# Patient Record
Sex: MALE | Race: WHITE | NOT HISPANIC OR LATINO | Employment: FULL TIME | URBAN - METROPOLITAN AREA
[De-identification: names, ages, dates, MRNs, and addresses within clinical notes are randomized per-mention and may not be internally consistent; named-entity substitution may affect disease eponyms.]

---

## 2017-01-11 ENCOUNTER — OFFICE VISIT (OUTPATIENT)
Dept: INTERNAL MEDICINE | Facility: CLINIC | Age: 22
End: 2017-01-11
Payer: COMMERCIAL

## 2017-01-11 ENCOUNTER — IMMUNIZATION (OUTPATIENT)
Dept: INTERNAL MEDICINE | Facility: CLINIC | Age: 22
End: 2017-01-11
Payer: COMMERCIAL

## 2017-01-11 ENCOUNTER — LAB VISIT (OUTPATIENT)
Dept: LAB | Facility: HOSPITAL | Age: 22
End: 2017-01-11
Payer: COMMERCIAL

## 2017-01-11 VITALS
HEART RATE: 102 BPM | DIASTOLIC BLOOD PRESSURE: 60 MMHG | WEIGHT: 276.69 LBS | HEIGHT: 73 IN | BODY MASS INDEX: 36.67 KG/M2 | SYSTOLIC BLOOD PRESSURE: 142 MMHG

## 2017-01-11 DIAGNOSIS — R63.1 POLYDIPSIA: ICD-10-CM

## 2017-01-11 DIAGNOSIS — Z00.00 HEALTHCARE MAINTENANCE: ICD-10-CM

## 2017-01-11 DIAGNOSIS — R03.0 ELEVATED BLOOD PRESSURE READING IN OFFICE WITHOUT DIAGNOSIS OF HYPERTENSION: ICD-10-CM

## 2017-01-11 DIAGNOSIS — I10 ESSENTIAL HYPERTENSION: ICD-10-CM

## 2017-01-11 DIAGNOSIS — E66.9 OBESITY, CLASS II, BMI 35-39.9: ICD-10-CM

## 2017-01-11 DIAGNOSIS — E11.9 DIABETES MELLITUS, NEW ONSET: Primary | ICD-10-CM

## 2017-01-11 LAB
ALBUMIN SERPL BCP-MCNC: 3.9 G/DL
ALP SERPL-CCNC: 82 U/L
ALT SERPL W/O P-5'-P-CCNC: 96 U/L
ANION GAP SERPL CALC-SCNC: 14 MMOL/L
AST SERPL-CCNC: 57 U/L
BILIRUB SERPL-MCNC: 0.5 MG/DL
BUN SERPL-MCNC: 10 MG/DL
CALCIUM SERPL-MCNC: 9.7 MG/DL
CHLORIDE SERPL-SCNC: 103 MMOL/L
CHOLEST/HDLC SERPL: 8.6 {RATIO}
CO2 SERPL-SCNC: 20 MMOL/L
CREAT SERPL-MCNC: 0.9 MG/DL
EST. GFR  (AFRICAN AMERICAN): >60 ML/MIN/1.73 M^2
EST. GFR  (NON AFRICAN AMERICAN): >60 ML/MIN/1.73 M^2
GLUCOSE SERPL-MCNC: 367 MG/DL
HDL/CHOLESTEROL RATIO: 11.7 %
HDLC SERPL-MCNC: 240 MG/DL
HDLC SERPL-MCNC: 28 MG/DL
LDLC SERPL CALC-MCNC: ABNORMAL MG/DL
NONHDLC SERPL-MCNC: 212 MG/DL
POTASSIUM SERPL-SCNC: 4 MMOL/L
PROT SERPL-MCNC: 7.3 G/DL
SODIUM SERPL-SCNC: 137 MMOL/L
TRIGL SERPL-MCNC: 419 MG/DL
TSH SERPL DL<=0.005 MIU/L-ACNC: 2.24 UIU/ML

## 2017-01-11 PROCEDURE — 82088 ASSAY OF ALDOSTERONE: CPT

## 2017-01-11 PROCEDURE — 36415 COLL VENOUS BLD VENIPUNCTURE: CPT

## 2017-01-11 PROCEDURE — 3046F HEMOGLOBIN A1C LEVEL >9.0%: CPT | Mod: S$GLB,,, | Performed by: STUDENT IN AN ORGANIZED HEALTH CARE EDUCATION/TRAINING PROGRAM

## 2017-01-11 PROCEDURE — 2022F DILAT RTA XM EVC RTNOPTHY: CPT | Mod: S$GLB,,, | Performed by: STUDENT IN AN ORGANIZED HEALTH CARE EDUCATION/TRAINING PROGRAM

## 2017-01-11 PROCEDURE — 90471 IMMUNIZATION ADMIN: CPT | Mod: S$GLB,,, | Performed by: INTERNAL MEDICINE

## 2017-01-11 PROCEDURE — 3060F POS MICROALBUMINURIA REV: CPT | Mod: S$GLB,,, | Performed by: STUDENT IN AN ORGANIZED HEALTH CARE EDUCATION/TRAINING PROGRAM

## 2017-01-11 PROCEDURE — 83036 HEMOGLOBIN GLYCOSYLATED A1C: CPT

## 2017-01-11 PROCEDURE — 84443 ASSAY THYROID STIM HORMONE: CPT

## 2017-01-11 PROCEDURE — 99999 PR PBB SHADOW E&M-EST. PATIENT-LVL III: CPT | Mod: PBBFAC,,, | Performed by: STUDENT IN AN ORGANIZED HEALTH CARE EDUCATION/TRAINING PROGRAM

## 2017-01-11 PROCEDURE — 1159F MED LIST DOCD IN RCRD: CPT | Mod: S$GLB,,, | Performed by: STUDENT IN AN ORGANIZED HEALTH CARE EDUCATION/TRAINING PROGRAM

## 2017-01-11 PROCEDURE — 80061 LIPID PANEL: CPT

## 2017-01-11 PROCEDURE — 80053 COMPREHEN METABOLIC PANEL: CPT

## 2017-01-11 PROCEDURE — 3078F DIAST BP <80 MM HG: CPT | Mod: S$GLB,,, | Performed by: STUDENT IN AN ORGANIZED HEALTH CARE EDUCATION/TRAINING PROGRAM

## 2017-01-11 PROCEDURE — 99204 OFFICE O/P NEW MOD 45 MIN: CPT | Mod: S$GLB,,, | Performed by: STUDENT IN AN ORGANIZED HEALTH CARE EDUCATION/TRAINING PROGRAM

## 2017-01-11 PROCEDURE — 90688 IIV4 VACCINE SPLT 0.5 ML IM: CPT | Mod: S$GLB,,, | Performed by: INTERNAL MEDICINE

## 2017-01-11 PROCEDURE — 3074F SYST BP LT 130 MM HG: CPT | Mod: S$GLB,,, | Performed by: STUDENT IN AN ORGANIZED HEALTH CARE EDUCATION/TRAINING PROGRAM

## 2017-01-11 RX ORDER — CLOSTRIDIUM TETANI TOXOID ANTIGEN (FORMALDEHYDE INACTIVATED), CORYNEBACTERIUM DIPHTHERIAE TOXOID ANTIGEN (FORMALDEHYDE INACTIVATED), BORDETELLA PERTUSSIS TOXOID ANTIGEN (GLUTARALDEHYDE INACTIVATED), BORDETELLA PERTUSSIS FILAMENTOUS HEMAGGLUTININ ANTIGEN (FORMALDEHYDE INACTIVATED), BORDETELLA PERTUSSIS PERTACTIN ANTIGEN, AND BORDETELLA PERTUSSIS FIMBRIAE 2/3 ANTIGEN 5; 2; 2.5; 5; 3; 5 [LF]/.5ML; [LF]/.5ML; UG/.5ML; UG/.5ML; UG/.5ML; UG/.5ML
INJECTION, SUSPENSION INTRAMUSCULAR
Refills: 0 | COMMUNITY
Start: 2016-11-19 | End: 2017-09-01

## 2017-01-11 NOTE — PROGRESS NOTES
Subjective:       Patient ID: Roberto Carlos Souza is a 21 y.o. male.    Chief Complaint: Annual Exam    HPI Comments: Roberto Carlos Souza is a 21 year old male with a medical history significant for generalized anxiety disorder, who presents to the clinic this afternoon to establish care and to manage hypertension that has been found at his psychiatrist's office. According to chart review, patient's last BP measured there on 10/11/2016 was 172/106. Patient otherwise denies symptoms; he states that he does not have headaches, vision changes, chest pain, or shortness of breath. He does endorse being very thirsty the last 2-3 weeks and nocturia (voiding at least twice per night last 2 weeks). 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 He has been told he snores loudly in his sleep but he denies waking up short of breath at night or headaches in the mornings or through the night.     Review of Systems   Constitutional: Positive for fatigue. Negative for activity change, chills and diaphoresis.   HENT: Negative for congestion and sore throat.    Eyes: Negative for pain and visual disturbance.   Respiratory: Negative for chest tightness, shortness of breath and wheezing.    Cardiovascular: Negative for chest pain, palpitations and leg swelling.   Gastrointestinal: Negative for abdominal distention, constipation, diarrhea, nausea and vomiting.   Endocrine: Positive for polydipsia and polyuria. Negative for cold intolerance and heat intolerance.   Genitourinary: Positive for frequency. Negative for dysuria, hematuria and urgency.   Musculoskeletal: Positive for back pain. Negative for arthralgias.   Neurological: Negative for dizziness, light-headedness and numbness.   Psychiatric/Behavioral: Positive for sleep disturbance. Negative for confusion and suicidal ideas. The patient is not nervous/anxious.        Objective:       Visit Vitals    BP (!) 142/60 (BP Location:  "Left arm, Patient Position: Sitting, BP Method: Manual)    Pulse 102    Ht 6' 1" (1.854 m)    Wt 125.5 kg (276 lb 10.8 oz)    BMI 36.5 kg/m2     Physical Exam   Constitutional: He is oriented to person, place, and time. He appears well-developed and well-nourished. No distress.   HENT:   Head: Normocephalic and atraumatic.   Mouth/Throat: Oropharynx is clear and moist. No oropharyngeal exudate.   Eyes: EOM are normal. Pupils are equal, round, and reactive to light.   Neck: Normal range of motion. Neck supple. No JVD present.   Cardiovascular: Normal rate, regular rhythm, normal heart sounds and intact distal pulses.    No murmur heard.  Pulmonary/Chest: Effort normal and breath sounds normal. He has no wheezes.   Abdominal: Soft. Bowel sounds are normal. He exhibits no distension. There is no tenderness.   Lymphadenopathy:     He has no cervical adenopathy.   Neurological: He is alert and oriented to person, place, and time.   Skin: Skin is warm and dry. No rash noted. He is not diaphoretic. No erythema.       Assessment:       1. Healthcare maintenance    2. Polydipsia    3. Essential hypertension        Plan:       1. Hypertension  - Last check at psychiatrist's office was 172/106. First check with electric cuff here was 164/98.  - However, with manual cuff with appropriate size (patient with large arms), RA was 138/68 and LA was 142/60 - still elevated but much improved.  - Likely 2/2 to incorrect cuff size and use of machine rather than manual readings.  - Ordered TSH and aldosterone to investigated secondary causes; if unresolved will consider sleep study for GIO investigation (suggestive habitus, with BMI of 36.5) and PATRICK US if other results inconclusive.     2. Polydipsia  - Significant family history of both parents having type II DM; recent symptoms of polydipsia and nocturia.  - With daytime fatigue which could be work-related, 2/2 to undiagnosed GIO, or undiagnosed type II DM.  - HgA1c ordered " today.    3. Health maintenance  - LIPID PANEL and Comprehensive metabolic panel ordered today.  - Received Tdap on 11/29 and received influenza vaccination this afternoon.  - Not interested in STI testing.    Disposition: F/U 1 year; will call back patient with results of labs.    Pavel Bush MD  PGY-1 Internal Medicine  [phone]

## 2017-01-11 NOTE — MR AVS SNAPSHOT
[facility]  [address]  [place] [address]  Phone: [phone]  Fax: [phone]                  Roberto Carlos Souza   2017 2:15 PM   Office Visit    Description:  Male : 1995   Provider:  Pavel Bush MD   Department:  [facility]           Reason for Visit     Annual Exam           Diagnoses this Visit        Comments    Healthcare maintenance    -  Primary     Polydipsia         Essential hypertension                To Do List           Goals (5 Years of Data)     None      [facility]     [facility] Care [facility] -  Assistance  Registered nurses in the [facility] Call Center provide clinical advisement, health education, appointment booking, and other advisory services.  Call for this free service at [phone].             Medications           Message regarding Medications     Verify the changes and/or additions to your medication regime listed below are the same as discussed with your clinician today.  If any of these changes or additions are incorrect, please notify your healthcare provider.             Verify that the below list of medications is an accurate representation of the medications you are currently taking.  If none reported, the list may be blank. If incorrect, please contact your healthcare provider. Carry this list with you in case of emergency.           Current Medications     ADACEL,TDAP ADOLESN/ADULT,,PF, 2 Lf-(2.5-5-3-5 mcg)-5Lf/0.5 mL Syrg TO BE ADMINISTERED BY PHARMACIST FOR IMMUNIZATION    buPROPion (WELLBUTRIN XL) 150 MG TB24 tablet Take 1 tablet (150 mg total) by mouth once daily.    FEXOFENADINE HCL (ALLEGRA ORAL) Take by mouth.    fluoxetine (PROZAC) 10 MG capsule Take 1 capsule (10 mg total) by mouth once daily.           Clinical Reference Information           Vital Signs - Last Recorded  Most recent update: 2017  3:25 PM by Pavel Bush MD    BP Pulse Ht Wt BMI    (!) 142/60 (BP  "Location: Left arm, Patient Position: Sitting, BP Method: Manual) 102 6' 1" (1.854 m) 125.5 kg (276 lb 10.8 oz) 36.5 kg/m2      Blood Pressure          Most Recent Value    BP  (!)  142/60      Allergies as of 1/11/2017     No Known Allergies      Immunizations Administered on Date of Encounter - 1/11/2017     None      Orders Placed During Today's Visit     Future Labs/Procedures Expected by Expires    Aldosterone  1/11/2017 3/12/2018    Comprehensive metabolic panel  1/11/2017 3/12/2018    HEMOGLOBIN A1C  1/11/2017 3/12/2018    LIPID PANEL  1/11/2017 3/12/2018    TSH  1/11/2017 3/12/2018      Smoking Cessation     If you would like to quit smoking:   You may be eligible for free services if you are a [place] resident and started smoking cigarettes before September 1, 1988.  Call the Smoking Cessation [employer] (SCT) toll free at (560) 850-5407 or (485) 773-9926.   Call 9-800-QUIT-NOW if you do not meet the above criteria.            "

## 2017-01-12 LAB
ESTIMATED AVG GLUCOSE: 237 MG/DL
HBA1C MFR BLD HPLC: 9.9 %

## 2017-01-13 DIAGNOSIS — Z00.00 HEALTHCARE MAINTENANCE: Primary | ICD-10-CM

## 2017-01-13 DIAGNOSIS — E11.9 TYPE 2 DIABETES MELLITUS WITHOUT COMPLICATION, WITHOUT LONG-TERM CURRENT USE OF INSULIN: Primary | ICD-10-CM

## 2017-01-13 RX ORDER — METFORMIN HYDROCHLORIDE 500 MG/1
TABLET ORAL
Qty: 180 TABLET | Refills: 3 | Status: SHIPPED | OUTPATIENT
Start: 2017-01-13 | End: 2017-05-23

## 2017-01-13 NOTE — TELEPHONE ENCOUNTER
I was able to reach Mr. Souza on the phone to discuss the findings of the labs, particularly a HgA1c of 9.9. As he is a very young man, I have deferred starting insulin at this moment (had discussion with staff physician Dr. Chavez). I had a long discussion with him about diet modification and starting metformin, prior to using insulin. I have sent metformin to his pharmacy with instruction to take 500 mg PO BID then increase to 1000 mg PO BID after one week. We discussed the adverse effects, largely GI upset and diarrhea, that could happen with the medication. Otherwise, I have also reordered another lipid panel, fasting, as the first was done without fasting.

## 2017-01-13 NOTE — PROGRESS NOTES
Preceptor note    Patient's history and physical discussed, please refer to resident physician's note for specific details. Pt seen and examined with resident physician. Medical record reviewed.  I agree with resident's assessment and plan.    In addition, labs confirm new onset diabetes with A1C 9.9 and hypertriglyceridemia. Given his age and BMI 36 need to consider whether this is type 1 vs type 2. Patient needs short-term follow up to discuss management. In there interim, start metformin 500 mg daily for 7-10 days. If no GI side effect, increase to 500 mg BID. Goal is to titrate up to 1000 mg BID if tolerated. At follow up visit if metformin tolerated, discuss options for second medication. Refer to diabetes education. Patient advised to check home BP to assist with assessing whether he has HTN. He may benefit from low dose ACE inhibitor.

## 2017-01-16 LAB — ALDOST SERPL-MCNC: 13.5 NG/DL

## 2017-01-17 ENCOUNTER — TELEPHONE (OUTPATIENT)
Dept: DIABETES | Facility: CLINIC | Age: 22
End: 2017-01-17

## 2017-02-17 ENCOUNTER — CLINICAL SUPPORT (OUTPATIENT)
Dept: DIABETES | Facility: CLINIC | Age: 22
End: 2017-02-17
Payer: COMMERCIAL

## 2017-02-17 DIAGNOSIS — E11.9 TYPE 2 DIABETES MELLITUS WITHOUT COMPLICATION, WITHOUT LONG-TERM CURRENT USE OF INSULIN: ICD-10-CM

## 2017-02-17 DIAGNOSIS — E11.9 DIABETES MELLITUS: Primary | ICD-10-CM

## 2017-02-17 PROCEDURE — G0108 DIAB MANAGE TRN  PER INDIV: HCPCS | Mod: S$GLB,,, | Performed by: INTERNAL MEDICINE

## 2017-02-17 PROCEDURE — 99999 PR PBB SHADOW E&M-EST. PATIENT-LVL I: CPT | Mod: PBBFAC,,,

## 2017-02-17 NOTE — PROGRESS NOTES
"   02/17/17 0000   Diabetes Education Visit   Diabetes Education Record Assessment/Progress Initial   Diabetes Type   Diabetes Type  Type II   Diabetes History   Diabetes Diagnosis 0-1 year   Nutrition   Meal Planning skipping meals;water;diet drinks;eats out often  (Skips bfast; does not usually snack)   Monitoring    Monitoring (Needs meter; using his parents' meter)   Blood Glucose Logs No   Exercise    Exercise Type (Walking the dog)   Current Diabetes Treatment    Current Treatment Oral Medication  (Metformin 1000mg BID)   Social History   Preferred Learning Method Face to Face;Demonstration;Hands On;Reading Materials   Primary Support Self   Smoking Status Ex Smoker   Alcohol Use Never  ("Not any more")   Barriers to Change   Barriers to Change None   Learning Challenges  None   Readiness to Learn    Readiness to Learn  Acceptance   Cultural Influences   Cultural Influences No   Diabetes Education Assessment/Progress   Acute Complications (preventing, detecting, and treating acute complications) 5;DC;IS;W   Chronic Complications (preventing, detecting, and treating chronic complications) 5;DC;IS;W   Diabetes Disease Process (diabetes disease process and treatment options) 5;DC;IS;W   Nutrition (Incorporating nutritional management into one's lifestyle) 5;DC;IS;W   Physical Activity (incorporating physical activity into one's lifestyle) 5;DC;IS;W   Medications (states correct name, dose, onset, peak, duration, side effects & timing of meds) 5;DC;IS;W   Monitoring (monitoring blood glucose/other parameters & using results) 5;DC;IS;W   Goal Setting and Problem Solving (verbalizes behavior change strategies & sets realistic goals) 5;DC;IS   Behavior Change (developing personal strategies to health & behavior change) 5;DC;IS   Psychosocial Issues (developing personal srategies to address psychosocial concerns) 5;DC;IS   Goals   Monitoring Set  (Check BG BID)   Start Date 02/17/17   Diabetes Care Plan/Intervention "   Education Plan/Intervention In F/U DSMT   Diabetes Meal Plan   Carbohydrate Per Meal 45-60g   Carbohydrate Per Snack  15-20g   Education Units of Time    Time Spent 45 min

## 2017-03-22 RX ORDER — BUPROPION HYDROCHLORIDE 150 MG/1
150 TABLET ORAL DAILY
Qty: 30 TABLET | Refills: 1 | Status: SHIPPED | OUTPATIENT
Start: 2017-03-22 | End: 2017-04-25 | Stop reason: SDUPTHER

## 2017-03-22 RX ORDER — FLUOXETINE 10 MG/1
10 CAPSULE ORAL DAILY
Qty: 30 CAPSULE | Refills: 1 | Status: SHIPPED | OUTPATIENT
Start: 2017-03-22 | End: 2017-04-25 | Stop reason: SDUPTHER

## 2017-04-25 ENCOUNTER — OFFICE VISIT (OUTPATIENT)
Dept: PSYCHIATRY | Facility: CLINIC | Age: 22
End: 2017-04-25
Payer: COMMERCIAL

## 2017-04-25 VITALS
DIASTOLIC BLOOD PRESSURE: 94 MMHG | HEIGHT: 73 IN | WEIGHT: 276 LBS | BODY MASS INDEX: 36.58 KG/M2 | HEART RATE: 63 BPM | SYSTOLIC BLOOD PRESSURE: 148 MMHG

## 2017-04-25 DIAGNOSIS — F41.1 GAD (GENERALIZED ANXIETY DISORDER): ICD-10-CM

## 2017-04-25 DIAGNOSIS — F33.42 MDD (MAJOR DEPRESSIVE DISORDER), RECURRENT, IN FULL REMISSION: Primary | ICD-10-CM

## 2017-04-25 PROCEDURE — 3077F SYST BP >= 140 MM HG: CPT | Mod: S$GLB,,, | Performed by: PSYCHIATRY & NEUROLOGY

## 2017-04-25 PROCEDURE — 3080F DIAST BP >= 90 MM HG: CPT | Mod: S$GLB,,, | Performed by: PSYCHIATRY & NEUROLOGY

## 2017-04-25 PROCEDURE — 1160F RVW MEDS BY RX/DR IN RCRD: CPT | Mod: S$GLB,,, | Performed by: PSYCHIATRY & NEUROLOGY

## 2017-04-25 PROCEDURE — 99213 OFFICE O/P EST LOW 20 MIN: CPT | Mod: S$GLB,,, | Performed by: PSYCHIATRY & NEUROLOGY

## 2017-04-25 PROCEDURE — 99999 PR PBB SHADOW E&M-EST. PATIENT-LVL III: CPT | Mod: PBBFAC,,, | Performed by: PSYCHIATRY & NEUROLOGY

## 2017-04-25 RX ORDER — BUPROPION HYDROCHLORIDE 150 MG/1
150 TABLET ORAL DAILY
Qty: 30 TABLET | Refills: 3 | Status: SHIPPED | OUTPATIENT
Start: 2017-04-25 | End: 2017-09-13 | Stop reason: SDUPTHER

## 2017-04-25 RX ORDER — FLUOXETINE 10 MG/1
10 CAPSULE ORAL DAILY
Qty: 30 CAPSULE | Refills: 3 | Status: SHIPPED | OUTPATIENT
Start: 2017-04-25 | End: 2017-09-13 | Stop reason: SDUPTHER

## 2017-04-25 NOTE — PROGRESS NOTES
04/25/2017  9:00 AM  Roberto Carlos Souza  [id number]          Psychiatry MD Clinic Note    SUBJECTIVE  Roberto Carlos Souza is a 21 y.o. old male who presents to clinic today for follow up of depression/anxiety.    Patient was calm/cooperative/pleasant throughout the interview. He continues to deny any current or recent signs/symptoms of depression, anxiety, panic, benny, or psychosis. He continues to deny any SI/HI/AH/VH/delusions. He continues to endorse that his mood and anxiety are well controlled on his current regimen of Wellbutrin  mg PO daily and Prozac 10 mg PO daily (patient is aware that the Wellbutrin XL can increase Prozac levels through CYP interactions). At our last visit, I placed an internal medicine referral 2/2 HTN. The patient has since established care with an [facility] PCP. He was recently diagnosed with DM and HLD, and they are continuing to work him up for HTN. He is now on a regimen of Metformin 1000 mg PO BID. He endorses having a f/u appointment within the next month in which they will reassess his HA1C as well as look at anti-HTN and anti-HLD pharmacotherapy. He denies any acute medical complaints or medication side effects. He continues to endorse eating and sleeping without difficulty. He endorses beginning to follow a diabetic diet. He endorses trying to increase his level of exercise. After again reviewing the risks/benefits of his current psychiatric medication regimen, the patient endorsed a desire to continue Wellbutrin  mg PO daily and Prozac 10 mg PO daily.    Psych ROS:  Issues/problems with:   Sleep no  Appetite changes no  Low energy no  Poor concentration no  Suicidal ideation no  Depressed mood no  Anhedonia no  Anxiety no    Risk Parameters:   Patient reports no suicidal ideation   Patient reports no homicidal ideation   Patient reports no self-injurious behavior   Patient reports no violent behavior     Psychotropic Medications:   Fluoxetine 10 mg PO daily  Wellbutrin   "mg PO daily    Patient reports that he is tolerating medications as prescribed without any adverse side effects.     Other Home Medications:  Current Outpatient Prescriptions on File Prior to Visit   Medication Sig Dispense Refill    FEXOFENADINE HCL (ALLEGRA ORAL) Take by mouth.                     No current facility-administered medications on file prior to visit.        Medical ROS:  General ROS: negative  ENT ROS: negative  Cardiovascular ROS: no chest pain or dyspnea on exertion  Respiratory ROS: no cough, shortness of breath, or wheezing  Gastrointestinal ROS: no abdominal pain, change in bowel habits, or black or bloody stools  Neurological ROS: no TIA or stroke symptoms      OBJECTIVE    Vitals:    04/25/17 0848   BP: (!) 148/94   Pulse: 63     BP Readings from Last 3 Encounters:   04/25/17 (!) 148/94   01/11/17 (!) 142/60   10/11/16 (!) 172/106         Wt Readings from Last 3 Encounters:   04/25/17 125.2 kg (276 lb)   01/11/17 125.5 kg (276 lb 10.8 oz)   10/11/16 127.5 kg (281 lb)       MENTAL STATUS EXAM  Appearance: stated age, casual dress, good hygiene   Behavior: calm, cooperative, pleasant   Speech: normal rate, rhythm, volume  Thought process: linear, logical  Thought content: no SI/HI, no AH/VH  Mood: "pretty good"  Affect: full, reactive, and mood-congruent   Cognition: intact  Insight: intact  Judgment: intact      ASSESSMENT AND PLAN  Roberto Carlos Souza is a 21 y.o. old male with:    Impression    ICD-10-CM ICD-9-CM   1. MDD (major depressive disorder), recurrent, in full remission F33.42 296.36   2. MARCOS (generalized anxiety disorder) F41.1 300.02     Plan  - Continue Prozac 10 mg PO daily for mood/anxiety  - Continue Wellbutrin  mg PO daily for mood  - Counseled on improving diet and exercise  - Instructed patient to continue to follow up with PCP regarding HTN, HLD, DM, etc.  - Take all medications as prescribed.  Abstain from recreational drugs, alcohol, and tobacco.  - Present to ED for " suicidality, homicidality, psychosis, or medical emergency.  - Discussed resident transition with the patient    Discussed diagnosis, risks and benefits of proposed treatment above vs alternative treatments vs no treatment, and potential side effects of these treatments. The patient expresses understanding of the above and displays the capacity to agree with this treatment given said understanding. Patient also agrees that, currently, the benefits outweigh the risks and would like to pursue treatment at this time.     Return to clinic in 3 months.    Aiden Santana MD  [facility] HO[facility]  04/25/2017

## 2017-05-16 ENCOUNTER — LAB VISIT (OUTPATIENT)
Dept: LAB | Facility: HOSPITAL | Age: 22
End: 2017-05-16
Attending: INTERNAL MEDICINE
Payer: COMMERCIAL

## 2017-05-16 PROCEDURE — 36415 COLL VENOUS BLD VENIPUNCTURE: CPT

## 2017-05-16 PROCEDURE — 83036 HEMOGLOBIN GLYCOSYLATED A1C: CPT

## 2017-05-17 ENCOUNTER — CLINICAL SUPPORT (OUTPATIENT)
Dept: DIABETES | Facility: CLINIC | Age: 22
End: 2017-05-17
Payer: COMMERCIAL

## 2017-05-17 DIAGNOSIS — E11.9 DIABETES MELLITUS, NEW ONSET: ICD-10-CM

## 2017-05-17 DIAGNOSIS — E11.9 DIABETES MELLITUS, NEW ONSET: Primary | ICD-10-CM

## 2017-05-17 LAB
ESTIMATED AVG GLUCOSE: 146 MG/DL
HBA1C MFR BLD HPLC: 6.7 %

## 2017-05-17 PROCEDURE — G0108 DIAB MANAGE TRN  PER INDIV: HCPCS | Mod: S$GLB,,, | Performed by: INTERNAL MEDICINE

## 2017-05-17 PROCEDURE — 99999 PR PBB SHADOW E&M-EST. PATIENT-LVL I: CPT | Mod: PBBFAC,,,

## 2017-05-17 RX ORDER — LANCETS
1 EACH MISCELLANEOUS DAILY
Qty: 30 EACH | Refills: 11 | Status: SHIPPED | OUTPATIENT
Start: 2017-05-17 | End: 2017-09-01 | Stop reason: SDUPTHER

## 2017-05-17 NOTE — LETTER
May 17, 2017      Pavel Bush MD  [address] [facility] [address]         Patient: Roberto Carlos Souza   MR Number: [id number]   YOB: 1995   Date of Visit: 5/17/2017       Dear Dr. Bush:    Thank you for referring Roberto Carlos for diabetes self-management education and support. He has completed all components of our Diabetes Management Program and his Self-Management Support Plan. Below is a summary of his clinical outcomes and goal progress.    Patient Outcomes:    A1c Status:   Lab Results   Component Value Date    HGBA1C 6.7 (H) 05/16/2017    HGBA1C 9.9 (H) 01/11/2017     Goals  Monitoring: In Progress (Patient was not able to check 2 times a day as initially recommended because testing supplies were not sent to pharmacy - was only able to check once a day with parents' supplies; okay to check once a day now)    Diabetes Self-Management Support Plan  Diabetes Learning: other (Annual DE)    Follow up:   · Roberto Carlos to follow diabetes support plan indicated above  · Roberto Carlos to attend medical appointments as scheduled  · Roberto Carlos to update you on his DM education progress as needed      If you have questions, please do not hesitate to call me. I look forward to providing additional education and support as needed.    Sincerely,    Cassandra Plummer RD, CDE

## 2017-05-23 ENCOUNTER — OFFICE VISIT (OUTPATIENT)
Dept: ENDOCRINOLOGY | Facility: CLINIC | Age: 22
End: 2017-05-23
Payer: COMMERCIAL

## 2017-05-23 VITALS
HEIGHT: 73 IN | WEIGHT: 274.06 LBS | SYSTOLIC BLOOD PRESSURE: 138 MMHG | DIASTOLIC BLOOD PRESSURE: 90 MMHG | HEART RATE: 80 BPM | BODY MASS INDEX: 36.32 KG/M2

## 2017-05-23 DIAGNOSIS — R03.0 BLOOD PRESSURE ELEVATED WITHOUT HISTORY OF HTN: Primary | ICD-10-CM

## 2017-05-23 DIAGNOSIS — E11.9 CONTROLLED TYPE 2 DIABETES MELLITUS WITHOUT COMPLICATION, WITHOUT LONG-TERM CURRENT USE OF INSULIN: ICD-10-CM

## 2017-05-23 PROCEDURE — 3075F SYST BP GE 130 - 139MM HG: CPT | Mod: S$GLB,,, | Performed by: INTERNAL MEDICINE

## 2017-05-23 PROCEDURE — 99204 OFFICE O/P NEW MOD 45 MIN: CPT | Mod: S$GLB,,, | Performed by: INTERNAL MEDICINE

## 2017-05-23 PROCEDURE — 3044F HG A1C LEVEL LT 7.0%: CPT | Mod: S$GLB,,, | Performed by: INTERNAL MEDICINE

## 2017-05-23 PROCEDURE — 1160F RVW MEDS BY RX/DR IN RCRD: CPT | Mod: S$GLB,,, | Performed by: INTERNAL MEDICINE

## 2017-05-23 PROCEDURE — 3060F POS MICROALBUMINURIA REV: CPT | Mod: 8P,S$GLB,, | Performed by: INTERNAL MEDICINE

## 2017-05-23 PROCEDURE — 3080F DIAST BP >= 90 MM HG: CPT | Mod: S$GLB,,, | Performed by: INTERNAL MEDICINE

## 2017-05-23 PROCEDURE — 99999 PR PBB SHADOW E&M-EST. PATIENT-LVL III: CPT | Mod: PBBFAC,,, | Performed by: INTERNAL MEDICINE

## 2017-05-23 RX ORDER — METFORMIN HYDROCHLORIDE 500 MG/1
1000 TABLET, EXTENDED RELEASE ORAL 2 TIMES DAILY WITH MEALS
Qty: 120 TABLET | Refills: 11 | Status: SHIPPED | OUTPATIENT
Start: 2017-05-23 | End: 2018-05-15 | Stop reason: SDUPTHER

## 2017-05-23 NOTE — PATIENT INSTRUCTIONS
Weigh yourself weekly,   Continue metformin twice daily   Avoid high salt foods  Lean proteins, vegetables and salad  High fiber carbohydrate, 3 - 5 gms of fiber per serving

## 2017-05-23 NOTE — LETTER
May 23, 2017      Pavel Bush MD  [address]  [facility] [address]           [facility]  [address]  [facility] [address]  Phone: [phone]  Fax: [phone]          Patient: Roberto Carlos Souza   MR Number: [id number]   YOB: 1995   Date of Visit: 5/23/2017       Dear Dr. Pavel Bush:    Thank you for referring Roberto Carlos Souza to me for evaluation. Attached you will find relevant portions of my assessment and plan of care.    If you have questions, please do not hesitate to call me. I look forward to following Roberto Carlos Souza along with you.    Sincerely,    Zina Alvarez MD    Enclosure  CC:  No Recipients    If you would like to receive this communication electronically, please contact [email or web address] or [phone] to request more information on [employer] access.    For providers and/or their staff who would like to refer a patient to [facility], please contact us through our one-stop-shop provider referral line, [facility], at [phone].    If you feel you have received this communication in error or would no longer like to receive these types of communications, please e-mail [email or web address]

## 2017-05-23 NOTE — PROGRESS NOTES
Subjective:     Patient ID: Roberto Carlos Souza is a 21 y.o. male.    Chief Complaint: No chief complaint on file.    HPI:   Mr. Souza is a 21 y.o. male who is here for a consult visit for evaluation for the first time for type 2 diabetes management, this was diagnosed five months ago.  At the time of diagnosis, patient reports dry mouth and fatigue in the afternoon. Both parents have type 2 diabetes and used meter to check BGs (>300).   Denies nocturia, polyuria, unexplained weight loss or blurred vision. Has lost about 40 lbs but is avoiding sugars/sweets and increasing exercise.     Diabetes medications include:  Metformin 500 mg two tablets twice a day. Reports lose stools daily, denies cramps.   Lowest BG in the 80s.     Diabetes complications:  Last eye evaluation over one year. Had lasik two years ago.   Denies numbness, tingling sensation, symptomatic CAD or CVD or kidney disease.    Last urine test none.     24-h dietary recall:  Biggest change was switching to water and unsweetened tea.   Breakfast -  Black coffee and muffin  Lunch - sandwich from subway  Dinner - Teak - [employer]  Snacks - does not snack.     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     Diabetes education: yes    SMBG: Tests BG once a day    Exercise: walks his dog thirty minutes twice a day    ADA STANDARDS of CARE:        ACE inhibitor of angiotensin II receptor blocker:          Statin drug:          Low dose ASA:         Eye exam within last year:         Dental exam:         Flu shot:        Pneumonia vaccine:        Microalbumin:     Past Medical History:      Review of Systems   Constitutional: Negative for chills and fever.   HENT: Negative for congestion and sinus pressure.    Eyes: Negative for visual disturbance.   Respiratory: Negative for chest tightness and shortness of breath.    Cardiovascular: Negative for chest pain and palpitations.   Gastrointestinal: Negative for abdominal distention, diarrhea, nausea and vomiting.  "  Genitourinary: Negative for dysuria and flank pain.   Musculoskeletal: Negative for back pain.   Skin: Negative for rash.   Neurological: Negative for weakness.   Hematological: Does not bruise/bleed easily.   Psychiatric/Behavioral: Negative for sleep disturbance.        Objective:     Physical Exam   Constitutional: He is oriented to person, place, and time. He appears well-developed and well-nourished. No distress.   HENT:   Head: Normocephalic and atraumatic.   Nose: Nose normal.   Mouth/Throat: Oropharynx is clear and moist. No oropharyngeal exudate.   Eyes: Conjunctivae and EOM are normal. Pupils are equal, round, and reactive to light. No scleral icterus.   Neck: Normal range of motion. Neck supple. No tracheal deviation present. No thyromegaly present.   Cardiovascular: Normal rate, regular rhythm, normal heart sounds and intact distal pulses.    Pulmonary/Chest: Effort normal and breath sounds normal.   Abdominal: Soft. Bowel sounds are normal. He exhibits no distension. There is no tenderness.   Musculoskeletal: Normal range of motion. He exhibits no edema or tenderness.   Lymphadenopathy:     He has no cervical adenopathy.   Neurological: He is alert and oriented to person, place, and time. He has normal reflexes.   Skin: Skin is warm and dry.   FOOT EXAM:  Visual inspection reveals no abrasions, bruises or calluses.  Vibratory sense is intact b/l.  Microfilament test is intact b/l.  Distal pulses present b/l.   Psychiatric: He has a normal mood and affect.     Vitals:    05/23/17 1047   BP: (!) 138/90   Pulse: 80   Weight: 124.3 kg (274 lb 0.5 oz)   Height: 6' 1" (1.854 m)     Body mass index is 36.15 kg/m².      Results for DERRICK GRIMES (MRN [id number]) as of 5/23/2017 10:15   Ref. Range 1/11/2017 15:44 5/16/2017 08:38   Sodium Latest Ref Range: 136 - 145 mmol/L 137    Potassium Latest Ref Range: 3.5 - 5.1 mmol/L 4.0    Chloride Latest Ref Range: 95 - 110 mmol/L 103    CO2 Latest Ref Range: 23 - 29 " mmol/L 20 (L)    Anion Gap Latest Ref Range: 8 - 16 mmol/L 14    BUN, Bld Latest Ref Range: 6 - 20 mg/dL 10    Creatinine Latest Ref Range: 0.5 - 1.4 mg/dL 0.9    eGFR if non African American Latest Ref Range: >60 mL/min/1.73 m^2 >60.0    eGFR if African American Latest Ref Range: >60 mL/min/1.73 m^2 >60.0    Glucose Latest Ref Range: 70 - 110 mg/dL 367 (H)    Calcium Latest Ref Range: 8.7 - 10.5 mg/dL 9.7    Alkaline Phosphatase Latest Ref Range: 55 - 135 U/L 82    Total Protein Latest Ref Range: 6.0 - 8.4 g/dL 7.3    Albumin Latest Ref Range: 3.5 - 5.2 g/dL 3.9    Total Bilirubin Latest Ref Range: 0.1 - 1.0 mg/dL 0.5    AST Latest Ref Range: 10 - 40 U/L 57 (H)    ALT Latest Ref Range: 10 - 44 U/L 96 (H)    Triglycerides Latest Ref Range: 30 - 150 mg/dL 419 (H)    Cholesterol Latest Ref Range: 120 - 199 mg/dL 240 (H)    HDL Latest Ref Range: 40 - 75 mg/dL 28 (L)    LDL Cholesterol Latest Ref Range: 63.0 - 159.0 mg/dL Invalid, Trig>400.0    Total Cholesterol/HDL Ratio Latest Ref Range: 2.0 - 5.0  8.6 (H)    Aldosterone Latest Units: ng/dL 13.5    Hemoglobin A1C Latest Ref Range: 4.5 - 6.2 % 9.9 (H) 6.7 (H)   Estimated Avg Glucose Latest Ref Range: 68 - 131 mg/dL 237 (H) 146 (H)   TSH Latest Ref Range: 0.400 - 4.000 uIU/mL 2.239      Assessment/Plan:     1. Controlled type 2 diabetes mellitus without complication, without long-term current use of insulin  - change to extended release formulation of metformin to avoid GI side effects  - continue weight loss efforts  - Hemoglobin A1c; Future    2. BMI 36.0-36.9,adult  - goals discussed, 5% total body weight.     3. Blood pressure elevated without history of HTN  - avoid salty foods  - monitor Blood pressure, < 140/90      F/u in A1C in four months  OK to f/u with PCP every six months once A1C is at goal. (6.5%)

## 2017-05-25 RX ORDER — LANCETS
1 EACH MISCELLANEOUS 3 TIMES DAILY
Qty: 100 EACH | Refills: 11 | Status: SHIPPED | OUTPATIENT
Start: 2017-05-25 | End: 2024-01-23

## 2017-09-01 ENCOUNTER — OFFICE VISIT (OUTPATIENT)
Dept: ENDOCRINOLOGY | Facility: CLINIC | Age: 22
End: 2017-09-01
Payer: COMMERCIAL

## 2017-09-01 VITALS
HEIGHT: 73 IN | DIASTOLIC BLOOD PRESSURE: 79 MMHG | BODY MASS INDEX: 36.02 KG/M2 | WEIGHT: 271.81 LBS | HEART RATE: 80 BPM | SYSTOLIC BLOOD PRESSURE: 134 MMHG

## 2017-09-01 DIAGNOSIS — E11.9 CONTROLLED TYPE 2 DIABETES MELLITUS WITHOUT COMPLICATION, WITHOUT LONG-TERM CURRENT USE OF INSULIN: Primary | ICD-10-CM

## 2017-09-01 DIAGNOSIS — E66.09 NON MORBID OBESITY DUE TO EXCESS CALORIES: ICD-10-CM

## 2017-09-01 LAB
CREAT UR-MCNC: 233 MG/DL
MICROALBUMIN UR DL<=1MG/L-MCNC: 200 UG/ML
MICROALBUMIN/CREATININE RATIO: 85.8 UG/MG

## 2017-09-01 PROCEDURE — 82570 ASSAY OF URINE CREATININE: CPT

## 2017-09-01 PROCEDURE — 3078F DIAST BP <80 MM HG: CPT | Mod: S$GLB,,, | Performed by: NURSE PRACTITIONER

## 2017-09-01 PROCEDURE — 3008F BODY MASS INDEX DOCD: CPT | Mod: S$GLB,,, | Performed by: NURSE PRACTITIONER

## 2017-09-01 PROCEDURE — 3044F HG A1C LEVEL LT 7.0%: CPT | Mod: S$GLB,,, | Performed by: NURSE PRACTITIONER

## 2017-09-01 PROCEDURE — 99999 PR PBB SHADOW E&M-EST. PATIENT-LVL III: CPT | Mod: PBBFAC,,, | Performed by: NURSE PRACTITIONER

## 2017-09-01 PROCEDURE — 3075F SYST BP GE 130 - 139MM HG: CPT | Mod: S$GLB,,, | Performed by: NURSE PRACTITIONER

## 2017-09-01 PROCEDURE — 99213 OFFICE O/P EST LOW 20 MIN: CPT | Mod: S$GLB,,, | Performed by: NURSE PRACTITIONER

## 2017-09-01 NOTE — PROGRESS NOTES
"CC: Mr. Roberto Carlos Souza arrives today for management of Type 2 DM and review of chronic medical conditions, as listed in the visit diagnosis section of this encounter. He arrives alone today.     HPI: Mr. Roberto Carlos Souza was diagnosed with Type 2 DM in 1/2017 by PCP, presented with fatigue. A1c 9.9%. Started on metformin. Did not tolerate, converted to XR formulation which he does tolerate better.   Previous DM treatments: none  Last seen in endocrine by Dr. Alvarez 5/2017 - A1c 6.7% at that time. He is new to me today. No recent A1c available.   No specific complaints today.     CURRENT DIABETIC MEDS: metformin XR 1000mg BID    Glucometer type: Contour Next    BG readings are checked 2 x/ week,  No meter or logs in clinic, reports last .     Hypoglycemia: No    Missing Insulin/PO medication doses: No    Exercise: Yes; walks dog     Dietary Habits: The patient eats a regular, healthy diet. skips breakfast, 2 meals daily; UNRULY - sandwich; DIN - grilled meat, vegetable; eats half out/ half at home; drinks water and diet coke    Last DM education appointment: 2/2017 Cassandra Plummer RD    REVIEW OF SYSTEMS  Constitutional: no c/o fatigue, weakness, + intentional weight loss.   Eyes: denies visual disturbances.  Cardiac: no palpitations or chest pain.  Respiratory: no SOB, FLORES, or cough  GI: no N/V/D, abdominal pain   Skin: no lesions or rashes.  Neuro: no numbness, tingling, or parasthesias.  Endocrine: denies polyphagia, polydipsia, polyuria    Personally reviewed Past Medical, Surgical, Social History.    Vital Signs  /79 (BP Location: Left arm, Patient Position: Sitting)   Pulse 80   Ht 6' 1" (1.854 m)   Wt 123.3 kg (271 lb 12.8 oz)   BMI 35.86 kg/m²     Personally reviewed the below labs:    Hemoglobin A1C   Date Value Ref Range Status   05/16/2017 6.7 (H) 4.5 - 6.2 % Final     Comment:     According to ADA guidelines, hemoglobin A1C <7.0% represents  optimal control in non-pregnant diabetic patients.  " Different  metrics may apply to specific populations.   Standards of Medical Care in Diabetes - 2016.  For the purpose of screening for the presence of diabetes:  <5.7%     Consistent with the absence of diabetes  5.7-6.4%  Consistent with increasing risk for diabetes   (prediabetes)  >or=6.5%  Consistent with diabetes  Currently no consensus exists for use of hemoglobin A1C  for diagnosis of diabetes for children.     01/11/2017 9.9 (H) 4.5 - 6.2 % Final     Comment:     According to ADA guidelines, hemoglobin A1C <7.0% represents  optimal control in non-pregnant diabetic patients.  Different  metrics may apply to specific populations.   Standards of Medical Care in Diabetes - 2016.  For the purpose of screening for the presence of diabetes:  <5.7%     Consistent with the absence of diabetes  5.7-6.4%  Consistent with increasing risk for diabetes   (prediabetes)  >or=6.5%  Consistent with diabetes  Currently no consensus exists for use of hemoglobin A1C  for diagnosis of diabetes for children.         Chemistry        Component Value Date/Time     01/11/2017 1544    K 4.0 01/11/2017 1544     01/11/2017 1544    CO2 20 (L) 01/11/2017 1544    BUN 10 01/11/2017 1544    CREATININE 0.9 01/11/2017 1544     (H) 01/11/2017 1544        Component Value Date/Time    CALCIUM 9.7 01/11/2017 1544    ALKPHOS 82 01/11/2017 1544    AST 57 (H) 01/11/2017 1544    ALT 96 (H) 01/11/2017 1544    BILITOT 0.5 01/11/2017 1544    ESTGFRAFRICA >60.0 01/11/2017 1544    EGFRNONAA >60.0 01/11/2017 1544          Lab Results   Component Value Date    CHOL 240 (H) 01/11/2017    CHOL 161 05/18/2011     Lab Results   Component Value Date    HDL 28 (L) 01/11/2017     Lab Results   Component Value Date    LDLCALC Invalid, Trig>400.0 01/11/2017     Lab Results   Component Value Date    TRIG 419 (H) 01/11/2017     Lab Results   Component Value Date    CHOLHDL 11.7 (L) 01/11/2017       No results found for: MICALBCREAT  Lab Results    Component Value Date    TSH 2.239 01/11/2017       PHYSICAL EXAMINATION  Constitutional: Appears well, no distress  Neck: Supple, trachea midline  Respiratory: CTA, even and unlabored.  Cardiovascular: RRR, no murmurs, no carotid bruits. no edema.    Abdomen: soft, non tender, non distended, active BS x 4  Skin: warm and dry; no acanthosis nigracans observed.    Assessment/Plan  1. Controlled type 2 diabetes mellitus without complication, without long-term current use of insulin  Hemoglobin A1c    Lipid panel    Comprehensive metabolic panel    Microalbumin/creatinine urine ratio    Discussed diagnosis of DM, progression of disease, long term complications and tx options. Reviewed A1c and BG goals. Discussed pain, stress, infection, medications, diet and exercise and impact on DM control. Discussed DM diet, limiting carbs, portion size.   Instructed to monitor BG as directed, bring logs/ meter to clinic visits.   - LDL invalid as trig level >400; discussed low fat/ low chol diet. Need to repeat lipid panel.   - no ASA, no ACEi, no statin   2. Non morbid obesity due to excess calories  Body mass index is 35.86 kg/m².  may contribute to insulin resistance  - discussed intentional wt loss with changes to diet. Recommended physical activity on most days of week.        FOLLOW UP  Return in about 6 months (around 3/1/2018).   Obtain current labs in 1-2 weeks fasting.   He would prefer a phone call with lab results. Not interested in [facility].

## 2017-09-13 ENCOUNTER — OFFICE VISIT (OUTPATIENT)
Dept: PSYCHIATRY | Facility: CLINIC | Age: 22
End: 2017-09-13
Payer: COMMERCIAL

## 2017-09-13 VITALS
HEART RATE: 79 BPM | DIASTOLIC BLOOD PRESSURE: 86 MMHG | WEIGHT: 273.63 LBS | BODY MASS INDEX: 36.27 KG/M2 | SYSTOLIC BLOOD PRESSURE: 144 MMHG | HEIGHT: 73 IN

## 2017-09-13 DIAGNOSIS — F33.42 RECURRENT MAJOR DEPRESSIVE DISORDER, IN FULL REMISSION: Primary | ICD-10-CM

## 2017-09-13 DIAGNOSIS — F41.1 GAD (GENERALIZED ANXIETY DISORDER): ICD-10-CM

## 2017-09-13 PROCEDURE — 3077F SYST BP >= 140 MM HG: CPT | Mod: S$GLB,,, | Performed by: NURSE PRACTITIONER

## 2017-09-13 PROCEDURE — 3079F DIAST BP 80-89 MM HG: CPT | Mod: S$GLB,,, | Performed by: NURSE PRACTITIONER

## 2017-09-13 PROCEDURE — 99213 OFFICE O/P EST LOW 20 MIN: CPT | Mod: S$GLB,,, | Performed by: NURSE PRACTITIONER

## 2017-09-13 PROCEDURE — 3008F BODY MASS INDEX DOCD: CPT | Mod: S$GLB,,, | Performed by: NURSE PRACTITIONER

## 2017-09-13 PROCEDURE — 99999 PR PBB SHADOW E&M-EST. PATIENT-LVL III: CPT | Mod: PBBFAC,,, | Performed by: NURSE PRACTITIONER

## 2017-09-13 PROCEDURE — 99213 OFFICE O/P EST LOW 20 MIN: CPT | Mod: PBBFAC | Performed by: NURSE PRACTITIONER

## 2017-09-13 RX ORDER — BUPROPION HYDROCHLORIDE 150 MG/1
150 TABLET ORAL DAILY
Qty: 30 TABLET | Refills: 3 | Status: SHIPPED | OUTPATIENT
Start: 2017-09-13 | End: 2017-09-14 | Stop reason: SDUPTHER

## 2017-09-13 RX ORDER — FLUOXETINE 10 MG/1
10 CAPSULE ORAL DAILY
Qty: 30 CAPSULE | Refills: 3 | Status: SHIPPED | OUTPATIENT
Start: 2017-09-13 | End: 2017-12-21 | Stop reason: SDUPTHER

## 2017-09-13 NOTE — PROGRESS NOTES
Outpatient Psychiatry Follow-Up Visit (MD/NP)    9/13/2017    Clinical Status of Patient:  Outpatient (Ambulatory)    Chief Complaint:  Roberto Carlos Souza is a 22 y.o. male who presents today for follow-up of depression and anxiety.  Met with patient.      Interval History and Content of Current Session:  Interim Events/Subjective Report/Content of Current Session: Mr Souza arrived on time for his appointment.  He was appropriately dressed.  Appearance neat and clean.   Mr Souza stated that his mood was good; however he ran out of his medication about 3 days ago and noticed depressive symptoms returning.  He feels that the medication is working well.  No c/o side effects.  Diabetes is controlled and he is about to begin seeing a Endocrinologist to manage the Diabetes.  Sleep and appetite good.   Some weight loss; however intentional.  Denies SI/HI, denies A/V hallucinations.         Psychotherapy:  · Target symptoms: depression, anxiety   · Why chosen therapy is appropriate versus another modality: relevant to diagnosis, evidence based practice  · Outcome monitoring methods: self-report, observation  · Therapeutic intervention type: supportive psychotherapy  · Topics discussed/themes: work stress  · The patient's response to the intervention is accepting. The patient's progress toward treatment goals is good.   · Duration of intervention: 15 minutes.    Review of Systems   GENERAL: No weight gain or loss   SKIN: No rashes or lacerations   HEAD: No headaches   EYES: No exophthalmos, jaundice or blindness   EARS: No dizziness, tinnitus or hearing loss   NOSE: No changes in smell   MOUTH & THROAT: No dyskinetic movements or obvious goiter   CHEST: No shortness of breath, hyperventilation or cough   CARDIOVASCULAR: No tachycardia or chest pain   ABDOMEN: No nausea, vomiting, pain, constipation or diarrhea   URINARY: No frequency, dysuria or sexual dysfunction   ENDOCRINE: No polydipsia, polyuria   MUSCULOSKELETAL: No pain  "or stiffness of the joints   NEUROLOGIC: No weakness, sensory changes, seizures, confusion, memory loss, tremor or other abnormal movements      Psychiatric Review Of Systems:  sleep: no  appetite changes: no  weight changes: yes, weight loss  energy/anergy: no  interest/pleasure/anhedonia: no  somatic symptoms: no  libido: not assessed  anxiety/panic: no      Past Medical, Family and Social History: The patient's past medical, family and social history have been reviewed and updated as appropriate within the electronic medical record - see encounter notes.    Compliance: yes    Side effects: None    Risk Parameters:  Patient reports no suicidal ideation  Patient reports no homicidal ideation  Patient reports no self-injurious behavior  Patient reports no violent behavior    Exam (detailed: at least 9 elements; comprehensive: all 15 elements)   Constitutional  Vitals:  Most recent vital signs, dated less than 90 days prior to this appointment, were reviewed.   Vitals:    09/13/17 1302   BP: (!) 144/86   Pulse: 79   Weight: 124.1 kg (273 lb 9.6 oz)   Height: 6' 1" (1.854 m)        General:  unremarkable, age appropriate     Musculoskeletal  Muscle Strength/Tone:  not examined   Gait & Station:  non-ataxic     Psychiatric  Speech:  no latency; no press   Mood & Affect:  happy  congruent and appropriate   Thought Process:  normal and logical   Associations:  intact   Thought Content:  normal, no suicidality, no homicidality, delusions, or paranoia   Insight:  intact   Judgement: behavior is adequate to circumstances   Orientation:  grossly intact   Memory: intact for content of interview   Language: grossly intact   Attention Span & Concentration:  able to focus   Fund of Knowledge:  intact and appropriate to age and level of education     Assessment and Diagnosis   Status/Progress: Based on the examination today, the patient's problem(s) is/are well controlled.  New problems have not been presented today.   " Co-morbidities, Diagnostic uncertainty and Lack of compliance are not complicating management of the primary condition.  There are no active rule-out diagnoses for this patient at this time.     General Impression:   Major Depressive Disorder, Recurrent, Moderate, In Remission  Generalized Anxiety Disorder      Intervention/Counseling/Treatment Plan   · Medication Management: Continue current medications. The risks and benefits of medication were discussed with the patient.   · Continue Wellbutrin  mg daily  · Continue Prozac 10 mg daily      Return to Clinic: 3 months

## 2017-09-14 DIAGNOSIS — F33.42 RECURRENT MAJOR DEPRESSIVE DISORDER, IN FULL REMISSION: Primary | ICD-10-CM

## 2017-09-14 RX ORDER — BUPROPION HYDROCHLORIDE 150 MG/1
150 TABLET ORAL DAILY
Qty: 30 TABLET | Refills: 3 | OUTPATIENT
Start: 2017-09-14 | End: 2017-12-21 | Stop reason: SDUPTHER

## 2017-09-15 ENCOUNTER — LAB VISIT (OUTPATIENT)
Dept: LAB | Facility: HOSPITAL | Age: 22
End: 2017-09-15
Payer: COMMERCIAL

## 2017-09-15 DIAGNOSIS — E11.9 CONTROLLED TYPE 2 DIABETES MELLITUS WITHOUT COMPLICATION, WITHOUT LONG-TERM CURRENT USE OF INSULIN: ICD-10-CM

## 2017-09-15 LAB
ALBUMIN SERPL BCP-MCNC: 3.6 G/DL
ALP SERPL-CCNC: 58 U/L
ALT SERPL W/O P-5'-P-CCNC: 68 U/L
ANION GAP SERPL CALC-SCNC: 10 MMOL/L
AST SERPL-CCNC: 35 U/L
BILIRUB SERPL-MCNC: 0.6 MG/DL
BUN SERPL-MCNC: 15 MG/DL
CALCIUM SERPL-MCNC: 9.3 MG/DL
CHLORIDE SERPL-SCNC: 106 MMOL/L
CHOLEST SERPL-MCNC: 199 MG/DL
CHOLEST/HDLC SERPL: 6.6 {RATIO}
CO2 SERPL-SCNC: 24 MMOL/L
CREAT SERPL-MCNC: 0.8 MG/DL
EST. GFR  (AFRICAN AMERICAN): >60 ML/MIN/1.73 M^2
EST. GFR  (NON AFRICAN AMERICAN): >60 ML/MIN/1.73 M^2
ESTIMATED AVG GLUCOSE: 137 MG/DL
GLUCOSE SERPL-MCNC: 151 MG/DL
HBA1C MFR BLD HPLC: 6.4 %
HDLC SERPL-MCNC: 30 MG/DL
HDLC SERPL: 15.1 %
LDLC SERPL CALC-MCNC: 120 MG/DL
NONHDLC SERPL-MCNC: 169 MG/DL
POTASSIUM SERPL-SCNC: 3.9 MMOL/L
PROT SERPL-MCNC: 6.9 G/DL
SODIUM SERPL-SCNC: 140 MMOL/L
TRIGL SERPL-MCNC: 245 MG/DL

## 2017-09-15 PROCEDURE — 83036 HEMOGLOBIN GLYCOSYLATED A1C: CPT

## 2017-09-15 PROCEDURE — 36415 COLL VENOUS BLD VENIPUNCTURE: CPT

## 2017-09-15 PROCEDURE — 80053 COMPREHEN METABOLIC PANEL: CPT

## 2017-09-15 PROCEDURE — 80061 LIPID PANEL: CPT

## 2017-12-21 ENCOUNTER — OFFICE VISIT (OUTPATIENT)
Dept: PSYCHIATRY | Facility: CLINIC | Age: 22
End: 2017-12-21
Payer: COMMERCIAL

## 2017-12-21 DIAGNOSIS — F41.1 GAD (GENERALIZED ANXIETY DISORDER): ICD-10-CM

## 2017-12-21 DIAGNOSIS — F33.42 RECURRENT MAJOR DEPRESSIVE DISORDER, IN FULL REMISSION: Primary | ICD-10-CM

## 2017-12-21 PROCEDURE — 99212 OFFICE O/P EST SF 10 MIN: CPT | Mod: S$GLB,,, | Performed by: NURSE PRACTITIONER

## 2017-12-21 PROCEDURE — 99999 PR PBB SHADOW E&M-EST. PATIENT-LVL I: CPT | Mod: PBBFAC,,, | Performed by: NURSE PRACTITIONER

## 2017-12-21 RX ORDER — BUPROPION HYDROCHLORIDE 150 MG/1
150 TABLET ORAL DAILY
Qty: 30 TABLET | Refills: 5 | Status: SHIPPED | OUTPATIENT
Start: 2017-12-21 | End: 2018-03-14 | Stop reason: SDUPTHER

## 2017-12-21 RX ORDER — FLUOXETINE 10 MG/1
10 CAPSULE ORAL DAILY
Qty: 30 CAPSULE | Refills: 5 | Status: SHIPPED | OUTPATIENT
Start: 2017-12-21 | End: 2018-03-14 | Stop reason: SDUPTHER

## 2018-03-14 ENCOUNTER — OFFICE VISIT (OUTPATIENT)
Dept: PSYCHIATRY | Facility: CLINIC | Age: 23
End: 2018-03-14
Payer: COMMERCIAL

## 2018-03-14 VITALS
SYSTOLIC BLOOD PRESSURE: 139 MMHG | BODY MASS INDEX: 36.75 KG/M2 | HEART RATE: 83 BPM | DIASTOLIC BLOOD PRESSURE: 84 MMHG | HEIGHT: 73 IN | WEIGHT: 277.31 LBS

## 2018-03-14 DIAGNOSIS — F33.42 RECURRENT MAJOR DEPRESSIVE DISORDER, IN FULL REMISSION: Primary | ICD-10-CM

## 2018-03-14 DIAGNOSIS — F41.1 GAD (GENERALIZED ANXIETY DISORDER): ICD-10-CM

## 2018-03-14 PROCEDURE — 99999 PR PBB SHADOW E&M-EST. PATIENT-LVL II: CPT | Mod: PBBFAC,,, | Performed by: NURSE PRACTITIONER

## 2018-03-14 PROCEDURE — 99213 OFFICE O/P EST LOW 20 MIN: CPT | Mod: S$GLB,,, | Performed by: NURSE PRACTITIONER

## 2018-03-14 PROCEDURE — 90833 PSYTX W PT W E/M 30 MIN: CPT | Mod: S$GLB,,, | Performed by: NURSE PRACTITIONER

## 2018-03-14 PROCEDURE — 90833 PSYTX W PT W E/M 30 MIN: CPT | Mod: PBBFAC | Performed by: NURSE PRACTITIONER

## 2018-03-14 RX ORDER — BUPROPION HYDROCHLORIDE 150 MG/1
150 TABLET ORAL DAILY
Qty: 90 TABLET | Refills: 2 | Status: SHIPPED | OUTPATIENT
Start: 2018-03-14 | End: 2020-01-09 | Stop reason: SDUPTHER

## 2018-03-14 RX ORDER — FLUOXETINE 10 MG/1
10 CAPSULE ORAL DAILY
Qty: 90 CAPSULE | Refills: 2 | Status: SHIPPED | OUTPATIENT
Start: 2018-03-14 | End: 2019-03-04 | Stop reason: SDUPTHER

## 2018-03-14 NOTE — PROGRESS NOTES
"Outpatient Psychiatry Follow-Up Visit (MD/NP)    3/14/2018    Clinical Status of Patient:  Outpatient (Ambulatory)    Chief Complaint:  Roberto Carlos Souza is a 22 y.o. male who presents today for follow-up of depression and anxiety.  Met with patient.  Last visit was on 12/21/17. Chart reveiwed.    Interval History and Content of Current Session:  Psychiatric Medication Profile  · Continue Wellbutrin  mg daily  · Continue Prozac 10 mg daily    Pt presents with bright affect and euthymic mood.  Stated, I'm doing good".  Pt moving out of parents house and off their insurance plan so he wanted to do an early follow-up beforehand.  Stated, " I feel like I'm on the right medicine at the right dosage."  Denies side effects or adverse reactions.  Denies SI/HI/AVH.  No changes to dose in 2 years.  Will follow/up in 6 months.     Psychotherapy:  · Target symptoms: depression, anxiety   · Why chosen therapy is appropriate versus another modality: relevant to diagnosis, evidence based practice  · Outcome monitoring methods: self-report, observation  · Therapeutic intervention type: supportive psychotherapy  · Topics discussed/themes: work stress  · The patient's response to the intervention is accepting. The patient's progress toward treatment goals is good.   · Duration of intervention: 17 minutes.    Review of Systems   GENERAL: No weight gain or loss   SKIN: No rashes or lacerations   HEAD: No headaches   EYES: No exophthalmos, jaundice or blindness   EARS: No dizziness, tinnitus or hearing loss   NOSE: No changes in smell   MOUTH & THROAT: No dyskinetic movements or obvious goiter   CHEST: No shortness of breath, hyperventilation or cough   CARDIOVASCULAR: No tachycardia or chest pain   ABDOMEN: No nausea, vomiting, pain, constipation or diarrhea   URINARY: No frequency, dysuria or sexual dysfunction   ENDOCRINE: No polydipsia, polyuria   MUSCULOSKELETAL: No pain or stiffness of the joints   NEUROLOGIC: No weakness, " "sensory changes, seizures, confusion, memory loss, tremor or other abnormal movements      Psychiatric Review Of Systems:  sleep: no  appetite changes: no  weight changes: yes, weight loss  energy/anergy: no  interest/pleasure/anhedonia: no  somatic symptoms: no  libido: not assessed  anxiety/panic: no      Past Medical, Family and Social History: The patient's past medical, family and social history have been reviewed and updated as appropriate within the electronic medical record - see encounter notes.    Compliance: yes    Side effects: None    Risk Parameters:  Patient reports no suicidal ideation  Patient reports no homicidal ideation  Patient reports no self-injurious behavior  Patient reports no violent behavior    Exam (detailed: at least 9 elements; comprehensive: all 15 elements)   Constitutional  Vitals:  Most recent vital signs, dated less than 90 days prior to this appointment, were not reviewed.   Vitals:    03/14/18 1418   BP: 139/84   Pulse: 83   Weight: 125.8 kg (277 lb 5.4 oz)   Height: 6' 1" (1.854 m)      General:  unremarkable, age appropriate, well nourished, bearded, casually dressed     Musculoskeletal  Muscle Strength/Tone:  no tremor, no tic   Gait & Station:  non-ataxic     Psychiatric  Speech:  no latency; no press   Mood & Affect:  happy  congruent and appropriate   Thought Process:  normal and logical   Associations:  intact   Thought Content:  normal, no suicidality, no homicidality, delusions, or paranoia   Insight:  intact   Judgement: behavior is adequate to circumstances   Orientation:  grossly intact   Memory: intact for content of interview   Language: grossly intact   Attention Span & Concentration:  able to focus   Fund of Knowledge:  intact and appropriate to age and level of education     Assessment and Diagnosis   Status/Progress: Based on the examination today, the patient's problem(s) is/are well controlled.  New problems have not been presented today.   Co-morbidities, " Diagnostic uncertainty and Lack of compliance are not complicating management of the primary condition.  There are no active rule-out diagnoses for this patient at this time.     General Impression:   .Diagnoses and all orders for this visit:    Recurrent major depressive disorder, in full remission  -     FLUoxetine (PROZAC) 10 MG capsule; Take 1 capsule (10 mg total) by mouth once daily.  -     buPROPion (WELLBUTRIN XL) 150 MG TB24 tablet; Take 1 tablet (150 mg total) by mouth once daily.    MARCOS (generalized anxiety disorder)  -     FLUoxetine (PROZAC) 10 MG capsule; Take 1 capsule (10 mg total) by mouth once daily.  -     buPROPion (WELLBUTRIN XL) 150 MG TB24 tablet; Take 1 tablet (150 mg total) by mouth once daily.      Intervention/Counseling/Treatment Plan   · Medication Management: Continue current medications. The risks and benefits of medication were discussed with the patient.   · Continue Wellbutrin  mg daily  · Continue Prozac 10 mg daily  · Ordered 90 day refills. Okay to refill outside appointments.   · Reviewed most recent labs from 9/15/17:  Elevated A1C, Glucose elevated in CMP, and Triglycerides elevated in Lipid Panel. Pt will F/U with PCP.     Return to Clinic: 6 months

## 2018-05-15 RX ORDER — METFORMIN HYDROCHLORIDE 500 MG/1
1000 TABLET, EXTENDED RELEASE ORAL 2 TIMES DAILY WITH MEALS
Qty: 120 TABLET | Refills: 2 | Status: SHIPPED | OUTPATIENT
Start: 2018-05-15 | End: 2018-07-25 | Stop reason: SINTOL

## 2018-07-20 ENCOUNTER — TELEPHONE (OUTPATIENT)
Dept: ENDOCRINOLOGY | Facility: CLINIC | Age: 23
End: 2018-07-20

## 2018-07-20 ENCOUNTER — PATIENT MESSAGE (OUTPATIENT)
Dept: ENDOCRINOLOGY | Facility: CLINIC | Age: 23
End: 2018-07-20

## 2018-07-20 DIAGNOSIS — Z79.4 TYPE 2 DIABETES MELLITUS WITHOUT COMPLICATION, WITH LONG-TERM CURRENT USE OF INSULIN: Primary | ICD-10-CM

## 2018-07-20 DIAGNOSIS — E11.9 TYPE 2 DIABETES MELLITUS WITHOUT COMPLICATION, WITH LONG-TERM CURRENT USE OF INSULIN: Primary | ICD-10-CM

## 2018-07-20 DIAGNOSIS — E11.9 CONTROLLED TYPE 2 DIABETES MELLITUS WITHOUT COMPLICATION, WITHOUT LONG-TERM CURRENT USE OF INSULIN: Primary | ICD-10-CM

## 2018-07-23 ENCOUNTER — LAB VISIT (OUTPATIENT)
Dept: LAB | Facility: HOSPITAL | Age: 23
End: 2018-07-23
Attending: NURSE PRACTITIONER
Payer: COMMERCIAL

## 2018-07-23 DIAGNOSIS — E11.9 CONTROLLED TYPE 2 DIABETES MELLITUS WITHOUT COMPLICATION, WITHOUT LONG-TERM CURRENT USE OF INSULIN: ICD-10-CM

## 2018-07-23 LAB
ALBUMIN SERPL BCP-MCNC: 4.1 G/DL
ALP SERPL-CCNC: 60 U/L
ALT SERPL W/O P-5'-P-CCNC: 117 U/L
ANION GAP SERPL CALC-SCNC: 13 MMOL/L
AST SERPL-CCNC: 62 U/L
BILIRUB SERPL-MCNC: 0.6 MG/DL
BUN SERPL-MCNC: 10 MG/DL
CALCIUM SERPL-MCNC: 9.8 MG/DL
CHLORIDE SERPL-SCNC: 106 MMOL/L
CHOLEST SERPL-MCNC: 214 MG/DL
CHOLEST/HDLC SERPL: 6.3 {RATIO}
CO2 SERPL-SCNC: 22 MMOL/L
CREAT SERPL-MCNC: 0.8 MG/DL
EST. GFR  (AFRICAN AMERICAN): >60 ML/MIN/1.73 M^2
EST. GFR  (NON AFRICAN AMERICAN): >60 ML/MIN/1.73 M^2
ESTIMATED AVG GLUCOSE: 146 MG/DL
GLUCOSE SERPL-MCNC: 143 MG/DL
HBA1C MFR BLD HPLC: 6.7 %
HDLC SERPL-MCNC: 34 MG/DL
HDLC SERPL: 15.9 %
LDLC SERPL CALC-MCNC: 129 MG/DL
NONHDLC SERPL-MCNC: 180 MG/DL
POTASSIUM SERPL-SCNC: 4.1 MMOL/L
PROT SERPL-MCNC: 7.1 G/DL
SODIUM SERPL-SCNC: 141 MMOL/L
TRIGL SERPL-MCNC: 255 MG/DL

## 2018-07-23 PROCEDURE — 80061 LIPID PANEL: CPT

## 2018-07-23 PROCEDURE — 36415 COLL VENOUS BLD VENIPUNCTURE: CPT

## 2018-07-23 PROCEDURE — 80053 COMPREHEN METABOLIC PANEL: CPT

## 2018-07-23 PROCEDURE — 83036 HEMOGLOBIN GLYCOSYLATED A1C: CPT

## 2018-07-25 ENCOUNTER — OFFICE VISIT (OUTPATIENT)
Dept: ENDOCRINOLOGY | Facility: CLINIC | Age: 23
End: 2018-07-25
Payer: COMMERCIAL

## 2018-07-25 VITALS
SYSTOLIC BLOOD PRESSURE: 128 MMHG | HEART RATE: 64 BPM | HEIGHT: 73 IN | BODY MASS INDEX: 36.96 KG/M2 | DIASTOLIC BLOOD PRESSURE: 84 MMHG | WEIGHT: 278.88 LBS

## 2018-07-25 DIAGNOSIS — E66.09 NON MORBID OBESITY DUE TO EXCESS CALORIES: ICD-10-CM

## 2018-07-25 DIAGNOSIS — E11.9 CONTROLLED TYPE 2 DIABETES MELLITUS WITHOUT COMPLICATION, WITHOUT LONG-TERM CURRENT USE OF INSULIN: Primary | ICD-10-CM

## 2018-07-25 PROCEDURE — 99999 PR PBB SHADOW E&M-EST. PATIENT-LVL III: CPT | Mod: PBBFAC,,, | Performed by: NURSE PRACTITIONER

## 2018-07-25 PROCEDURE — 99214 OFFICE O/P EST MOD 30 MIN: CPT | Mod: S$GLB,,, | Performed by: NURSE PRACTITIONER

## 2018-07-25 NOTE — PATIENT INSTRUCTIONS
Canagliflozin oral tablets  What is this medicine?  CANAGLIFLOZIN (BHARGAV a gli FLOE zin) helps to treat type 2 diabetes. It helps to control blood sugar. Treatment is combined with diet and exercise.  How should I use this medicine?  Take this medicine by mouth with a glass of water. Follow the directions on the prescription label. Take it before the first meal of the day. Take your dose at the same time each day. Do not take more often than directed. Do not stop taking except on your doctor's advice.  A special MedGuide will be given to you by the pharmacist with each prescription and refill. Be sure to read this information carefully each time.  Talk to your pediatrician regarding the use of this medicine in children. Special care may be needed.  What side effects may I notice from receiving this medicine?  Side effects that you should report to your doctor or health care professional as soon as possible:  · allergic reactions like skin rash, itching or hives, swelling of the face, lips, or tongue  · breathing problems  · chest pain  · dizziness  · fast or irregular heartbeat  · feeling faint or lightheaded, falls  · muscle weakness  · nausea, vomiting, unusual stomach upset or pain  · new pain or tenderness, change in skin color, sores or ulcers, or infection in legs or feet  · signs and symptoms of low blood sugar such as feeling anxious, confusion, dizziness, increased hunger, unusually weak or tired, sweating, shakiness, cold, irritable, headache, blurred vision, fast heartbeat, loss of consciousness  · signs and symptoms of a urinary tract infection, such as fever, chills, a burning feeling when urinating, blood in the urine, back pain  · trouble passing urine or change in the amount of urine, including an urgent need to urinate more often, in larger amounts, or at night  · penile discharge, itching, or pain in men  · unusual tiredness  · vaginal discharge, itching, or odor in women  Side effects that usually  do not require medical attention (Report these to your doctor or health care professional if they continue or are bothersome.):  · constipation  · mild increase in urination  · thirsty  What may interact with this medicine?  Do not take this medicine with any of the following medications:  · gatifloxacinThis medicine may also interact with the following medications:  · alcohol  · certain medicines for blood pressure, heart disease  · digoxin  · diuretics  · insulin  · nateglinide  · phenobarbital  · phenytoin  · repaglinide  · rifampin  · ritonavir  · sulfonylureas like glimepiride, glipizide, glyburide  What if I miss a dose?  If you miss a dose, take it as soon as you can. If it is almost time for your next dose, take only that dose. Do not take double or extra doses.  Where should I keep my medicine?  Keep out of the reach of children.  Store at room temperature between 20 and 25 degrees C (68 and 77 degrees F). Throw away any unused medicine after the expiration date.  What should I tell my health care provider before I take this medicine?  They need to know if you have any of these conditions:  · dehydration  · diabetic ketoacidosis  · diet low in salt  · eating less due to illness, surgery, dieting, or any other reason  · having surgery  · high cholesterol  · high levels of potassium in the blood  · history of pancreatitis or pancreas problems  · history of yeast infection of the penis or vagina  · if you often drink alcohol  · infections in the bladder, kidneys, or urinary tract  · kidney disease  · liver disease  · low blood pressure  · on hemodialysis  · problems urinating  · type 1 diabetes  · uncircumcised male  · an unusual or allergic reaction to canagliflozin, other medicines, foods, dyes, or preservatives  · pregnant or trying to get pregnant  · breast-feeding  What should I watch for while using this medicine?  Visit your doctor or health care professional for regular checks on your progress.  This  medicine can cause a serious condition in which there is too much acid in the blood. If you develop nausea, vomiting, stomach pain, unusual tiredness, or breathing problems, stop taking this medicine and call your doctor right away. If possible, use a ketone dipstick to check for ketones in your urine.  A test called the HbA1C (A1C) will be monitored. This is a simple blood test. It measures your blood sugar control over the last 2 to 3 months. You will receive this test every 3 to 6 months.  Learn how to check your blood sugar. Learn the symptoms of low and high blood sugar and how to manage them.  Always carry a quick-source of sugar with you in case you have symptoms of low blood sugar. Examples include hard sugar candy or glucose tablets. Make sure others know that you can choke if you eat or drink when you develop serious symptoms of low blood sugar, such as seizures or unconsciousness. They must get medical help at once.  Tell your doctor or health care professional if you have high blood sugar. You might need to change the dose of your medicine. If you are sick or exercising more than usual, you might need to change the dose of your medicine.  Do not skip meals. Ask your doctor or health care professional if you should avoid alcohol. Many nonprescription cough and cold products contain sugar or alcohol. These can affect blood sugar.  Wear a medical ID bracelet or chain, and carry a card that describes your disease and details of your medicine and dosage times.  NOTE:This sheet is a summary. It may not cover all possible information. If you have questions about this medicine, talk to your doctor, pharmacist, or health care provider. Copyright© 2017 [employer]
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
## 2018-08-21 RX ORDER — CANAGLIFLOZIN 100 MG/1
TABLET, FILM COATED ORAL
Qty: 30 TABLET | Refills: 0 | OUTPATIENT
Start: 2018-08-21

## 2018-10-30 ENCOUNTER — LAB VISIT (OUTPATIENT)
Dept: LAB | Facility: HOSPITAL | Age: 23
End: 2018-10-30
Payer: COMMERCIAL

## 2018-10-30 DIAGNOSIS — E11.9 CONTROLLED TYPE 2 DIABETES MELLITUS WITHOUT COMPLICATION, WITHOUT LONG-TERM CURRENT USE OF INSULIN: ICD-10-CM

## 2018-10-30 LAB
ALBUMIN SERPL BCP-MCNC: 4 G/DL
ALP SERPL-CCNC: 67 U/L
ALT SERPL W/O P-5'-P-CCNC: 54 U/L
AST SERPL-CCNC: 27 U/L
BILIRUB DIRECT SERPL-MCNC: 0.2 MG/DL
BILIRUB SERPL-MCNC: 0.6 MG/DL
ESTIMATED AVG GLUCOSE: 163 MG/DL
HBA1C MFR BLD HPLC: 7.3 %
PROT SERPL-MCNC: 6.9 G/DL

## 2018-10-30 PROCEDURE — 36415 COLL VENOUS BLD VENIPUNCTURE: CPT

## 2018-10-30 PROCEDURE — 80076 HEPATIC FUNCTION PANEL: CPT

## 2018-10-30 PROCEDURE — 83036 HEMOGLOBIN GLYCOSYLATED A1C: CPT

## 2018-11-06 ENCOUNTER — OFFICE VISIT (OUTPATIENT)
Dept: ENDOCRINOLOGY | Facility: CLINIC | Age: 23
End: 2018-11-06
Payer: COMMERCIAL

## 2018-11-06 VITALS
WEIGHT: 267.69 LBS | HEIGHT: 73 IN | BODY MASS INDEX: 35.48 KG/M2 | HEART RATE: 76 BPM | SYSTOLIC BLOOD PRESSURE: 130 MMHG | DIASTOLIC BLOOD PRESSURE: 90 MMHG | RESPIRATION RATE: 20 BRPM

## 2018-11-06 DIAGNOSIS — E11.9 TYPE 2 DIABETES MELLITUS WITHOUT COMPLICATION, WITHOUT LONG-TERM CURRENT USE OF INSULIN: Primary | ICD-10-CM

## 2018-11-06 DIAGNOSIS — E66.09 NON MORBID OBESITY DUE TO EXCESS CALORIES: ICD-10-CM

## 2018-11-06 PROCEDURE — 99999 PR PBB SHADOW E&M-EST. PATIENT-LVL III: CPT | Mod: PBBFAC,,, | Performed by: NURSE PRACTITIONER

## 2018-11-06 PROCEDURE — 99213 OFFICE O/P EST LOW 20 MIN: CPT | Mod: S$GLB,,, | Performed by: NURSE PRACTITIONER

## 2018-11-06 NOTE — PROGRESS NOTES
"CC: Mr. Roberto Carlos Souza arrives today for management of Type 2 DM and review of chronic medical conditions, as listed in the visit diagnosis section of this encounter. He arrives alone today.     HPI: Mr. Roberto Carlos Souza was diagnosed with Type 2 DM in 1/2017 by PCP, presented with fatigue. A1c 9.9%. Started on metformin. Did not tolerate, converted to XR formulation.   Previous DM treatments: metformin XR d/c'd side effects  Seen in endocrine by Dr. Alvarez 5/2017 - A1c 6.7% at that time.   Last seen by me 7/2018 - converted from metformin to Invokana r/t side effects  Continues to work full time    CURRENT DIABETIC MEDS: Invokana 300 mg QD  Missing Insulin/PO medication doses: No    Glucometer type: Contour Next  Not monitoring BG    Hypoglycemia: No    Exercise: slowly resuming exercise, limited since 4/18    Dietary Habits: The patient eats a regular, healthy diet. 2-3 meals daily; BK - granola bar, turkey sandwich; UNRULY - usually eats out/ fast food; DIN - grilled meat, vegetable; minimal snacking, eats more at home recently; drinks water and diet coke    Last DM education appointment: 2/2017 Cassandra Plummer RD  Last eye exam : [id number] ?    REVIEW OF SYSTEMS  Constitutional: no c/o fatigue, weakness, weight down 9# since last appt  Eyes: denies visual disturbances.  Cardiac: no palpitations or chest pain.  Respiratory: no SOB, FLORES, or cough  GI: no N/V, no diarrhea, no abdominal pain   Skin: no lesions or rashes.  Neuro: no numbness, tingling   Endocrine: denies polyphagia, polydipsia, polyuria    Vital Signs  BP (!) 130/90   Pulse 76   Resp 20   Ht 6' 1" (1.854 m)   Wt 121.4 kg (267 lb 11.2 oz)   BMI 35.32 kg/m²     Personally reviewed the below labs:    Hemoglobin A1C   Date Value Ref Range Status   10/30/2018 7.3 (H) 4.0 - 5.6 % Final     Comment:     ADA Screening Guidelines:  5.7-6.4%  Consistent with prediabetes  >or=6.5%  Consistent with diabetes  High levels of fetal hemoglobin interfere with the " HbA1C  assay. Heterozygous hemoglobin variants (HbS, HgC, etc)do  not significantly interfere with this assay.   However, presence of multiple variants may affect accuracy.     07/23/2018 6.7 (H) 4.0 - 5.6 % Final     Comment:     ADA Screening Guidelines:  5.7-6.4%  Consistent with prediabetes  >or=6.5%  Consistent with diabetes  High levels of fetal hemoglobin interfere with the HbA1C  assay. Heterozygous hemoglobin variants (HbS, HgC, etc)do  not significantly interfere with this assay.   However, presence of multiple variants may affect accuracy.     09/15/2017 6.4 (H) 4.0 - 5.6 % Final     Comment:     According to ADA guidelines, hemoglobin A1c <7.0% represents  optimal control in non-pregnant diabetic patients. Different  metrics may apply to specific patient populations.   Standards of Medical Care in Diabetes-2016.  For the purpose of screening for the presence of diabetes:  <5.7%     Consistent with the absence of diabetes  5.7-6.4%  Consistent with increasing risk for diabetes   (prediabetes)  >or=6.5%  Consistent with diabetes  Currently, no consensus exists for use of hemoglobin A1c  for diagnosis of diabetes for children.  This Hemoglobin A1c assay has significant interference with fetal   hemoglobin   (HbF). The results are invalid for patients with abnormal amounts of   HbF,   including those with known Hereditary Persistence   of Fetal Hemoglobin. Heterozygous hemoglobin variants (HbAS, HbAC,   HbAD, HbAE, HbA2) do not significantly interfere with this assay;   however, presence of multiple variants in a sample may impact the %   interference.         Chemistry        Component Value Date/Time     07/23/2018 0905    K 4.1 07/23/2018 0905     07/23/2018 0905    CO2 22 (L) 07/23/2018 0905    BUN 10 07/23/2018 0905    CREATININE 0.8 07/23/2018 0905     (H) 07/23/2018 0905        Component Value Date/Time    CALCIUM 9.8 07/23/2018 0905    ALKPHOS 67 10/30/2018 0903    AST 27  10/30/2018 0903    ALT 54 (H) 10/30/2018 0903    BILITOT 0.6 10/30/2018 0903    ESTGFRAFRICA >60 07/23/2018 0905    EGFRNONAA >60 07/23/2018 0905        Lab Results   Component Value Date    CHOL 214 (H) 07/23/2018    CHOL 199 09/15/2017    CHOL 240 (H) 01/11/2017     Lab Results   Component Value Date    HDL 34 (L) 07/23/2018    HDL 30 (L) 09/15/2017    HDL 28 (L) 01/11/2017     Lab Results   Component Value Date    LDLCALC 129.0 07/23/2018    LDLCALC 120.0 09/15/2017    LDLCALC Invalid, Trig>400.0 01/11/2017     Lab Results   Component Value Date    TRIG 255 (H) 07/23/2018    TRIG 245 (H) 09/15/2017    TRIG 419 (H) 01/11/2017     Lab Results   Component Value Date    CHOLHDL 15.9 (L) 07/23/2018    CHOLHDL 15.1 (L) 09/15/2017    CHOLHDL 11.7 (L) 01/11/2017     Lab Results   Component Value Date    MICALBCREAT 79.8 (H) 07/23/2018     Lab Results   Component Value Date    TSH 2.239 01/11/2017     PHYSICAL EXAMINATION  Constitutional: Appears well, no distress  Neck: Supple, trachea midline  Respiratory: CTA, even and unlabored.  Cardiovascular: RRR, no murmurs, no carotid bruits. no edema.    Abdomen: soft, non tender, non distended, active BS x 4  Skin: warm and dry; no acanthosis nigracans observed.    Assessment/Plan  1. Type 2 diabetes mellitus without complication, without long-term current use of insulin  Hemoglobin A1c    Discussed diagnosis of DM, progression of disease, long term complications and tx options including GLP1 RA  -avoiding metformin XR r/t side effects  -  Continue Invokana 300mg QD, eRx sent  - Reviewed MOA, dosing, side effects, administration   Reviewed A1c and BG goals. Discussed pain, stress, infection, medications, diet and exercise and impact on DM control. Discussed DM diet, limiting carbs, portion size.   Instructed to monitor BG 2-3 x week, bring logs/ meter to clinic visits.   - LDL elevated, not currently on statin therapy; noted LFTs WNL; discussed low fat/ low chol diet and  exercise and impact on lipid panel    - no ASA, no ACEi, no statin   2. Non morbid obesity due to excess calories  Body mass index is 35.32 kg/m².  may contribute to insulin resistance  - discussed intentional wt loss with changes to diet. Recommended increase physical activity on most days of week as tolerated      Orders Placed This Encounter   Procedures    Hemoglobin A1c    Comprehensive metabolic panel    Lipid panel    Microalbumin/creatinine urine ratio       FOLLOW UP  Follow-up in about 6 months (around 5/6/2019). A1c prior to appt  Labs in 6 months at [facility] [place] [facility] lab

## 2018-11-26 DIAGNOSIS — E11.9 TYPE 2 DIABETES MELLITUS WITHOUT COMPLICATION, WITHOUT LONG-TERM CURRENT USE OF INSULIN: ICD-10-CM

## 2018-11-27 DIAGNOSIS — E11.9 TYPE 2 DIABETES MELLITUS WITHOUT COMPLICATION, WITHOUT LONG-TERM CURRENT USE OF INSULIN: Primary | ICD-10-CM

## 2019-02-06 DIAGNOSIS — F33.42 RECURRENT MAJOR DEPRESSIVE DISORDER, IN FULL REMISSION: ICD-10-CM

## 2019-02-06 DIAGNOSIS — F41.1 GAD (GENERALIZED ANXIETY DISORDER): ICD-10-CM

## 2019-02-06 RX ORDER — BUPROPION HYDROCHLORIDE 150 MG/1
150 TABLET ORAL DAILY
Qty: 30 TABLET | Refills: 4 | Status: SHIPPED | OUTPATIENT
Start: 2019-02-06 | End: 2019-04-11 | Stop reason: SDUPTHER

## 2019-03-04 DIAGNOSIS — F33.42 RECURRENT MAJOR DEPRESSIVE DISORDER, IN FULL REMISSION: ICD-10-CM

## 2019-03-04 DIAGNOSIS — F41.1 GAD (GENERALIZED ANXIETY DISORDER): ICD-10-CM

## 2019-03-06 RX ORDER — FLUOXETINE 10 MG/1
10 CAPSULE ORAL DAILY
Qty: 90 CAPSULE | Refills: 1 | Status: SHIPPED | OUTPATIENT
Start: 2019-03-06 | End: 2019-07-11 | Stop reason: SDUPTHER

## 2019-03-07 ENCOUNTER — TELEPHONE (OUTPATIENT)
Dept: ENDOCRINOLOGY | Facility: CLINIC | Age: 24
End: 2019-03-07

## 2019-04-09 NOTE — PROGRESS NOTES
Subjective:      Patient ID: Roberto Carlos oSuza is a 23 y.o. male.    Chief Complaint:  Diabetes    History of Present Illness  Roberto Carlos Souza presents today for follow up of T2DM . Previous patient of MARIJA Mustafa NP. Last seen 11/6/18. This is his first visit with me.     With regards to the diabetes:    Diagnosed with Type 2 DM in 1/2017 by PCP, presented with fatigue. A1c 9.9%. Started on metformin. Did not tolerate, converted to XR formulation.   Previous DM treatments: metformin XR d/c'd side effects  Seen in endocrine by Dr. Alvarez 5/2017 - A1c 6.7% at that time.   Last seen by me 7/2018 - converted from metformin to Invokana r/t side effects    Current regimen:  Invokana 300mg daily    Missed doses: No    Other medications tried:  Metformin     0 times a day testing  Log reviewed: none     Eats 3 meals a day.   Snacks: rarely- granola and chips         Drinks: water and Diet Dr. Pepper     Exercise: running does go to gym      Hypoglycemic event? None   Yes, did not need assistance   Knows how to correct with 15 grams of carbs- juice, coke, or a peppermint.      Education - last visit: none    Has not been to eye doctor, will refer.    Denies feet numbness or tingling.     Denies history of pancreatitis.     Diabetes Management Status  Statin: Not taking  ACE/ARB: Not taking  Screening or Prevention Patient's value Goal Complete/Controlled?   HgA1C Testing and Control   Lab Results   Component Value Date    HGBA1C 7.3 (H) 10/30/2018    Annually/Less than 8% Yes   Lipid profile : 07/23/2018 Annually Yes   LDL control Lab Results   Component Value Date    LDLCALC 129.0 07/23/2018    Annually/Less than 100 mg/dl  No   Nephropathy screening Lab Results   Component Value Date    LABMICR 210.0 07/23/2018     Lab Results   Component Value Date    PROTEINUA Trace (A) 05/18/2011    Annually Yes   Blood pressure BP Readings from Last 1 Encounters:   04/11/19 (!) 144/90    Less than 140/90 Yes   Dilated retinal exam Most  Recent Eye Exam Date: Not Found Annually No   Foot exam   : 09/01/2017 Annually No     Review of Systems   Constitutional: Negative for unexpected weight change.   Eyes: Negative for visual disturbance.   Respiratory: Negative for shortness of breath.    Cardiovascular: Negative for chest pain.   Gastrointestinal: Negative for abdominal pain.   Endocrine: Negative for cold intolerance, heat intolerance, polydipsia, polyphagia and polyuria.   Musculoskeletal: Negative for arthralgias.   Skin: Negative for wound.   Neurological: Negative for headaches.   Hematological: Does not bruise/bleed easily.   Psychiatric/Behavioral: Negative for sleep disturbance.     Objective:   Physical Exam   Neck: No thyromegaly present.   Cardiovascular: Normal rate.   No edema present   Pulmonary/Chest: Effort normal.   Abdominal: Soft.   Vitals reviewed.  Diabetes Foot Exam:   No wounds or ulcers on feet.   Appropriate footwear, Foot exam deferred, per patient request.     Body mass index is 35.28 kg/m².    Lab Review:   Lab Results   Component Value Date    HGBA1C 7.3 (H) 10/30/2018     Lab Results   Component Value Date    CHOL 214 (H) 07/23/2018    HDL 34 (L) 07/23/2018    LDLCALC 129.0 07/23/2018    TRIG 255 (H) 07/23/2018    CHOLHDL 15.9 (L) 07/23/2018     Lab Results   Component Value Date     07/23/2018    K 4.1 07/23/2018     07/23/2018    CO2 22 (L) 07/23/2018     (H) 07/23/2018    BUN 10 07/23/2018    CREATININE 0.8 07/23/2018    CALCIUM 9.8 07/23/2018    PROT 6.9 10/30/2018    ALBUMIN 4.0 10/30/2018    BILITOT 0.6 10/30/2018    ALKPHOS 67 10/30/2018    AST 27 10/30/2018    ALT 54 (H) 10/30/2018    ANIONGAP 13 07/23/2018    ESTGFRAFRICA >60 07/23/2018    EGFRNONAA >60 07/23/2018    TSH 2.239 01/11/2017     Assessment and Plan     1. Type 2 diabetes mellitus without complication, without long-term current use of insulin  Hemoglobin A1c    Comprehensive metabolic panel    Lipid panel     Microalbumin/creatinine urine ratio    dulaglutide (TRULICITY) 0.75 mg/0.5 mL PnIj    dulaglutide (TRULICITY) 1.5 mg/0.5 mL PnIj    Hemoglobin A1c    Comprehensive metabolic panel   2. Non morbid obesity due to excess calories       Type 2 diabetes mellitus without complication, without long-term current use of insulin  -- Labs today  -- Reviewed goals of therapy are to get the best control we can without hypoglycemia  Medication changes:   Continue: Invokana  Start: Trulicity 0.75mg weekly for 2 weeks & then increase to 1.5 mg weekly indefinitely.    -- Discussed compliance in depth   -- Advised frequent self blood glucose monitoring.  Patient encouraged to document glucose results and bring them to every clinic visit    -- Hypoglycemia precautions discussed. Instructed on precautions before driving.    -- Call for Bg repeatedly < 90 or > 180.   -- Close adherence to lifestyle changes recommended.   -- Periodic follow ups for eye evaluations, foot care and dental care suggested.  -- Eye and foot exam upcoming per patient.     Non morbid obesity due to excess calories  -- encouraged dietary and lifestyle modifications   -- emphasized weight loss goals       Follow up in about 3 months (around 7/11/2019).  Labs today. With Yaritza  A1c and CMP prior to next appt.

## 2019-04-11 ENCOUNTER — LAB VISIT (OUTPATIENT)
Dept: LAB | Facility: HOSPITAL | Age: 24
End: 2019-04-11
Payer: COMMERCIAL

## 2019-04-11 ENCOUNTER — OFFICE VISIT (OUTPATIENT)
Dept: ENDOCRINOLOGY | Facility: CLINIC | Age: 24
End: 2019-04-11
Payer: COMMERCIAL

## 2019-04-11 ENCOUNTER — TELEPHONE (OUTPATIENT)
Dept: ENDOCRINOLOGY | Facility: CLINIC | Age: 24
End: 2019-04-11

## 2019-04-11 VITALS
WEIGHT: 267.44 LBS | SYSTOLIC BLOOD PRESSURE: 144 MMHG | HEART RATE: 76 BPM | BODY MASS INDEX: 35.44 KG/M2 | DIASTOLIC BLOOD PRESSURE: 90 MMHG | HEIGHT: 73 IN

## 2019-04-11 DIAGNOSIS — E11.9 TYPE 2 DIABETES MELLITUS WITHOUT COMPLICATION, WITHOUT LONG-TERM CURRENT USE OF INSULIN: Primary | ICD-10-CM

## 2019-04-11 DIAGNOSIS — E11.9 TYPE 2 DIABETES MELLITUS WITHOUT COMPLICATION, WITHOUT LONG-TERM CURRENT USE OF INSULIN: ICD-10-CM

## 2019-04-11 DIAGNOSIS — E66.09 NON MORBID OBESITY DUE TO EXCESS CALORIES: ICD-10-CM

## 2019-04-11 DIAGNOSIS — E78.2 MIXED HYPERLIPIDEMIA: Primary | ICD-10-CM

## 2019-04-11 LAB
ALBUMIN SERPL BCP-MCNC: 4 G/DL (ref 3.5–5.2)
ALP SERPL-CCNC: 69 U/L (ref 55–135)
ALT SERPL W/O P-5'-P-CCNC: 50 U/L (ref 10–44)
ANION GAP SERPL CALC-SCNC: 11 MMOL/L (ref 8–16)
AST SERPL-CCNC: 26 U/L (ref 10–40)
BILIRUB SERPL-MCNC: 0.7 MG/DL (ref 0.1–1)
BUN SERPL-MCNC: 14 MG/DL (ref 6–20)
CALCIUM SERPL-MCNC: 9.6 MG/DL (ref 8.7–10.5)
CHLORIDE SERPL-SCNC: 103 MMOL/L (ref 95–110)
CHOLEST SERPL-MCNC: 239 MG/DL (ref 120–199)
CHOLEST/HDLC SERPL: 6.8 {RATIO} (ref 2–5)
CO2 SERPL-SCNC: 23 MMOL/L (ref 23–29)
CREAT SERPL-MCNC: 0.9 MG/DL (ref 0.5–1.4)
EST. GFR  (AFRICAN AMERICAN): >60 ML/MIN/1.73 M^2
EST. GFR  (NON AFRICAN AMERICAN): >60 ML/MIN/1.73 M^2
ESTIMATED AVG GLUCOSE: 174 MG/DL (ref 68–131)
GLUCOSE SERPL-MCNC: 332 MG/DL (ref 70–110)
HBA1C MFR BLD HPLC: 7.7 % (ref 4–5.6)
HDLC SERPL-MCNC: 35 MG/DL (ref 40–75)
HDLC SERPL: 14.6 % (ref 20–50)
LDLC SERPL CALC-MCNC: ABNORMAL MG/DL (ref 63–159)
NONHDLC SERPL-MCNC: 204 MG/DL
POTASSIUM SERPL-SCNC: 3.5 MMOL/L (ref 3.5–5.1)
PROT SERPL-MCNC: 7.2 G/DL (ref 6–8.4)
SODIUM SERPL-SCNC: 137 MMOL/L (ref 136–145)
TRIGL SERPL-MCNC: 454 MG/DL (ref 30–150)

## 2019-04-11 PROCEDURE — 36415 COLL VENOUS BLD VENIPUNCTURE: CPT

## 2019-04-11 PROCEDURE — 80061 LIPID PANEL: CPT

## 2019-04-11 PROCEDURE — 99214 OFFICE O/P EST MOD 30 MIN: CPT | Mod: S$GLB,,, | Performed by: NURSE PRACTITIONER

## 2019-04-11 PROCEDURE — 83036 HEMOGLOBIN GLYCOSYLATED A1C: CPT

## 2019-04-11 PROCEDURE — 99214 PR OFFICE/OUTPT VISIT, EST, LEVL IV, 30-39 MIN: ICD-10-PCS | Mod: S$GLB,,, | Performed by: NURSE PRACTITIONER

## 2019-04-11 PROCEDURE — 80053 COMPREHEN METABOLIC PANEL: CPT

## 2019-04-11 PROCEDURE — 99999 PR PBB SHADOW E&M-EST. PATIENT-LVL III: CPT | Mod: PBBFAC,,, | Performed by: NURSE PRACTITIONER

## 2019-04-11 PROCEDURE — 99999 PR PBB SHADOW E&M-EST. PATIENT-LVL III: ICD-10-PCS | Mod: PBBFAC,,, | Performed by: NURSE PRACTITIONER

## 2019-04-11 RX ORDER — ATORVASTATIN CALCIUM 20 MG/1
20 TABLET, FILM COATED ORAL DAILY
Qty: 90 TABLET | Refills: 3 | Status: SHIPPED | OUTPATIENT
Start: 2019-04-11 | End: 2019-11-20

## 2019-04-11 NOTE — ASSESSMENT & PLAN NOTE
-- Labs today  -- Reviewed goals of therapy are to get the best control we can without hypoglycemia  Medication changes:   Continue: Invokana  Start: Trulicity 0.75mg weekly for 2 weeks & then increase to 1.5 mg weekly indefinitely.    -- Discussed compliance in depth   -- Advised frequent self blood glucose monitoring.  Patient encouraged to document glucose results and bring them to every clinic visit    -- Hypoglycemia precautions discussed. Instructed on precautions before driving.    -- Call for Bg repeatedly < 90 or > 180.   -- Close adherence to lifestyle changes recommended.   -- Periodic follow ups for eye evaluations, foot care and dental care suggested.  -- Eye and foot exam upcoming per patient.

## 2019-04-11 NOTE — TELEPHONE ENCOUNTER
----- Message from Sarah Ernst sent at 4/11/2019  2:24 PM CDT -----  Contact: [facility] / [place] /  [place]; [phone]    Received two sigs for Trulicity  1 for 1.5mgs and another one .75mgs.    Which one is pt. Supposed to be taking.   Has questions.

## 2019-04-12 ENCOUNTER — TELEPHONE (OUTPATIENT)
Dept: ENDOCRINOLOGY | Facility: CLINIC | Age: 24
End: 2019-04-12

## 2019-04-12 ENCOUNTER — PATIENT MESSAGE (OUTPATIENT)
Dept: ENDOCRINOLOGY | Facility: CLINIC | Age: 24
End: 2019-04-12

## 2019-04-12 NOTE — TELEPHONE ENCOUNTER
Called insurance company to request to fax  PA form fax over to clinic for Trulicity .  Once we receive PA form will fill out and fax back to [employer] to reviewed.

## 2019-04-16 ENCOUNTER — TELEPHONE (OUTPATIENT)
Dept: ENDOCRINOLOGY | Facility: CLINIC | Age: 24
End: 2019-04-16

## 2019-05-04 DIAGNOSIS — F41.1 GAD (GENERALIZED ANXIETY DISORDER): ICD-10-CM

## 2019-05-04 DIAGNOSIS — F33.42 RECURRENT MAJOR DEPRESSIVE DISORDER, IN FULL REMISSION: ICD-10-CM

## 2019-05-04 RX ORDER — BUPROPION HYDROCHLORIDE 150 MG/1
150 TABLET ORAL DAILY
Qty: 30 TABLET | Refills: 0 | Status: SHIPPED | OUTPATIENT
Start: 2019-05-04 | End: 2019-05-07 | Stop reason: SDUPTHER

## 2019-05-07 DIAGNOSIS — F33.42 RECURRENT MAJOR DEPRESSIVE DISORDER, IN FULL REMISSION: ICD-10-CM

## 2019-05-07 DIAGNOSIS — F41.1 GAD (GENERALIZED ANXIETY DISORDER): ICD-10-CM

## 2019-05-08 RX ORDER — BUPROPION HYDROCHLORIDE 150 MG/1
150 TABLET ORAL DAILY
Qty: 30 TABLET | Refills: 0 | Status: SHIPPED | OUTPATIENT
Start: 2019-05-08 | End: 2019-06-20 | Stop reason: SDUPTHER

## 2019-06-12 RX ORDER — CANAGLIFLOZIN 300 MG/1
TABLET, FILM COATED ORAL
Qty: 30 TABLET | Refills: 5 | Status: SHIPPED | OUTPATIENT
Start: 2019-06-12 | End: 2019-11-20

## 2019-06-20 DIAGNOSIS — F33.42 RECURRENT MAJOR DEPRESSIVE DISORDER, IN FULL REMISSION: ICD-10-CM

## 2019-06-20 DIAGNOSIS — F41.1 GAD (GENERALIZED ANXIETY DISORDER): ICD-10-CM

## 2019-06-20 RX ORDER — BUPROPION HYDROCHLORIDE 150 MG/1
150 TABLET ORAL DAILY
Qty: 30 TABLET | Refills: 0 | Status: SHIPPED | OUTPATIENT
Start: 2019-06-20 | End: 2019-08-02 | Stop reason: SDUPTHER

## 2019-07-11 DIAGNOSIS — F41.1 GAD (GENERALIZED ANXIETY DISORDER): ICD-10-CM

## 2019-07-11 DIAGNOSIS — F33.42 RECURRENT MAJOR DEPRESSIVE DISORDER, IN FULL REMISSION: ICD-10-CM

## 2019-07-11 RX ORDER — FLUOXETINE 10 MG/1
10 CAPSULE ORAL DAILY
Qty: 90 CAPSULE | Refills: 1 | Status: SHIPPED | OUTPATIENT
Start: 2019-07-11 | End: 2019-11-19 | Stop reason: SDUPTHER

## 2019-08-02 DIAGNOSIS — F41.1 GAD (GENERALIZED ANXIETY DISORDER): ICD-10-CM

## 2019-08-02 DIAGNOSIS — F33.42 RECURRENT MAJOR DEPRESSIVE DISORDER, IN FULL REMISSION: ICD-10-CM

## 2019-08-02 RX ORDER — BUPROPION HYDROCHLORIDE 150 MG/1
150 TABLET ORAL DAILY
Qty: 30 TABLET | Refills: 5 | Status: SHIPPED | OUTPATIENT
Start: 2019-08-02 | End: 2019-11-19 | Stop reason: SDUPTHER

## 2019-11-18 ENCOUNTER — PATIENT MESSAGE (OUTPATIENT)
Dept: ENDOCRINOLOGY | Facility: CLINIC | Age: 24
End: 2019-11-18

## 2019-11-19 DIAGNOSIS — F41.1 GAD (GENERALIZED ANXIETY DISORDER): ICD-10-CM

## 2019-11-19 DIAGNOSIS — F33.42 RECURRENT MAJOR DEPRESSIVE DISORDER, IN FULL REMISSION: ICD-10-CM

## 2019-11-19 RX ORDER — FLUOXETINE 10 MG/1
10 CAPSULE ORAL DAILY
Qty: 90 CAPSULE | Refills: 1 | Status: SHIPPED | OUTPATIENT
Start: 2019-11-19 | End: 2020-01-09 | Stop reason: SDUPTHER

## 2019-11-19 RX ORDER — BUPROPION HYDROCHLORIDE 150 MG/1
150 TABLET ORAL DAILY
Qty: 30 TABLET | Refills: 5 | Status: SHIPPED | OUTPATIENT
Start: 2019-11-19 | End: 2019-11-20

## 2019-11-20 ENCOUNTER — OFFICE VISIT (OUTPATIENT)
Dept: ENDOCRINOLOGY | Facility: CLINIC | Age: 24
End: 2019-11-20
Payer: COMMERCIAL

## 2019-11-20 VITALS
DIASTOLIC BLOOD PRESSURE: 78 MMHG | WEIGHT: 267.75 LBS | SYSTOLIC BLOOD PRESSURE: 134 MMHG | HEART RATE: 76 BPM | HEIGHT: 73 IN | BODY MASS INDEX: 35.49 KG/M2

## 2019-11-20 DIAGNOSIS — E11.65 TYPE 2 DIABETES MELLITUS WITH HYPERGLYCEMIA, WITHOUT LONG-TERM CURRENT USE OF INSULIN: Primary | ICD-10-CM

## 2019-11-20 DIAGNOSIS — E78.5 DYSLIPIDEMIA: ICD-10-CM

## 2019-11-20 DIAGNOSIS — E66.9 OBESITY (BMI 30-39.9): ICD-10-CM

## 2019-11-20 PROCEDURE — 99999 PR PBB SHADOW E&M-EST. PATIENT-LVL III: ICD-10-PCS | Mod: PBBFAC,,, | Performed by: INTERNAL MEDICINE

## 2019-11-20 PROCEDURE — 99214 PR OFFICE/OUTPT VISIT, EST, LEVL IV, 30-39 MIN: ICD-10-PCS | Mod: S$GLB,,, | Performed by: INTERNAL MEDICINE

## 2019-11-20 PROCEDURE — 99214 OFFICE O/P EST MOD 30 MIN: CPT | Mod: S$GLB,,, | Performed by: INTERNAL MEDICINE

## 2019-11-20 PROCEDURE — 99999 PR PBB SHADOW E&M-EST. PATIENT-LVL III: CPT | Mod: PBBFAC,,, | Performed by: INTERNAL MEDICINE

## 2019-11-20 RX ORDER — LISINOPRIL 10 MG/1
10 TABLET ORAL DAILY
Qty: 90 TABLET | Refills: 3 | Status: SHIPPED | OUTPATIENT
Start: 2019-11-20 | End: 2020-11-04

## 2019-11-20 NOTE — ASSESSMENT & PLAN NOTE
Elevated TG in the past.  Previously on atorvastatin but ran out a couple mo ago.  Will check baseline lipids then start statin like crestor.

## 2019-11-20 NOTE — ASSESSMENT & PLAN NOTE
Last HbA1c above goal and could not afford to add on trulicity.  High copays for brand meds with new insurance.  Change invokana to jardiance w/ copay card. He will let me know if he cannot afford this.  Starting lisinopril 2/2 hx of microalbuminuria and SBP in 130s with BMP and other labs in 2 wks.  If HbA1c remains in 7's will have him try jardiance and intensive lifestyle changes for 3 months and reassess.  If HbA1c markedly high will need to add an agent however cost is an issue.  Would like to avoid insulin to prevent weight gain.

## 2019-11-20 NOTE — ASSESSMENT & PLAN NOTE
Encouraged improved diet and exercise.  Patient declined appointment with DM education.  Has been stressed w/ wedding coming up but plans to improve lifestyle after this weekend.

## 2019-11-20 NOTE — PATIENT INSTRUCTIONS
Please see your eye doctor for diabetes exam.    Start lisinopril 10 mg daily    Start Jardiance 10 mg daily instead of invokana    Fasting bloodwork in 2 weeks    We will need to start cholesterol medication after that    Try to improve diet and exercise more over the next 3 months so we can get your HbA1c <7.0

## 2019-11-20 NOTE — PROGRESS NOTES
ENDOCRINE CLINIC FOLLOW UP    11/20/2019     Last seen 4/11/19, first visit w/ me    Patient ID: Roberto Carlos Souza is a 24 y.o. male.    Chief Complaint:  Diabetes    History of Present Illness    With regards to the diabetes:   Has been very very busy/stressed. Getting  Saturday.  Changed from parent's insurance to his own but has to pay 25% out of pocket for brand meds.      Since last visit did not start prescribed trulicity 2/2 high copay.  Will have $300 copay for invokana now    Has been having some blood in stools, no abd pain.      Diabetes Hx:  Diagnosed w/ DM: Type 2 DM in 1/2017  Complications: + nephropathy, has not had eye exam  Current meds: compliant with   Invokana 300 mg  Previous meds:   metformin XR d/c'd side effects  Hypoglycemia: denies  Home glucose checks: not checking  Diet/Exercise:    Not following healthy eating, has room to improve diet. Plans to improve after wedding.   Not interested in DM education now   running does go to gym once a week - plans to increase  Last A1c:   Lab Results   Component Value Date    HGBA1C 7.7 (H) 04/11/2019     microalbumin: not on ACEI/ARB  BP: 134/78     Lab Results   Component Value Date    LABMICR 22.0 04/11/2019    CREATRANDUR 35.0 04/11/2019    MICALBCREAT 62.9 (H) 04/11/2019     Lipids: on atorvastatin 20 mg daily (ran out 1-2 mo ago)  Lab Results   Component Value Date    CHOL 239 (H) 04/11/2019    TRIG 454 (H) 04/11/2019    HDL 35 (L) 04/11/2019    LDLCALC Invalid, Trig>400.0 04/11/2019    CHOLHDL 14.6 (L) 04/11/2019     TSH:  Lab Results   Component Value Date    TSH 2.239 01/11/2017     Eye: has not had eye exam since diagnosed with DM.  Offered referral but he will see outside ophtho   Last eye exam: Most Recent Eye Exam Date: Not Found)  Foot: normal exam 11/20/19   Last foot exam: : 09/01/2017)    Education - last visit: none    Review of Systems   Constitutional: Negative for unexpected weight change.   Eyes: Negative for visual  "disturbance.   Respiratory: Negative for shortness of breath.    Cardiovascular: Negative for chest pain.   Gastrointestinal: Negative for abdominal pain. +blood in stools  Skin: Negative for wound.     Current Outpatient Medications:     buPROPion (WELLBUTRIN XL) 150 MG TB24 tablet, Take 1 tablet (150 mg total) by mouth once daily., Disp: 90 tablet, Rfl: 2    buPROPion (WELLBUTRIN XL) 150 MG TB24 tablet, Take 1 tablet (150 mg total) by mouth once daily., Disp: 30 tablet, Rfl: 5    FLUoxetine 10 MG capsule, Take 1 capsule (10 mg total) by mouth once daily., Disp: 90 capsule, Rfl: 1    atorvastatin (LIPITOR) 20 MG tablet, Take 1 tablet (20 mg total) by mouth once daily. (Patient not taking: Reported on 11/20/2019), Disp: 90 tablet, Rfl: 3    blood sugar diagnostic (CONTOUR NEXT STRIPS) Strp, 1 each by Misc.(Non-Drug; Combo Route) route once daily. (Patient not taking: Reported on 11/20/2019), Disp: 30 each, Rfl: 11    canagliflozin (INVOKANA) 300 mg Tab tablet, Take 1 tablet (300 mg total) by mouth once daily. (Patient not taking: Reported on 11/20/2019), Disp: 90 tablet, Rfl: 3    dulaglutide (TRULICITY) 1.5 mg/0.5 mL PnIj, Inject 1.5 mg into the skin once a week. (Patient not taking: Reported on 11/20/2019), Disp: 12 Syringe, Rfl: 3    INVOKANA 300 mg Tab tablet, TAKE 1 TABLET BY MOUTH EVERY DAY (Patient not taking: Reported on 11/20/2019), Disp: 30 tablet, Rfl: 5    lancets (MICROLET LANCET) Misc, 1 each by Misc.(Non-Drug; Combo Route) route 3 (three) times daily. (Patient not taking: Reported on 11/20/2019), Disp: 100 each, Rfl: 11    Objective:   Physical Exam   Neck: No thyromegaly present.   Cardiovascular: Normal rate.   No edema present   Pulmonary/Chest: Effort normal.   Abdominal: Soft.   Vitals reviewed.    Vitals:    11/20/19 1344   BP: 134/78   Pulse: 76   Weight: 121.5 kg (267 lb 12 oz)   Height: 6' 1" (1.854 m)     Wt Readings from Last 3 Encounters:   11/20/19 121.5 kg (267 lb 12 oz) "   04/11/19 121.3 kg (267 lb 6.7 oz)   11/06/18 121.4 kg (267 lb 11.2 oz)       Diabetes Foot Exam:   Protective Sensation (w/ 10 gram monofilament):  Right: Intact  Left: Intact    Visual Inspection:  Normal -  Bilateral    Pedal Pulses:   Right: Present  Left: Present    Posterior tibialis:   Right:Present  Left: Present    Lab Review:   Lab Results   Component Value Date     04/11/2019    K 3.5 04/11/2019     04/11/2019    CO2 23 04/11/2019     (H) 04/11/2019    BUN 14 04/11/2019    CREATININE 0.9 04/11/2019    CALCIUM 9.6 04/11/2019    PROT 7.2 04/11/2019    ALBUMIN 4.0 04/11/2019    BILITOT 0.7 04/11/2019    ALKPHOS 69 04/11/2019    AST 26 04/11/2019    ALT 50 (H) 04/11/2019    ANIONGAP 11 04/11/2019    ESTGFRAFRICA >60.0 04/11/2019    EGFRNONAA >60.0 04/11/2019    TSH 2.239 01/11/2017   See HPI for other labs reviewed today    Assessment and Plan     Problem List Items Addressed This Visit        Cardiac/Vascular    Dyslipidemia     Elevated TG in the past.  Previously on atorvastatin but ran out a couple mo ago.  Will check baseline lipids then start statin like crestor.           Relevant Orders    Lipid panel       Endocrine    Type 2 diabetes mellitus with hyperglycemia, without long-term current use of insulin - Primary     Last HbA1c above goal and could not afford to add on trulicity.  High copays for brand meds with new insurance.  Change invokana to [employer] w/ copay card. He will let me know if he cannot afford this.  Starting lisinopril 2/2 hx of microalbuminuria and SBP in 130s with BMP and other labs in 2 wks.  If HbA1c remains in 7's will have him try jardiance and intensive lifestyle changes for 3 months and reassess.  If HbA1c markedly high will need to add an agent however cost is an issue.  Would like to avoid insulin to prevent weight gain.            Relevant Medications    empagliflozin (JARDIANCE) 10 mg Tab    lisinopril 10 MG tablet    Other Relevant Orders     Comprehensive metabolic panel    Hemoglobin A1c    Lipid panel    Obesity (BMI 30-39.9)     Encouraged improved diet and exercise.  Patient declined appointment with DM education.  Has been stressed w/ wedding coming up but plans to improve lifestyle after this weekend.                 RTC in 3 mo    James Tsang MD

## 2020-01-09 ENCOUNTER — OFFICE VISIT (OUTPATIENT)
Dept: PSYCHIATRY | Facility: CLINIC | Age: 25
End: 2020-01-09
Payer: COMMERCIAL

## 2020-01-09 VITALS
HEART RATE: 87 BPM | WEIGHT: 270.06 LBS | BODY MASS INDEX: 35.79 KG/M2 | SYSTOLIC BLOOD PRESSURE: 147 MMHG | HEIGHT: 73 IN | DIASTOLIC BLOOD PRESSURE: 79 MMHG

## 2020-01-09 DIAGNOSIS — F41.1 GAD (GENERALIZED ANXIETY DISORDER): ICD-10-CM

## 2020-01-09 DIAGNOSIS — F33.42 RECURRENT MAJOR DEPRESSIVE DISORDER, IN FULL REMISSION: ICD-10-CM

## 2020-01-09 PROCEDURE — 99213 OFFICE O/P EST LOW 20 MIN: CPT | Mod: S$GLB,,, | Performed by: NURSE PRACTITIONER

## 2020-01-09 PROCEDURE — 99999 PR PBB SHADOW E&M-EST. PATIENT-LVL III: CPT | Mod: PBBFAC,,, | Performed by: NURSE PRACTITIONER

## 2020-01-09 PROCEDURE — 99213 PR OFFICE/OUTPT VISIT, EST, LEVL III, 20-29 MIN: ICD-10-PCS | Mod: S$GLB,,, | Performed by: NURSE PRACTITIONER

## 2020-01-09 PROCEDURE — 99999 PR PBB SHADOW E&M-EST. PATIENT-LVL III: ICD-10-PCS | Mod: PBBFAC,,, | Performed by: NURSE PRACTITIONER

## 2020-01-09 RX ORDER — BUPROPION HYDROCHLORIDE 150 MG/1
150 TABLET ORAL DAILY
Qty: 90 TABLET | Refills: 3 | Status: SHIPPED | OUTPATIENT
Start: 2020-01-09 | End: 2021-01-14

## 2020-01-09 RX ORDER — FLUOXETINE 10 MG/1
10 CAPSULE ORAL DAILY
Qty: 90 CAPSULE | Refills: 3 | Status: SHIPPED | OUTPATIENT
Start: 2020-01-09 | End: 2021-01-27

## 2020-01-09 NOTE — PROGRESS NOTES
Outpatient Psychiatry Follow-Up Visit (MD/NP)    1/9/2020    Clinical Status of Patient:  Outpatient (Ambulatory)    Chief Complaint:  Roberto Carlos Souza is a 24 y.o. male who presents today for follow-up of depression and anxiety.  Met with patient.      Last visit was on 4.14/18 Chart reveiwed.    Interval History and Content of Current Session:  Psychiatric Medication Profile  · Continue Wellbutrin  mg daily  · Continue Prozac 10 mg daily    Pt presents that he is doing well.  Affect bright with euthymic mood.  Thought processes are clear and organized.  Compliant with medications and denies side effects. Denies any situational stressors.  Denies SI/HI/AVH.      Psychotherapy:  · Target symptoms: depression, anxiety   · Why chosen therapy is appropriate versus another modality: relevant to diagnosis, evidence based practice  · Outcome monitoring methods: self-report, observation  · Therapeutic intervention type: supportive psychotherapy  · Topics discussed/themes: work stress  · The patient's response to the intervention is accepting. The patient's progress toward treatment goals is good.   · Duration of intervention: 12 minutes.    Review of Systems   GENERAL: No weight gain or loss   SKIN: No rashes or lacerations   HEAD: No headaches   EYES: No exophthalmos, jaundice or blindness   EARS: No dizziness, tinnitus or hearing loss   NOSE: No changes in smell   MOUTH & THROAT: No dyskinetic movements or obvious goiter   CHEST: No shortness of breath, hyperventilation or cough   CARDIOVASCULAR: No tachycardia or chest pain   ABDOMEN: No nausea, vomiting, pain, constipation or diarrhea   URINARY: No frequency, dysuria or sexual dysfunction   ENDOCRINE: No polydipsia, polyuria   MUSCULOSKELETAL: No pain or stiffness of the joints   NEUROLOGIC: No weakness, sensory changes, seizures, confusion, memory loss, tremor or other abnormal movements      Psychiatric Review Of Systems:  sleep: no  appetite changes: no  weight  "changes: yes, weight loss  energy/anergy: no  interest/pleasure/anhedonia: no  somatic symptoms: no  libido: not assessed  anxiety/panic: no      Past Medical, Family and Social History: The patient's past medical, family and social history have been reviewed and updated as appropriate within the electronic medical record - see encounter notes.    Compliance: yes    Side effects: None    Risk Parameters:  Patient reports no suicidal ideation  Patient reports no homicidal ideation  Patient reports no self-injurious behavior  Patient reports no violent behavior    Exam (detailed: at least 9 elements; comprehensive: all 15 elements)   Constitutional  Vitals:  Most recent vital signs, dated less than 90 days prior to this appointment, were not reviewed.   Vitals:    01/09/20 0829   BP: (!) 147/79   Pulse: 87   Weight: 122.5 kg (270 lb 1 oz)   Height: 6' 1" (1.854 m)      General:  unremarkable, age appropriate, well nourished, bearded, casually dressed     Musculoskeletal  Muscle Strength/Tone:  no tremor, no tic   Gait & Station:  non-ataxic     Psychiatric  Speech:  no latency; no press   Mood & Affect:  happy  congruent and appropriate   Thought Process:  normal and logical   Associations:  intact   Thought Content:  normal, no suicidality, no homicidality, delusions, or paranoia   Insight:  intact   Judgement: behavior is adequate to circumstances   Orientation:  grossly intact   Memory: intact for content of interview   Language: grossly intact   Attention Span & Concentration:  able to focus   Fund of Knowledge:  intact and appropriate to age and level of education     Assessment and Diagnosis   Status/Progress: Based on the examination today, the patient's problem(s) is/are well controlled.  New problems have not been presented today.   Co-morbidities, Diagnostic uncertainty and Lack of compliance are not complicating management of the primary condition.  There are no active rule-out diagnoses for this patient at " this time.     General Impression:   .Diagnoses and all orders for this visit:    Recurrent major depressive disorder, in full remission  -     buPROPion (WELLBUTRIN XL) 150 MG TB24 tablet; Take 1 tablet (150 mg total) by mouth once daily.  -     FLUoxetine 10 MG capsule; Take 1 capsule (10 mg total) by mouth once daily.    MARCOS (generalized anxiety disorder)  -     buPROPion (WELLBUTRIN XL) 150 MG TB24 tablet; Take 1 tablet (150 mg total) by mouth once daily.  -     FLUoxetine 10 MG capsule; Take 1 capsule (10 mg total) by mouth once daily.      Intervention/Counseling/Treatment Plan   · Medication Management: Continue current medications. The risks and benefits of medication were discussed with the patient.   · Continue Wellbutrin  mg daily  · Continue Prozac 10 mg daily  · Ordered 90 day refills. Okay to refill outside appointments.     Return to Clinic: 1 year     Risks, benefits, side effects and alternative treatments discussed with patient. Patient agrees with the current plan as documented.  Encouraged Patient to keep future appointments.  Take medications as prescribed and abstain from substance abuse.  Pt to present to ED for thoughts to harm himself or others

## 2020-02-20 ENCOUNTER — OFFICE VISIT (OUTPATIENT)
Dept: ENDOCRINOLOGY | Facility: CLINIC | Age: 25
End: 2020-02-20
Payer: COMMERCIAL

## 2020-02-20 VITALS
HEART RATE: 100 BPM | DIASTOLIC BLOOD PRESSURE: 66 MMHG | SYSTOLIC BLOOD PRESSURE: 100 MMHG | BODY MASS INDEX: 34.66 KG/M2 | WEIGHT: 270.06 LBS | HEIGHT: 74 IN

## 2020-02-20 DIAGNOSIS — E11.65 TYPE 2 DIABETES MELLITUS WITH HYPERGLYCEMIA, WITHOUT LONG-TERM CURRENT USE OF INSULIN: Primary | ICD-10-CM

## 2020-02-20 DIAGNOSIS — E66.9 OBESITY (BMI 30-39.9): ICD-10-CM

## 2020-02-20 DIAGNOSIS — E78.5 DYSLIPIDEMIA: ICD-10-CM

## 2020-02-20 PROCEDURE — 99999 PR PBB SHADOW E&M-EST. PATIENT-LVL III: ICD-10-PCS | Mod: PBBFAC,,, | Performed by: INTERNAL MEDICINE

## 2020-02-20 PROCEDURE — 99999 PR PBB SHADOW E&M-EST. PATIENT-LVL III: CPT | Mod: PBBFAC,,, | Performed by: INTERNAL MEDICINE

## 2020-02-20 PROCEDURE — 99214 OFFICE O/P EST MOD 30 MIN: CPT | Mod: S$GLB,,, | Performed by: INTERNAL MEDICINE

## 2020-02-20 PROCEDURE — 99214 PR OFFICE/OUTPT VISIT, EST, LEVL IV, 30-39 MIN: ICD-10-PCS | Mod: S$GLB,,, | Performed by: INTERNAL MEDICINE

## 2020-02-20 RX ORDER — METFORMIN HYDROCHLORIDE 500 MG/1
1000 TABLET, EXTENDED RELEASE ORAL 2 TIMES DAILY WITH MEALS
Qty: 360 TABLET | Refills: 3 | Status: SHIPPED | OUTPATIENT
Start: 2020-02-20 | End: 2021-02-01

## 2020-02-20 RX ORDER — METFORMIN HYDROCHLORIDE 500 MG/1
1000 TABLET, EXTENDED RELEASE ORAL 2 TIMES DAILY WITH MEALS
COMMUNITY
End: 2020-02-20

## 2020-02-20 NOTE — PROGRESS NOTES
ENDOCRINE CLINIC FOLLOW UP    02/20/2020     The patient's last visit with me was on 11/20/2019.     Patient ID: Roberto Carlos Souza is a 24 y.o. male.    Chief Complaint:  No chief complaint on file.    History of Present Illness    With regards to the diabetes:  Is moving to [facility] thus will be transferring care [place].  Has been busy w/ wedding and moving so has not been managing DM well.  Did not get ordered labs drawn    Was not able to get trulicity, jardiance, invokana due to high copay.      Blood in stools resolved.      Diabetes Hx:  Diagnosed w/ DM: Type 2 DM in 1/2017  Complications: + nephropathy, has not had eye exam  Current meds: compliant with   Metformin XR 1000 mg BID  Previous meds:   metformin XR d/c'd side effects   Invokana 300 mg - high copay  Hypoglycemia: denies  Home glucose checks: not checking  Diet/Exercise:    Not eating healthy b/c he is moving   Not interested in DM education now   Walking dog for exercise  Last A1c:   Lab Results   Component Value Date    HGBA1C 7.7 (H) 04/11/2019     microalbumin: on lisinopril 10 mg daily    denies CP/SOB, lightheadedness, HA/vision change  BP Readings from Last 3 Encounters:   02/20/20 100/66   11/20/19 134/78   04/11/19 (!) 144/90      Lab Results   Component Value Date    LABMICR 22.0 04/11/2019    CREATRANDUR 35.0 04/11/2019    MICALBCREAT 62.9 (H) 04/11/2019     Lipids: previously on atorvastatin    Lab Results   Component Value Date    CHOL 239 (H) 04/11/2019    TRIG 454 (H) 04/11/2019    HDL 35 (L) 04/11/2019    LDLCALC Invalid, Trig>400.0 04/11/2019    CHOLHDL 14.6 (L) 04/11/2019     TSH:  Lab Results   Component Value Date    TSH 2.239 01/11/2017     Eye: has not had eye exam since diagnosed with DM.  Offered referral but he will see outside ophtho   Last eye exam: Most Recent Eye Exam Date: Not Found)  Foot: normal exam 11/20/19   Last foot exam: : 11/20/2019)    Education - last visit: none    Review of Systems   Constitutional:  "Negative for unexpected weight change.   Eyes: Negative for visual disturbance.   Respiratory: Negative for shortness of breath.    Cardiovascular: Negative for chest pain.       Current Outpatient Medications:     blood sugar diagnostic (CONTOUR NEXT STRIPS) Strp, 1 each by Misc.(Non-Drug; Combo Route) route once daily., Disp: 30 each, Rfl: 11    buPROPion (WELLBUTRIN XL) 150 MG TB24 tablet, Take 1 tablet (150 mg total) by mouth once daily., Disp: 90 tablet, Rfl: 3    FLUoxetine 10 MG capsule, Take 1 capsule (10 mg total) by mouth once daily., Disp: 90 capsule, Rfl: 3    lancets (MICROLET LANCET) Misc, 1 each by Misc.(Non-Drug; Combo Route) route 3 (three) times daily., Disp: 100 each, Rfl: 11    lisinopril 10 MG tablet, Take 1 tablet (10 mg total) by mouth once daily., Disp: 90 tablet, Rfl: 3    metFORMIN (GLUCOPHAGE-XR) 500 MG XR 24hr tablet, Take 2 tablets (1,000 mg total) by mouth 2 (two) times daily with meals., Disp: 360 tablet, Rfl: 3    Objective:   Physical Exam   Neck: No thyromegaly present.   Cardiovascular: Normal rate.   No edema present   Pulmonary/Chest: Effort normal.   Abdominal: Soft.   Vitals reviewed.    Vitals:    02/20/20 1443   BP: 100/66   Pulse: 100   Weight: 122.5 kg (270 lb 1 oz)   Height: 6' 2" (1.88 m)     Wt Readings from Last 3 Encounters:   11/20/19 121.5 kg (267 lb 12 oz)   04/11/19 121.3 kg (267 lb 6.7 oz)   11/06/18 121.4 kg (267 lb 11.2 oz)         Lab Review:   Lab Results   Component Value Date     04/11/2019    K 3.5 04/11/2019     04/11/2019    CO2 23 04/11/2019     (H) 04/11/2019    BUN 14 04/11/2019    CREATININE 0.9 04/11/2019    CALCIUM 9.6 04/11/2019    PROT 7.2 04/11/2019    ALBUMIN 4.0 04/11/2019    BILITOT 0.7 04/11/2019    ALKPHOS 69 04/11/2019    AST 26 04/11/2019    ALT 50 (H) 04/11/2019    ANIONGAP 11 04/11/2019    ESTGFRAFRICA >60.0 04/11/2019    EGFRNONAA >60.0 04/11/2019    TSH 2.239 01/11/2017   See HPI for other labs reviewed " today    Assessment and Plan     Problem List Items Addressed This Visit        1 - High    Type 2 diabetes mellitus with hyperglycemia, without long-term current use of insulin - Primary     Has not been able to afford copay on several meds prescribed in the past (copay card didn't work).  We discussed that I have limited data today and depending on labs and blood sugar log he may need to add an additional affordable agent like insulin or sulfonylurea.  Will get labs today and he will establish care w/ PCP at [facility].  As he is only on oral agents it would be reasonable for him to be managed by PCP however he may also schedule with [facility] endocrine.           Relevant Medications    metFORMIN (GLUCOPHAGE-XR) 500 MG XR 24hr tablet    Other Relevant Orders    Ambulatory referral/consult to Internal Medicine       2     Dyslipidemia     TG persistently elevated, will check fasting lipids to evaluate LDL.  Will likely need to start statin or fibrate            3     Obesity (BMI 30-39.9)     Advised lifestyle modifications.               RTC prn, will schedule f/u at [facility]    James Tsang MD

## 2020-02-23 NOTE — ASSESSMENT & PLAN NOTE
TG persistently elevated, will check fasting lipids to evaluate LDL.  Will likely need to start statin or fibrate

## 2020-02-23 NOTE — ASSESSMENT & PLAN NOTE
Has not been able to afford copay on several meds prescribed in the past (copay card didn't work).  We discussed that I have limited data today and depending on labs and blood sugar log he may need to add an additional affordable agent like insulin or sulfonylurea.  Will get labs today and he will establish care w/ PCP at [facility].  As he is only on oral agents it would be reasonable for him to be managed by PCP however he may also schedule with [facility] endocrine.

## 2020-03-07 ENCOUNTER — LAB VISIT (OUTPATIENT)
Dept: LAB | Facility: HOSPITAL | Age: 25
End: 2020-03-07
Attending: INTERNAL MEDICINE
Payer: COMMERCIAL

## 2020-03-07 DIAGNOSIS — E11.65 TYPE 2 DIABETES MELLITUS WITH HYPERGLYCEMIA, WITHOUT LONG-TERM CURRENT USE OF INSULIN: ICD-10-CM

## 2020-03-07 DIAGNOSIS — E78.5 DYSLIPIDEMIA: ICD-10-CM

## 2020-03-07 LAB
ALBUMIN SERPL BCP-MCNC: 3.8 G/DL (ref 3.5–5.2)
ALP SERPL-CCNC: 64 U/L (ref 55–135)
ALT SERPL W/O P-5'-P-CCNC: 101 U/L (ref 10–44)
ANION GAP SERPL CALC-SCNC: 11 MMOL/L (ref 8–16)
AST SERPL-CCNC: 56 U/L (ref 10–40)
BILIRUB SERPL-MCNC: 0.9 MG/DL (ref 0.1–1)
BUN SERPL-MCNC: 10 MG/DL (ref 6–20)
CALCIUM SERPL-MCNC: 9.6 MG/DL (ref 8.7–10.5)
CHLORIDE SERPL-SCNC: 103 MMOL/L (ref 95–110)
CHOLEST SERPL-MCNC: 198 MG/DL (ref 120–199)
CHOLEST/HDLC SERPL: 5.4 {RATIO} (ref 2–5)
CO2 SERPL-SCNC: 24 MMOL/L (ref 23–29)
CREAT SERPL-MCNC: 0.8 MG/DL (ref 0.5–1.4)
EST. GFR  (AFRICAN AMERICAN): >60 ML/MIN/1.73 M^2
EST. GFR  (NON AFRICAN AMERICAN): >60 ML/MIN/1.73 M^2
ESTIMATED AVG GLUCOSE: 186 MG/DL (ref 68–131)
GLUCOSE SERPL-MCNC: 163 MG/DL (ref 70–110)
HBA1C MFR BLD HPLC: 8.1 % (ref 4–5.6)
HDLC SERPL-MCNC: 37 MG/DL (ref 40–75)
HDLC SERPL: 18.7 % (ref 20–50)
LDLC SERPL CALC-MCNC: 126.2 MG/DL (ref 63–159)
NONHDLC SERPL-MCNC: 161 MG/DL
POTASSIUM SERPL-SCNC: 4.3 MMOL/L (ref 3.5–5.1)
PROT SERPL-MCNC: 7.2 G/DL (ref 6–8.4)
SODIUM SERPL-SCNC: 138 MMOL/L (ref 136–145)
TRIGL SERPL-MCNC: 174 MG/DL (ref 30–150)

## 2020-03-07 PROCEDURE — 80053 COMPREHEN METABOLIC PANEL: CPT

## 2020-03-07 PROCEDURE — 36415 COLL VENOUS BLD VENIPUNCTURE: CPT | Mod: PO

## 2020-03-07 PROCEDURE — 80061 LIPID PANEL: CPT

## 2020-03-07 PROCEDURE — 83036 HEMOGLOBIN GLYCOSYLATED A1C: CPT

## 2021-01-27 ENCOUNTER — PATIENT MESSAGE (OUTPATIENT)
Dept: PSYCHIATRY | Facility: CLINIC | Age: 26
End: 2021-01-27

## 2021-02-19 ENCOUNTER — OFFICE VISIT (OUTPATIENT)
Dept: PSYCHIATRY | Facility: CLINIC | Age: 26
End: 2021-02-19
Payer: COMMERCIAL

## 2021-02-19 DIAGNOSIS — F41.1 GAD (GENERALIZED ANXIETY DISORDER): ICD-10-CM

## 2021-02-19 DIAGNOSIS — F33.42 RECURRENT MAJOR DEPRESSIVE DISORDER, IN FULL REMISSION: ICD-10-CM

## 2021-02-19 PROCEDURE — 99213 OFFICE O/P EST LOW 20 MIN: CPT | Mod: 95,,, | Performed by: NURSE PRACTITIONER

## 2021-02-19 PROCEDURE — 99213 PR OFFICE/OUTPT VISIT, EST, LEVL III, 20-29 MIN: ICD-10-PCS | Mod: 95,,, | Performed by: NURSE PRACTITIONER

## 2021-02-19 RX ORDER — BUPROPION HYDROCHLORIDE 150 MG/1
150 TABLET ORAL DAILY
Qty: 90 TABLET | Refills: 3 | Status: SHIPPED | OUTPATIENT
Start: 2021-02-19 | End: 2021-10-05

## 2021-02-19 RX ORDER — FLUOXETINE 10 MG/1
10 CAPSULE ORAL DAILY
Qty: 90 CAPSULE | Refills: 3 | Status: SHIPPED | OUTPATIENT
Start: 2021-02-19 | End: 2021-05-28

## 2021-04-23 ENCOUNTER — IMMUNIZATION (OUTPATIENT)
Dept: FAMILY MEDICINE | Facility: CLINIC | Age: 26
End: 2021-04-23
Payer: COMMERCIAL

## 2021-04-23 DIAGNOSIS — Z23 NEED FOR VACCINATION: Primary | ICD-10-CM

## 2021-04-23 PROCEDURE — 91300 COVID-19, MRNA, LNP-S, PF, 30 MCG/0.3 ML DOSE VACCINE: CPT | Mod: PBBFAC | Performed by: FAMILY MEDICINE

## 2021-05-13 ENCOUNTER — OFFICE VISIT (OUTPATIENT)
Dept: FAMILY MEDICINE | Facility: CLINIC | Age: 26
End: 2021-05-13
Payer: COMMERCIAL

## 2021-05-13 VITALS
HEART RATE: 66 BPM | HEIGHT: 74 IN | SYSTOLIC BLOOD PRESSURE: 126 MMHG | DIASTOLIC BLOOD PRESSURE: 78 MMHG | OXYGEN SATURATION: 97 % | WEIGHT: 260.81 LBS | BODY MASS INDEX: 33.47 KG/M2

## 2021-05-13 DIAGNOSIS — K92.2 LGI BLEED: ICD-10-CM

## 2021-05-13 DIAGNOSIS — E11.9 TYPE 2 DIABETES MELLITUS WITHOUT COMPLICATION, WITHOUT LONG-TERM CURRENT USE OF INSULIN: Primary | ICD-10-CM

## 2021-05-13 DIAGNOSIS — R12 HEARTBURN: ICD-10-CM

## 2021-05-13 DIAGNOSIS — E66.9 OBESITY, UNSPECIFIED CLASSIFICATION, UNSPECIFIED OBESITY TYPE, UNSPECIFIED WHETHER SERIOUS COMORBIDITY PRESENT: ICD-10-CM

## 2021-05-13 DIAGNOSIS — Z00.00 HEALTHCARE MAINTENANCE: ICD-10-CM

## 2021-05-13 DIAGNOSIS — Z76.89 ENCOUNTER TO ESTABLISH CARE WITH NEW DOCTOR: ICD-10-CM

## 2021-05-13 DIAGNOSIS — T88.7XXA SIDE EFFECT OF MEDICATION: ICD-10-CM

## 2021-05-13 DIAGNOSIS — K52.9 CHRONIC DIARRHEA: ICD-10-CM

## 2021-05-13 DIAGNOSIS — Z31.69 INFERTILITY COUNSELING: ICD-10-CM

## 2021-05-13 PROCEDURE — 99999 PR PBB SHADOW E&M-EST. PATIENT-LVL IV: ICD-10-PCS | Mod: PBBFAC,,, | Performed by: INTERNAL MEDICINE

## 2021-05-13 PROCEDURE — 99999 PR PBB SHADOW E&M-EST. PATIENT-LVL IV: CPT | Mod: PBBFAC,,, | Performed by: INTERNAL MEDICINE

## 2021-05-13 PROCEDURE — 99204 OFFICE O/P NEW MOD 45 MIN: CPT | Mod: S$GLB,,, | Performed by: INTERNAL MEDICINE

## 2021-05-13 PROCEDURE — 3008F PR BODY MASS INDEX (BMI) DOCUMENTED: ICD-10-PCS | Mod: CPTII,S$GLB,, | Performed by: INTERNAL MEDICINE

## 2021-05-13 PROCEDURE — 3008F BODY MASS INDEX DOCD: CPT | Mod: CPTII,S$GLB,, | Performed by: INTERNAL MEDICINE

## 2021-05-13 PROCEDURE — 99204 PR OFFICE/OUTPT VISIT, NEW, LEVL IV, 45-59 MIN: ICD-10-PCS | Mod: S$GLB,,, | Performed by: INTERNAL MEDICINE

## 2021-05-13 PROCEDURE — 3046F HEMOGLOBIN A1C LEVEL >9.0%: CPT | Mod: CPTII,S$GLB,, | Performed by: INTERNAL MEDICINE

## 2021-05-13 PROCEDURE — 3046F PR MOST RECENT HEMOGLOBIN A1C LEVEL > 9.0%: ICD-10-PCS | Mod: CPTII,S$GLB,, | Performed by: INTERNAL MEDICINE

## 2021-05-13 RX ORDER — PANTOPRAZOLE SODIUM 40 MG/1
40 TABLET, DELAYED RELEASE ORAL DAILY
Qty: 30 TABLET | Refills: 2 | Status: SHIPPED | OUTPATIENT
Start: 2021-05-13 | End: 2022-04-12 | Stop reason: SDUPTHER

## 2021-05-13 RX ORDER — EMPAGLIFLOZIN 10 MG/1
10 TABLET, FILM COATED ORAL DAILY
Qty: 30 TABLET | Refills: 2 | Status: SHIPPED | OUTPATIENT
Start: 2021-05-13 | End: 2021-05-28

## 2021-05-14 ENCOUNTER — IMMUNIZATION (OUTPATIENT)
Dept: FAMILY MEDICINE | Facility: CLINIC | Age: 26
End: 2021-05-14
Payer: COMMERCIAL

## 2021-05-14 ENCOUNTER — LAB VISIT (OUTPATIENT)
Dept: LAB | Facility: HOSPITAL | Age: 26
End: 2021-05-14
Attending: INTERNAL MEDICINE
Payer: COMMERCIAL

## 2021-05-14 DIAGNOSIS — T88.7XXA SIDE EFFECT OF MEDICATION: ICD-10-CM

## 2021-05-14 DIAGNOSIS — K92.2 LGI BLEED: ICD-10-CM

## 2021-05-14 DIAGNOSIS — Z31.69 INFERTILITY COUNSELING: ICD-10-CM

## 2021-05-14 DIAGNOSIS — R12 HEARTBURN: ICD-10-CM

## 2021-05-14 DIAGNOSIS — Z00.00 HEALTHCARE MAINTENANCE: ICD-10-CM

## 2021-05-14 DIAGNOSIS — Z23 NEED FOR VACCINATION: Primary | ICD-10-CM

## 2021-05-14 DIAGNOSIS — K52.9 CHRONIC DIARRHEA: ICD-10-CM

## 2021-05-14 LAB
ALBUMIN SERPL BCP-MCNC: 3.8 G/DL (ref 3.5–5.2)
ALP SERPL-CCNC: 62 U/L (ref 55–135)
ALT SERPL W/O P-5'-P-CCNC: 53 U/L (ref 10–44)
AMYLASE SERPL-CCNC: 48 U/L (ref 20–110)
ANION GAP SERPL CALC-SCNC: 10 MMOL/L (ref 8–16)
AST SERPL-CCNC: 24 U/L (ref 10–40)
BASOPHILS # BLD AUTO: 0.05 K/UL (ref 0–0.2)
BASOPHILS NFR BLD: 0.9 % (ref 0–1.9)
BILIRUB SERPL-MCNC: 0.7 MG/DL (ref 0.1–1)
BUN SERPL-MCNC: 10 MG/DL (ref 6–20)
CALCIUM SERPL-MCNC: 9.4 MG/DL (ref 8.7–10.5)
CHLORIDE SERPL-SCNC: 105 MMOL/L (ref 95–110)
CHOLEST SERPL-MCNC: 199 MG/DL (ref 120–199)
CHOLEST/HDLC SERPL: 6.2 {RATIO} (ref 2–5)
CO2 SERPL-SCNC: 24 MMOL/L (ref 23–29)
CREAT SERPL-MCNC: 0.8 MG/DL (ref 0.5–1.4)
DIFFERENTIAL METHOD: NORMAL
EOSINOPHIL # BLD AUTO: 0.1 K/UL (ref 0–0.5)
EOSINOPHIL NFR BLD: 2.4 % (ref 0–8)
ERYTHROCYTE [DISTWIDTH] IN BLOOD BY AUTOMATED COUNT: 11.7 % (ref 11.5–14.5)
EST. GFR  (AFRICAN AMERICAN): >60 ML/MIN/1.73 M^2
EST. GFR  (NON AFRICAN AMERICAN): >60 ML/MIN/1.73 M^2
ESTIMATED AVG GLUCOSE: 237 MG/DL (ref 68–131)
GLUCOSE SERPL-MCNC: 236 MG/DL (ref 70–110)
HBA1C MFR BLD: 9.9 % (ref 4–5.6)
HCT VFR BLD AUTO: 46 % (ref 40–54)
HDLC SERPL-MCNC: 32 MG/DL (ref 40–75)
HDLC SERPL: 16.1 % (ref 20–50)
HGB BLD-MCNC: 15.7 G/DL (ref 14–18)
IMM GRANULOCYTES # BLD AUTO: 0.03 K/UL (ref 0–0.04)
IMM GRANULOCYTES NFR BLD AUTO: 0.5 % (ref 0–0.5)
LDLC SERPL CALC-MCNC: 127 MG/DL (ref 63–159)
LIPASE SERPL-CCNC: 14 U/L (ref 4–60)
LYMPHOCYTES # BLD AUTO: 2 K/UL (ref 1–4.8)
LYMPHOCYTES NFR BLD: 34.3 % (ref 18–48)
MAGNESIUM SERPL-MCNC: 1.8 MG/DL (ref 1.6–2.6)
MCH RBC QN AUTO: 29.1 PG (ref 27–31)
MCHC RBC AUTO-ENTMCNC: 34.1 G/DL (ref 32–36)
MCV RBC AUTO: 85 FL (ref 82–98)
MONOCYTES # BLD AUTO: 0.4 K/UL (ref 0.3–1)
MONOCYTES NFR BLD: 7 % (ref 4–15)
NEUTROPHILS # BLD AUTO: 3.2 K/UL (ref 1.8–7.7)
NEUTROPHILS NFR BLD: 54.9 % (ref 38–73)
NONHDLC SERPL-MCNC: 167 MG/DL
NRBC BLD-RTO: 0 /100 WBC
PLATELET # BLD AUTO: 253 K/UL (ref 150–450)
PMV BLD AUTO: 10.3 FL (ref 9.2–12.9)
POTASSIUM SERPL-SCNC: 4.2 MMOL/L (ref 3.5–5.1)
PROT SERPL-MCNC: 6.8 G/DL (ref 6–8.4)
RBC # BLD AUTO: 5.39 M/UL (ref 4.6–6.2)
SODIUM SERPL-SCNC: 139 MMOL/L (ref 136–145)
TESTOST SERPL-MCNC: 199 NG/DL (ref 304–1227)
TRIGL SERPL-MCNC: 200 MG/DL (ref 30–150)
TSH SERPL DL<=0.005 MIU/L-ACNC: 1.29 UIU/ML (ref 0.4–4)
VIT B12 SERPL-MCNC: 618 PG/ML (ref 210–950)
WBC # BLD AUTO: 5.83 K/UL (ref 3.9–12.7)

## 2021-05-14 PROCEDURE — 82607 VITAMIN B-12: CPT | Performed by: INTERNAL MEDICINE

## 2021-05-14 PROCEDURE — 83036 HEMOGLOBIN GLYCOSYLATED A1C: CPT | Performed by: INTERNAL MEDICINE

## 2021-05-14 PROCEDURE — 83690 ASSAY OF LIPASE: CPT | Performed by: INTERNAL MEDICINE

## 2021-05-14 PROCEDURE — 80061 LIPID PANEL: CPT | Performed by: INTERNAL MEDICINE

## 2021-05-14 PROCEDURE — 84402 ASSAY OF FREE TESTOSTERONE: CPT | Performed by: INTERNAL MEDICINE

## 2021-05-14 PROCEDURE — 91300 COVID-19, MRNA, LNP-S, PF, 30 MCG/0.3 ML DOSE VACCINE: CPT | Mod: PBBFAC | Performed by: INTERNAL MEDICINE

## 2021-05-14 PROCEDURE — 84403 ASSAY OF TOTAL TESTOSTERONE: CPT | Performed by: INTERNAL MEDICINE

## 2021-05-14 PROCEDURE — 36415 COLL VENOUS BLD VENIPUNCTURE: CPT | Mod: PO | Performed by: INTERNAL MEDICINE

## 2021-05-14 PROCEDURE — 83735 ASSAY OF MAGNESIUM: CPT | Performed by: INTERNAL MEDICINE

## 2021-05-14 PROCEDURE — 82150 ASSAY OF AMYLASE: CPT | Performed by: INTERNAL MEDICINE

## 2021-05-14 PROCEDURE — 80053 COMPREHEN METABOLIC PANEL: CPT | Performed by: INTERNAL MEDICINE

## 2021-05-14 PROCEDURE — 84443 ASSAY THYROID STIM HORMONE: CPT | Performed by: INTERNAL MEDICINE

## 2021-05-14 PROCEDURE — 85025 COMPLETE CBC W/AUTO DIFF WBC: CPT | Performed by: INTERNAL MEDICINE

## 2021-05-14 PROCEDURE — 0002A COVID-19, MRNA, LNP-S, PF, 30 MCG/0.3 ML DOSE VACCINE: CPT | Mod: PBBFAC | Performed by: INTERNAL MEDICINE

## 2021-05-17 ENCOUNTER — PATIENT MESSAGE (OUTPATIENT)
Dept: FAMILY MEDICINE | Facility: CLINIC | Age: 26
End: 2021-05-17

## 2021-05-18 LAB — TESTOST FREE SERPL-MCNC: 6.9 PG/ML (ref 5.1–41.5)

## 2021-05-20 ENCOUNTER — PATIENT MESSAGE (OUTPATIENT)
Dept: GASTROENTEROLOGY | Facility: CLINIC | Age: 26
End: 2021-05-20

## 2021-05-26 DIAGNOSIS — E11.9 TYPE 2 DIABETES MELLITUS WITHOUT COMPLICATION, UNSPECIFIED WHETHER LONG TERM INSULIN USE: ICD-10-CM

## 2021-05-28 ENCOUNTER — OFFICE VISIT (OUTPATIENT)
Dept: GASTROENTEROLOGY | Facility: CLINIC | Age: 26
End: 2021-05-28
Payer: COMMERCIAL

## 2021-05-28 ENCOUNTER — OFFICE VISIT (OUTPATIENT)
Dept: FAMILY MEDICINE | Facility: CLINIC | Age: 26
End: 2021-05-28
Payer: COMMERCIAL

## 2021-05-28 VITALS — BODY MASS INDEX: 33.07 KG/M2 | WEIGHT: 257.69 LBS | HEIGHT: 74 IN

## 2021-05-28 DIAGNOSIS — E34.9 HYPOTESTOSTERONISM: ICD-10-CM

## 2021-05-28 DIAGNOSIS — G47.9 SLEEP DISORDER: ICD-10-CM

## 2021-05-28 DIAGNOSIS — E11.65 TYPE 2 DIABETES MELLITUS WITH HYPERGLYCEMIA, WITHOUT LONG-TERM CURRENT USE OF INSULIN: Primary | ICD-10-CM

## 2021-05-28 DIAGNOSIS — K52.9 CHRONIC DIARRHEA: ICD-10-CM

## 2021-05-28 DIAGNOSIS — Z01.89 ENCOUNTER FOR LABORATORY TEST: ICD-10-CM

## 2021-05-28 DIAGNOSIS — E66.9 OBESITY (BMI 30-39.9): ICD-10-CM

## 2021-05-28 DIAGNOSIS — Z78.9 ALCOHOL USE: ICD-10-CM

## 2021-05-28 DIAGNOSIS — E78.2 MIXED HYPERLIPIDEMIA: ICD-10-CM

## 2021-05-28 DIAGNOSIS — F33.42 RECURRENT MAJOR DEPRESSIVE DISORDER, IN FULL REMISSION: ICD-10-CM

## 2021-05-28 DIAGNOSIS — K92.1 HEMATOCHEZIA: ICD-10-CM

## 2021-05-28 DIAGNOSIS — Z31.69 INFERTILITY COUNSELING: ICD-10-CM

## 2021-05-28 DIAGNOSIS — F41.1 GAD (GENERALIZED ANXIETY DISORDER): ICD-10-CM

## 2021-05-28 DIAGNOSIS — K92.1 HEMATOCHEZIA: Primary | ICD-10-CM

## 2021-05-28 DIAGNOSIS — R12 HEARTBURN: ICD-10-CM

## 2021-05-28 DIAGNOSIS — E78.5 DYSLIPIDEMIA: ICD-10-CM

## 2021-05-28 DIAGNOSIS — R74.01 TRANSAMINITIS: ICD-10-CM

## 2021-05-28 PROCEDURE — 99999 PR PBB SHADOW E&M-EST. PATIENT-LVL IV: CPT | Mod: PBBFAC,,, | Performed by: NURSE PRACTITIONER

## 2021-05-28 PROCEDURE — 3046F HEMOGLOBIN A1C LEVEL >9.0%: CPT | Mod: CPTII,,, | Performed by: INTERNAL MEDICINE

## 2021-05-28 PROCEDURE — 99244 OFF/OP CNSLTJ NEW/EST MOD 40: CPT | Mod: S$GLB,,, | Performed by: NURSE PRACTITIONER

## 2021-05-28 PROCEDURE — 99214 PR OFFICE/OUTPT VISIT, EST, LEVL IV, 30-39 MIN: ICD-10-PCS | Mod: 95,,, | Performed by: INTERNAL MEDICINE

## 2021-05-28 PROCEDURE — 99214 OFFICE O/P EST MOD 30 MIN: CPT | Mod: 95,,, | Performed by: INTERNAL MEDICINE

## 2021-05-28 PROCEDURE — 3046F PR MOST RECENT HEMOGLOBIN A1C LEVEL > 9.0%: ICD-10-PCS | Mod: CPTII,,, | Performed by: INTERNAL MEDICINE

## 2021-05-28 PROCEDURE — 1126F AMNT PAIN NOTED NONE PRSNT: CPT | Mod: S$GLB,,, | Performed by: NURSE PRACTITIONER

## 2021-05-28 PROCEDURE — 1126F PR PAIN SEVERITY QUANTIFIED, NO PAIN PRESENT: ICD-10-PCS | Mod: S$GLB,,, | Performed by: NURSE PRACTITIONER

## 2021-05-28 PROCEDURE — 3008F PR BODY MASS INDEX (BMI) DOCUMENTED: ICD-10-PCS | Mod: CPTII,S$GLB,, | Performed by: NURSE PRACTITIONER

## 2021-05-28 PROCEDURE — 3008F BODY MASS INDEX DOCD: CPT | Mod: CPTII,S$GLB,, | Performed by: NURSE PRACTITIONER

## 2021-05-28 PROCEDURE — 99999 PR PBB SHADOW E&M-EST. PATIENT-LVL IV: ICD-10-PCS | Mod: PBBFAC,,, | Performed by: NURSE PRACTITIONER

## 2021-05-28 PROCEDURE — 99244 PR OFFICE CONSULTATION,LEVEL IV: ICD-10-PCS | Mod: S$GLB,,, | Performed by: NURSE PRACTITIONER

## 2021-05-28 RX ORDER — EMPAGLIFLOZIN 25 MG/1
25 TABLET, FILM COATED ORAL DAILY
Qty: 30 TABLET | Refills: 2 | Status: SHIPPED | OUTPATIENT
Start: 2021-05-28 | End: 2021-09-01 | Stop reason: SDUPTHER

## 2021-05-28 RX ORDER — PRAVASTATIN SODIUM 10 MG/1
10 TABLET ORAL NIGHTLY
Qty: 30 TABLET | Refills: 2 | Status: SHIPPED | OUTPATIENT
Start: 2021-05-28 | End: 2022-02-17

## 2021-05-28 RX ORDER — HYDROCORTISONE ACETATE 25 MG/1
25 SUPPOSITORY RECTAL 2 TIMES DAILY
Qty: 20 SUPPOSITORY | Refills: 0 | Status: SHIPPED | OUTPATIENT
Start: 2021-05-28 | End: 2021-06-07

## 2021-05-28 RX ORDER — FLUOXETINE HYDROCHLORIDE 20 MG/1
20 CAPSULE ORAL DAILY
Qty: 90 CAPSULE | Refills: 1 | Status: SHIPPED | OUTPATIENT
Start: 2021-05-28 | End: 2021-10-05 | Stop reason: SDUPTHER

## 2021-05-31 ENCOUNTER — PATIENT MESSAGE (OUTPATIENT)
Dept: FAMILY MEDICINE | Facility: CLINIC | Age: 26
End: 2021-05-31

## 2021-06-18 ENCOUNTER — PATIENT MESSAGE (OUTPATIENT)
Dept: GASTROENTEROLOGY | Facility: CLINIC | Age: 26
End: 2021-06-18

## 2021-06-18 ENCOUNTER — TELEPHONE (OUTPATIENT)
Dept: ENDOSCOPY | Facility: HOSPITAL | Age: 26
End: 2021-06-18

## 2021-06-29 ENCOUNTER — PATIENT OUTREACH (OUTPATIENT)
Dept: ADMINISTRATIVE | Facility: OTHER | Age: 26
End: 2021-06-29

## 2021-06-30 ENCOUNTER — OFFICE VISIT (OUTPATIENT)
Dept: UROLOGY | Facility: CLINIC | Age: 26
End: 2021-06-30
Payer: COMMERCIAL

## 2021-06-30 VITALS — HEIGHT: 73 IN | BODY MASS INDEX: 34.04 KG/M2 | WEIGHT: 256.81 LBS

## 2021-06-30 DIAGNOSIS — E29.1 HYPOGONADISM MALE: Primary | ICD-10-CM

## 2021-06-30 PROCEDURE — 1126F PR PAIN SEVERITY QUANTIFIED, NO PAIN PRESENT: ICD-10-PCS | Mod: S$GLB,,, | Performed by: UROLOGY

## 2021-06-30 PROCEDURE — 99999 PR PBB SHADOW E&M-EST. PATIENT-LVL III: ICD-10-PCS | Mod: PBBFAC,,, | Performed by: UROLOGY

## 2021-06-30 PROCEDURE — 3008F PR BODY MASS INDEX (BMI) DOCUMENTED: ICD-10-PCS | Mod: CPTII,S$GLB,, | Performed by: UROLOGY

## 2021-06-30 PROCEDURE — 99999 PR PBB SHADOW E&M-EST. PATIENT-LVL III: CPT | Mod: PBBFAC,,, | Performed by: UROLOGY

## 2021-06-30 PROCEDURE — 99204 PR OFFICE/OUTPT VISIT, NEW, LEVL IV, 45-59 MIN: ICD-10-PCS | Mod: S$GLB,,, | Performed by: UROLOGY

## 2021-06-30 PROCEDURE — 1126F AMNT PAIN NOTED NONE PRSNT: CPT | Mod: S$GLB,,, | Performed by: UROLOGY

## 2021-06-30 PROCEDURE — 99204 OFFICE O/P NEW MOD 45 MIN: CPT | Mod: S$GLB,,, | Performed by: UROLOGY

## 2021-06-30 PROCEDURE — 3008F BODY MASS INDEX DOCD: CPT | Mod: CPTII,S$GLB,, | Performed by: UROLOGY

## 2021-06-30 RX ORDER — FERROUS SULFATE 324(65)MG
324 TABLET, DELAYED RELEASE (ENTERIC COATED) ORAL DAILY
COMMUNITY
End: 2022-04-20

## 2021-07-07 ENCOUNTER — PATIENT MESSAGE (OUTPATIENT)
Dept: ADMINISTRATIVE | Facility: HOSPITAL | Age: 26
End: 2021-07-07

## 2021-07-30 ENCOUNTER — TELEPHONE (OUTPATIENT)
Dept: UROLOGY | Facility: CLINIC | Age: 26
End: 2021-07-30

## 2021-08-06 ENCOUNTER — PATIENT MESSAGE (OUTPATIENT)
Dept: UROLOGY | Facility: CLINIC | Age: 26
End: 2021-08-06

## 2021-08-23 ENCOUNTER — PATIENT MESSAGE (OUTPATIENT)
Dept: UROLOGY | Facility: CLINIC | Age: 26
End: 2021-08-23

## 2021-09-01 DIAGNOSIS — E11.65 TYPE 2 DIABETES MELLITUS WITH HYPERGLYCEMIA, WITHOUT LONG-TERM CURRENT USE OF INSULIN: ICD-10-CM

## 2021-09-01 RX ORDER — EMPAGLIFLOZIN 25 MG/1
25 TABLET, FILM COATED ORAL DAILY
Qty: 90 TABLET | Refills: 1 | Status: SHIPPED | OUTPATIENT
Start: 2021-09-01 | End: 2022-03-31

## 2021-09-08 ENCOUNTER — TELEPHONE (OUTPATIENT)
Dept: UROLOGY | Facility: CLINIC | Age: 26
End: 2021-09-08

## 2021-09-10 ENCOUNTER — LAB VISIT (OUTPATIENT)
Dept: LAB | Facility: HOSPITAL | Age: 26
End: 2021-09-10
Attending: UROLOGY
Payer: COMMERCIAL

## 2021-09-10 DIAGNOSIS — E29.1 HYPOGONADISM MALE: ICD-10-CM

## 2021-09-10 LAB
FSH SERPL-ACNC: 3.37 MIU/ML (ref 0.95–11.95)
LH SERPL-ACNC: 2.7 MIU/ML (ref 0.6–12.1)
TESTOST SERPL-MCNC: 246 NG/DL (ref 304–1227)

## 2021-09-10 PROCEDURE — 83001 ASSAY OF GONADOTROPIN (FSH): CPT | Performed by: UROLOGY

## 2021-09-10 PROCEDURE — 84402 ASSAY OF FREE TESTOSTERONE: CPT | Performed by: UROLOGY

## 2021-09-10 PROCEDURE — 83002 ASSAY OF GONADOTROPIN (LH): CPT | Performed by: UROLOGY

## 2021-09-10 PROCEDURE — 84403 ASSAY OF TOTAL TESTOSTERONE: CPT | Performed by: UROLOGY

## 2021-09-15 LAB — TESTOST FREE SERPL-MCNC: 9.3 PG/ML (ref 5.1–41.5)

## 2021-09-21 ENCOUNTER — PATIENT MESSAGE (OUTPATIENT)
Dept: UROLOGY | Facility: CLINIC | Age: 26
End: 2021-09-21

## 2021-09-21 ENCOUNTER — PATIENT MESSAGE (OUTPATIENT)
Dept: FAMILY MEDICINE | Facility: CLINIC | Age: 26
End: 2021-09-21

## 2021-09-22 ENCOUNTER — PATIENT MESSAGE (OUTPATIENT)
Dept: PSYCHIATRY | Facility: CLINIC | Age: 26
End: 2021-09-22

## 2021-09-22 ENCOUNTER — PATIENT MESSAGE (OUTPATIENT)
Dept: FAMILY MEDICINE | Facility: CLINIC | Age: 26
End: 2021-09-22

## 2021-09-22 DIAGNOSIS — G47.9 SLEEP DISORDER: ICD-10-CM

## 2021-09-22 DIAGNOSIS — F33.42 RECURRENT MAJOR DEPRESSIVE DISORDER, IN FULL REMISSION: Primary | ICD-10-CM

## 2021-09-22 DIAGNOSIS — F41.1 GAD (GENERALIZED ANXIETY DISORDER): ICD-10-CM

## 2021-09-28 ENCOUNTER — LAB VISIT (OUTPATIENT)
Dept: LAB | Facility: HOSPITAL | Age: 26
End: 2021-09-28
Attending: INTERNAL MEDICINE
Payer: COMMERCIAL

## 2021-09-28 DIAGNOSIS — R74.01 TRANSAMINITIS: ICD-10-CM

## 2021-09-28 DIAGNOSIS — E78.2 MIXED HYPERLIPIDEMIA: ICD-10-CM

## 2021-09-28 DIAGNOSIS — E78.5 DYSLIPIDEMIA: ICD-10-CM

## 2021-09-28 DIAGNOSIS — E66.9 OBESITY (BMI 30-39.9): ICD-10-CM

## 2021-09-28 DIAGNOSIS — E11.65 TYPE 2 DIABETES MELLITUS WITH HYPERGLYCEMIA, WITHOUT LONG-TERM CURRENT USE OF INSULIN: ICD-10-CM

## 2021-09-28 LAB
ALBUMIN SERPL BCP-MCNC: 4.1 G/DL (ref 3.5–5.2)
ALP SERPL-CCNC: 68 U/L (ref 55–135)
ALT SERPL W/O P-5'-P-CCNC: 45 U/L (ref 10–44)
ANION GAP SERPL CALC-SCNC: 14 MMOL/L (ref 8–16)
AST SERPL-CCNC: 22 U/L (ref 10–40)
BILIRUB SERPL-MCNC: 0.8 MG/DL (ref 0.1–1)
BUN SERPL-MCNC: 13 MG/DL (ref 6–20)
CALCIUM SERPL-MCNC: 9.9 MG/DL (ref 8.7–10.5)
CHLORIDE SERPL-SCNC: 104 MMOL/L (ref 95–110)
CHOLEST SERPL-MCNC: 251 MG/DL (ref 120–199)
CHOLEST/HDLC SERPL: 7.4 {RATIO} (ref 2–5)
CO2 SERPL-SCNC: 21 MMOL/L (ref 23–29)
CREAT SERPL-MCNC: 0.8 MG/DL (ref 0.5–1.4)
EST. GFR  (AFRICAN AMERICAN): >60 ML/MIN/1.73 M^2
EST. GFR  (NON AFRICAN AMERICAN): >60 ML/MIN/1.73 M^2
ESTIMATED AVG GLUCOSE: 214 MG/DL (ref 68–131)
GLUCOSE SERPL-MCNC: 201 MG/DL (ref 70–110)
HBA1C MFR BLD: 9.1 % (ref 4–5.6)
HDLC SERPL-MCNC: 34 MG/DL (ref 40–75)
HDLC SERPL: 13.5 % (ref 20–50)
LDLC SERPL CALC-MCNC: 150.6 MG/DL (ref 63–159)
NONHDLC SERPL-MCNC: 217 MG/DL
POTASSIUM SERPL-SCNC: 4 MMOL/L (ref 3.5–5.1)
PROT SERPL-MCNC: 7.2 G/DL (ref 6–8.4)
SODIUM SERPL-SCNC: 139 MMOL/L (ref 136–145)
TRIGL SERPL-MCNC: 332 MG/DL (ref 30–150)

## 2021-09-28 PROCEDURE — 36415 COLL VENOUS BLD VENIPUNCTURE: CPT | Mod: PO | Performed by: INTERNAL MEDICINE

## 2021-09-28 PROCEDURE — 80053 COMPREHEN METABOLIC PANEL: CPT | Performed by: INTERNAL MEDICINE

## 2021-09-28 PROCEDURE — 83036 HEMOGLOBIN GLYCOSYLATED A1C: CPT | Performed by: INTERNAL MEDICINE

## 2021-09-28 PROCEDURE — 80061 LIPID PANEL: CPT | Performed by: INTERNAL MEDICINE

## 2021-10-05 ENCOUNTER — OFFICE VISIT (OUTPATIENT)
Dept: PSYCHIATRY | Facility: CLINIC | Age: 26
End: 2021-10-05
Payer: COMMERCIAL

## 2021-10-05 DIAGNOSIS — F33.42 RECURRENT MAJOR DEPRESSIVE DISORDER, IN FULL REMISSION: ICD-10-CM

## 2021-10-05 DIAGNOSIS — F41.1 GAD (GENERALIZED ANXIETY DISORDER): ICD-10-CM

## 2021-10-05 DIAGNOSIS — G47.9 SLEEP DISORDER: ICD-10-CM

## 2021-10-05 DIAGNOSIS — E11.65 TYPE 2 DIABETES MELLITUS WITH HYPERGLYCEMIA, WITHOUT LONG-TERM CURRENT USE OF INSULIN: ICD-10-CM

## 2021-10-05 PROCEDURE — 1159F PR MEDICATION LIST DOCUMENTED IN MEDICAL RECORD: ICD-10-PCS | Mod: CPTII,95,, | Performed by: NURSE PRACTITIONER

## 2021-10-05 PROCEDURE — 3066F PR DOCUMENTATION OF TREATMENT FOR NEPHROPATHY: ICD-10-PCS | Mod: CPTII,95,, | Performed by: NURSE PRACTITIONER

## 2021-10-05 PROCEDURE — 3066F NEPHROPATHY DOC TX: CPT | Mod: CPTII,95,, | Performed by: NURSE PRACTITIONER

## 2021-10-05 PROCEDURE — 99213 OFFICE O/P EST LOW 20 MIN: CPT | Mod: 95,,, | Performed by: NURSE PRACTITIONER

## 2021-10-05 PROCEDURE — 3046F PR MOST RECENT HEMOGLOBIN A1C LEVEL > 9.0%: ICD-10-PCS | Mod: CPTII,95,, | Performed by: NURSE PRACTITIONER

## 2021-10-05 PROCEDURE — 3061F PR NEG MICROALBUMINURIA RESULT DOCUMENTED/REVIEW: ICD-10-PCS | Mod: CPTII,95,, | Performed by: NURSE PRACTITIONER

## 2021-10-05 PROCEDURE — 3061F NEG MICROALBUMINURIA REV: CPT | Mod: CPTII,95,, | Performed by: NURSE PRACTITIONER

## 2021-10-05 PROCEDURE — 1160F RVW MEDS BY RX/DR IN RCRD: CPT | Mod: CPTII,95,, | Performed by: NURSE PRACTITIONER

## 2021-10-05 PROCEDURE — 99213 PR OFFICE/OUTPT VISIT, EST, LEVL III, 20-29 MIN: ICD-10-PCS | Mod: 95,,, | Performed by: NURSE PRACTITIONER

## 2021-10-05 PROCEDURE — 1159F MED LIST DOCD IN RCRD: CPT | Mod: CPTII,95,, | Performed by: NURSE PRACTITIONER

## 2021-10-05 PROCEDURE — 1160F PR REVIEW ALL MEDS BY PRESCRIBER/CLIN PHARMACIST DOCUMENTED: ICD-10-PCS | Mod: CPTII,95,, | Performed by: NURSE PRACTITIONER

## 2021-10-05 PROCEDURE — 3046F HEMOGLOBIN A1C LEVEL >9.0%: CPT | Mod: CPTII,95,, | Performed by: NURSE PRACTITIONER

## 2021-10-05 RX ORDER — FLUOXETINE HYDROCHLORIDE 20 MG/1
20 CAPSULE ORAL DAILY
Qty: 90 CAPSULE | Refills: 3 | Status: SHIPPED | OUTPATIENT
Start: 2021-10-05 | End: 2021-11-24 | Stop reason: ALTCHOICE

## 2021-10-05 RX ORDER — BUPROPION HYDROCHLORIDE 300 MG/1
300 TABLET ORAL DAILY
Qty: 90 TABLET | Refills: 3 | Status: SHIPPED | OUTPATIENT
Start: 2021-10-05 | End: 2022-12-09 | Stop reason: SDUPTHER

## 2021-11-01 ENCOUNTER — OFFICE VISIT (OUTPATIENT)
Dept: PSYCHIATRY | Facility: CLINIC | Age: 26
End: 2021-11-01
Payer: COMMERCIAL

## 2021-11-01 DIAGNOSIS — F33.1 MODERATE EPISODE OF RECURRENT MAJOR DEPRESSIVE DISORDER: ICD-10-CM

## 2021-11-01 DIAGNOSIS — F41.1 GAD (GENERALIZED ANXIETY DISORDER): ICD-10-CM

## 2021-11-01 PROCEDURE — 3066F PR DOCUMENTATION OF TREATMENT FOR NEPHROPATHY: ICD-10-PCS | Mod: CPTII,S$GLB,, | Performed by: PSYCHOLOGIST

## 2021-11-01 PROCEDURE — 3066F NEPHROPATHY DOC TX: CPT | Mod: CPTII,S$GLB,, | Performed by: PSYCHOLOGIST

## 2021-11-01 PROCEDURE — 3061F PR NEG MICROALBUMINURIA RESULT DOCUMENTED/REVIEW: ICD-10-PCS | Mod: CPTII,S$GLB,, | Performed by: PSYCHOLOGIST

## 2021-11-01 PROCEDURE — 3061F NEG MICROALBUMINURIA REV: CPT | Mod: CPTII,S$GLB,, | Performed by: PSYCHOLOGIST

## 2021-11-01 PROCEDURE — 90791 PSYCH DIAGNOSTIC EVALUATION: CPT | Mod: S$GLB,,, | Performed by: PSYCHOLOGIST

## 2021-11-01 PROCEDURE — 99999 PR PBB SHADOW E&M-EST. PATIENT-LVL II: ICD-10-PCS | Mod: PBBFAC,,, | Performed by: PSYCHOLOGIST

## 2021-11-01 PROCEDURE — 99999 PR PBB SHADOW E&M-EST. PATIENT-LVL II: CPT | Mod: PBBFAC,,, | Performed by: PSYCHOLOGIST

## 2021-11-01 PROCEDURE — 3046F HEMOGLOBIN A1C LEVEL >9.0%: CPT | Mod: CPTII,S$GLB,, | Performed by: PSYCHOLOGIST

## 2021-11-01 PROCEDURE — 3046F PR MOST RECENT HEMOGLOBIN A1C LEVEL > 9.0%: ICD-10-PCS | Mod: CPTII,S$GLB,, | Performed by: PSYCHOLOGIST

## 2021-11-01 PROCEDURE — 90791 PR PSYCHIATRIC DIAGNOSTIC EVALUATION: ICD-10-PCS | Mod: S$GLB,,, | Performed by: PSYCHOLOGIST

## 2021-11-03 ENCOUNTER — PATIENT MESSAGE (OUTPATIENT)
Dept: PSYCHIATRY | Facility: CLINIC | Age: 26
End: 2021-11-03
Payer: COMMERCIAL

## 2021-11-24 ENCOUNTER — OFFICE VISIT (OUTPATIENT)
Dept: PSYCHIATRY | Facility: CLINIC | Age: 26
End: 2021-11-24
Payer: COMMERCIAL

## 2021-11-24 VITALS
HEART RATE: 65 BPM | SYSTOLIC BLOOD PRESSURE: 138 MMHG | BODY MASS INDEX: 33.28 KG/M2 | WEIGHT: 251.13 LBS | DIASTOLIC BLOOD PRESSURE: 88 MMHG | HEIGHT: 73 IN

## 2021-11-24 DIAGNOSIS — F33.1 MODERATE EPISODE OF RECURRENT MAJOR DEPRESSIVE DISORDER: Primary | ICD-10-CM

## 2021-11-24 DIAGNOSIS — F41.1 GAD (GENERALIZED ANXIETY DISORDER): ICD-10-CM

## 2021-11-24 PROCEDURE — 90792 PSYCH DIAG EVAL W/MED SRVCS: CPT | Mod: S$GLB,,,

## 2021-11-24 PROCEDURE — 3066F NEPHROPATHY DOC TX: CPT | Mod: CPTII,S$GLB,,

## 2021-11-24 PROCEDURE — 3061F NEG MICROALBUMINURIA REV: CPT | Mod: CPTII,S$GLB,,

## 2021-11-24 PROCEDURE — 90792 PR PSYCHIATRIC DIAGNOSTIC EVALUATION W/MEDICAL SERVICES: ICD-10-PCS | Mod: S$GLB,,,

## 2021-11-24 PROCEDURE — 3061F PR NEG MICROALBUMINURIA RESULT DOCUMENTED/REVIEW: ICD-10-PCS | Mod: CPTII,S$GLB,,

## 2021-11-24 PROCEDURE — 3066F PR DOCUMENTATION OF TREATMENT FOR NEPHROPATHY: ICD-10-PCS | Mod: CPTII,S$GLB,,

## 2021-11-24 PROCEDURE — 99999 PR PBB SHADOW E&M-EST. PATIENT-LVL III: ICD-10-PCS | Mod: PBBFAC,,,

## 2021-11-24 PROCEDURE — 99999 PR PBB SHADOW E&M-EST. PATIENT-LVL III: CPT | Mod: PBBFAC,,,

## 2021-11-24 RX ORDER — SERTRALINE HYDROCHLORIDE 25 MG/1
TABLET, FILM COATED ORAL
Qty: 46 TABLET | Refills: 0 | Status: SHIPPED | OUTPATIENT
Start: 2021-11-24 | End: 2021-12-22

## 2021-11-30 ENCOUNTER — OFFICE VISIT (OUTPATIENT)
Dept: PSYCHIATRY | Facility: CLINIC | Age: 26
End: 2021-11-30
Payer: COMMERCIAL

## 2021-11-30 DIAGNOSIS — F33.1 MODERATE EPISODE OF RECURRENT MAJOR DEPRESSIVE DISORDER: Primary | ICD-10-CM

## 2021-11-30 DIAGNOSIS — F41.1 GAD (GENERALIZED ANXIETY DISORDER): ICD-10-CM

## 2021-11-30 PROCEDURE — 3061F NEG MICROALBUMINURIA REV: CPT | Mod: CPTII,S$GLB,, | Performed by: PSYCHOLOGIST

## 2021-11-30 PROCEDURE — 90834 PSYTX W PT 45 MINUTES: CPT | Mod: S$GLB,,, | Performed by: PSYCHOLOGIST

## 2021-11-30 PROCEDURE — 90834 PR PSYCHOTHERAPY W/PATIENT, 45 MIN: ICD-10-PCS | Mod: S$GLB,,, | Performed by: PSYCHOLOGIST

## 2021-11-30 PROCEDURE — 3066F PR DOCUMENTATION OF TREATMENT FOR NEPHROPATHY: ICD-10-PCS | Mod: CPTII,S$GLB,, | Performed by: PSYCHOLOGIST

## 2021-11-30 PROCEDURE — 3066F NEPHROPATHY DOC TX: CPT | Mod: CPTII,S$GLB,, | Performed by: PSYCHOLOGIST

## 2021-11-30 PROCEDURE — 3061F PR NEG MICROALBUMINURIA RESULT DOCUMENTED/REVIEW: ICD-10-PCS | Mod: CPTII,S$GLB,, | Performed by: PSYCHOLOGIST

## 2021-12-20 ENCOUNTER — PATIENT OUTREACH (OUTPATIENT)
Dept: ADMINISTRATIVE | Facility: HOSPITAL | Age: 26
End: 2021-12-20
Payer: COMMERCIAL

## 2021-12-20 ENCOUNTER — PATIENT MESSAGE (OUTPATIENT)
Dept: ADMINISTRATIVE | Facility: HOSPITAL | Age: 26
End: 2021-12-20
Payer: COMMERCIAL

## 2021-12-22 ENCOUNTER — OFFICE VISIT (OUTPATIENT)
Dept: PSYCHIATRY | Facility: CLINIC | Age: 26
End: 2021-12-22
Payer: COMMERCIAL

## 2021-12-22 DIAGNOSIS — F41.1 GAD (GENERALIZED ANXIETY DISORDER): ICD-10-CM

## 2021-12-22 DIAGNOSIS — F33.1 MODERATE EPISODE OF RECURRENT MAJOR DEPRESSIVE DISORDER: Primary | ICD-10-CM

## 2021-12-22 PROCEDURE — 1159F PR MEDICATION LIST DOCUMENTED IN MEDICAL RECORD: ICD-10-PCS | Mod: CPTII,95,,

## 2021-12-22 PROCEDURE — 99214 PR OFFICE/OUTPT VISIT, EST, LEVL IV, 30-39 MIN: ICD-10-PCS | Mod: 95,,,

## 2021-12-22 PROCEDURE — 99214 OFFICE O/P EST MOD 30 MIN: CPT | Mod: 95,,,

## 2021-12-22 PROCEDURE — 1160F PR REVIEW ALL MEDS BY PRESCRIBER/CLIN PHARMACIST DOCUMENTED: ICD-10-PCS | Mod: CPTII,95,,

## 2021-12-22 PROCEDURE — 1159F MED LIST DOCD IN RCRD: CPT | Mod: CPTII,95,,

## 2021-12-22 PROCEDURE — 3066F NEPHROPATHY DOC TX: CPT | Mod: CPTII,95,,

## 2021-12-22 PROCEDURE — 3066F PR DOCUMENTATION OF TREATMENT FOR NEPHROPATHY: ICD-10-PCS | Mod: CPTII,95,,

## 2021-12-22 PROCEDURE — 3046F HEMOGLOBIN A1C LEVEL >9.0%: CPT | Mod: CPTII,95,,

## 2021-12-22 PROCEDURE — 3061F PR NEG MICROALBUMINURIA RESULT DOCUMENTED/REVIEW: ICD-10-PCS | Mod: CPTII,95,,

## 2021-12-22 PROCEDURE — 3061F NEG MICROALBUMINURIA REV: CPT | Mod: CPTII,95,,

## 2021-12-22 PROCEDURE — 1160F RVW MEDS BY RX/DR IN RCRD: CPT | Mod: CPTII,95,,

## 2021-12-22 PROCEDURE — 3046F PR MOST RECENT HEMOGLOBIN A1C LEVEL > 9.0%: ICD-10-PCS | Mod: CPTII,95,,

## 2021-12-22 RX ORDER — SERTRALINE HYDROCHLORIDE 50 MG/1
50 TABLET, FILM COATED ORAL DAILY
Qty: 90 TABLET | Refills: 0 | Status: SHIPPED | OUTPATIENT
Start: 2021-12-22 | End: 2022-04-05

## 2022-01-04 ENCOUNTER — OFFICE VISIT (OUTPATIENT)
Dept: PSYCHIATRY | Facility: CLINIC | Age: 27
End: 2022-01-04
Payer: COMMERCIAL

## 2022-01-04 ENCOUNTER — PATIENT MESSAGE (OUTPATIENT)
Dept: PSYCHIATRY | Facility: CLINIC | Age: 27
End: 2022-01-04
Payer: COMMERCIAL

## 2022-01-04 DIAGNOSIS — F41.1 GAD (GENERALIZED ANXIETY DISORDER): Primary | ICD-10-CM

## 2022-01-04 DIAGNOSIS — F33.1 MODERATE EPISODE OF RECURRENT MAJOR DEPRESSIVE DISORDER: ICD-10-CM

## 2022-01-04 PROCEDURE — 90832 PSYTX W PT 30 MINUTES: CPT | Mod: 95,,, | Performed by: PSYCHOLOGIST

## 2022-01-04 PROCEDURE — 90832 PR PSYCHOTHERAPY W/PATIENT, 30 MIN: ICD-10-PCS | Mod: 95,,, | Performed by: PSYCHOLOGIST

## 2022-01-04 NOTE — PROGRESS NOTES
Primary Care Behavioral Health: Follow-Up  Patient Name:  Roberto Carlos Souza  Date:  01/04/2022  Site:  [facility]  Referral source:  Manan Arthur MD     The patient location is:  Patient was parked in his car in [place], [place] at a gas station.  The patient phone number is: [phone]  Visit type: Virtual visit with synchronous audio and video  Each patient to whom he or she provides medical services by telemedicine is:  (1) informed of the relationship between the physician and patient and the respective role of any other health care provider with respect to management of the patient; and (2) notified that he or she may decline to receive medical services by telemedicine and may withdraw from such care at any time.    Chief complaint/reason for encounter:  Anxiety and Depression    History of present illness:  Mr. Roberto Carlos Souza is a 26 year old /White male referred by for symptoms of depression and anxiety. Patient was seen, examined and chart was reviewed.  Patient presents today noting he was previously prescribed Wellbutrin and Prozac and was recently changed Wellbutrin and Zoloft.  Patient notes he has noticed an improvement in symptoms of depression.  Patient notes he has had some improvement in marital tension.  Patient notes he continues to experience daily symptoms of anxiety.  Patient notes he has been experiencing ongoing symptoms of worry with intermittent episodes of increased anxiety with increased HR and restlessness.  Patient notes is has been feeling worried and becomes overwhelmed at times.    Past Medical History:   Diagnosis Date    Anxiety     Diabetes mellitus, type 2     2015 dx.    Dyslipidemia     Hypertension     lisinopril in past    Obesity        Current Outpatient Medications:     blood sugar diagnostic (CONTOUR NEXT STRIPS) Strp, 1 each by Misc.(Non-Drug; Combo Route) route once daily., Disp: 30 each, Rfl: 11    buPROPion (WELLBUTRIN XL) 300 MG 24 hr tablet, Take  1 tablet (300 mg total) by mouth once daily., Disp: 90 tablet, Rfl: 3    empagliflozin (JARDIANCE) 25 mg tablet, Take 1 tablet (25 mg total) by mouth once daily., Disp: 90 tablet, Rfl: 1    ferrous sulfate 324 mg (65 mg iron) TbEC, Take 324 mg by mouth once daily., Disp: , Rfl:     lancets (MICROLET LANCET) Misc, 1 each by Misc.(Non-Drug; Combo Route) route 3 (three) times daily., Disp: 100 each, Rfl: 11    lisinopriL 10 MG tablet, TAKE 1 TABLET BY MOUTH EVERY DAY (Patient not taking: Reported on 11/24/2021), Disp: 90 tablet, Rfl: 3    pantoprazole (PROTONIX) 40 MG tablet, Take 1 tablet (40 mg total) by mouth once daily. (Patient not taking: Reported on 11/24/2021), Disp: 30 tablet, Rfl: 2    pravastatin (PRAVACHOL) 10 MG tablet, Take 1 tablet (10 mg total) by mouth every evening. (Patient not taking: Reported on 11/24/2021), Disp: 30 tablet, Rfl: 2    sertraline (ZOLOFT) 50 MG tablet, Take 1 tablet (50 mg total) by mouth once daily., Disp: 90 tablet, Rfl: 0    Psychiatric symptoms:  Depression: Patient notes symptoms of down mood, decreased interest in engaging in tasks, attention/concentration difficulty, feeling bad about himself however notes overall symptoms of depression have demonstrated improvement.  He denied suicidal ideation.  Josefina/Hypomania:  Denied.  He denied periods of elevated mood or abnormally increased energy or goal-directed activity.  Anxiety:  Patient endorses symptoms of feeling overwhelmed, nervousness and worry.  Thoughts:  Denied delusions, hallucinations.  Suicidal thoughts/behaviors:  Patient denied suicidal and homicidal ideation, plan and intent.  Patient noted agreement to call 911/and or present to the ED if he experienced suicidal or homicidal ideation, plan or intent.    Self-injury:  Denied.  Sleep: Patient notes he has been experiencing some sleep difficulty which he relates to needing a new pillow.  Trauma: Patient denies.    Mental Status Exam:  General appearance:   appears stated age, neatly dressed, well groomed  Speech:  normal rate, normal tone, normal pitch, normal volume  Level of cooperation:  cooperative  Thought processes:  logical, goal-directed  Mood:  euthymic  Thought content: relevant and appropriate  Affect:  appropriate  Orientation:  oriented to person, place, situation and date  Memory/Attention and Concentration:  No gross deficits made evident during conversation.  Judgment and insight: fair  Language:  intact    Impression: Patient endorses symptoms of depression and anxiety for several years which he notes has significantly exacerbated throughout the last year due to several stressors.  Patient notes since meeting with provider last he has experienced some improvement in symptoms of depression.  Patient endorses ongoing symptoms of anxiety.    Diagnosis:  1. MARCOS (generalized anxiety disorder)     2. Moderate episode of recurrent major depressive disorder       Plan:    1.  Patient provided psychoeducation on anxiety and depression.  2.  Methods of management of episodes of increased anxiety discussed; change of setting, deep breathing/relaxation, distraction.  3. Stress management discussed.  4.  Challenging anxious thinking and identifying thinking errors discussed.  5.  Patient to follow-up in 2-3 weeks.    Length of Appointment: 20 min

## 2022-01-16 ENCOUNTER — TELEPHONE (OUTPATIENT)
Dept: UROLOGY | Facility: CLINIC | Age: 27
End: 2022-01-16
Payer: COMMERCIAL

## 2022-01-16 NOTE — TELEPHONE ENCOUNTER
"I called pt again to go over his case and to see if any progress had been made. He said that unfortunately "they are still trying", as no pregnancies have occurred. His semen analysis was completely normal and his sexual hormone profile also was normal. He will let us know if he needs us in the future.  "

## 2022-01-28 ENCOUNTER — PATIENT OUTREACH (OUTPATIENT)
Dept: ADMINISTRATIVE | Facility: HOSPITAL | Age: 27
End: 2022-01-28
Payer: COMMERCIAL

## 2022-01-28 ENCOUNTER — OFFICE VISIT (OUTPATIENT)
Dept: PSYCHIATRY | Facility: CLINIC | Age: 27
End: 2022-01-28
Payer: COMMERCIAL

## 2022-01-28 DIAGNOSIS — Z00.00 ROUTINE GENERAL MEDICAL EXAMINATION AT A HEALTH CARE FACILITY: ICD-10-CM

## 2022-01-28 DIAGNOSIS — E11.9 DIABETES MELLITUS WITHOUT COMPLICATION: Primary | ICD-10-CM

## 2022-01-28 DIAGNOSIS — F41.1 GAD (GENERALIZED ANXIETY DISORDER): Primary | ICD-10-CM

## 2022-01-28 DIAGNOSIS — F33.1 MODERATE EPISODE OF RECURRENT MAJOR DEPRESSIVE DISORDER: ICD-10-CM

## 2022-01-28 PROCEDURE — 90832 PSYTX W PT 30 MINUTES: CPT | Mod: 95,,, | Performed by: PSYCHOLOGIST

## 2022-01-28 PROCEDURE — 90832 PR PSYCHOTHERAPY W/PATIENT, 30 MIN: ICD-10-PCS | Mod: 95,,, | Performed by: PSYCHOLOGIST

## 2022-01-28 NOTE — PROGRESS NOTES
2022 Care Everywhere updates requested and reviewed.  Immunizations reconciled. Media reports reviewed.  Duplicate HM overrides and  orders removed.  Overdue HM topic chart audit and/or requested.  Overdue lab testing linked to upcoming lab appointments if applies.  Lab [facility], and [facility] reviewed   Lab testing       Health Maintenance Due   Topic Date Due    Hepatitis C Screening  Never done    Pneumococcal Vaccines (Age 0-64) (1 of 2 - PPSV23) Never done    Eye Exam  Never done    HIV Screening  Never done    Foot Exam  2020    Influenza Vaccine (1) 2021    COVID-19 Vaccine (3 - Booster for Pfizer series) 2021    Hemoglobin A1c  2021     WOG entered for A1C and Hep C-scheduled    Pt will address all other HM at appt scheduled

## 2022-01-28 NOTE — PROGRESS NOTES
Primary Care Behavioral Health: Follow-Up  Patient Name:  Roberto Carlos Souza  Date:  01/28/2022  Site:  [facility]  Referral source:  Manan Arthur MD     The patient location is:  Patient was parked in his car in [facility], [place] parked in car  The patient phone number is: [phone]  Visit type: Virtual visit with synchronous audio and video  Each patient to whom he or she provides medical services by telemedicine is:  (1) informed of the relationship between the physician and patient and the respective role of any other health care provider with respect to management of the patient; and (2) notified that he or she may decline to receive medical services by telemedicine and may withdraw from such care at any time.    Chief complaint/reason for encounter:  Anxiety and Depression    History of present illness:  Mr. Roberto Carlos Souza is a 26 year old /White male referred by for symptoms of depression and anxiety. Patient was seen, examined and chart was reviewed.  Patient presents today noting some improvement in symptoms of anxiety.  Patient notes he does continue to experience worrisome thinking that he might 'do something wrong' or 'something might go bad'.  Patient notes this particularly occurs when things are going well.  Patient since talking with provider last he notes he has not experienced any episodes of increased HR and restlessness.    Past Medical History:   Diagnosis Date    Anxiety     Diabetes mellitus, type 2     2015 dx.    Dyslipidemia     Hypertension     lisinopril in past    Obesity        Current Outpatient Medications:     blood sugar diagnostic (CONTOUR NEXT STRIPS) Strp, 1 each by Misc.(Non-Drug; Combo Route) route once daily., Disp: 30 each, Rfl: 11    buPROPion (WELLBUTRIN XL) 300 MG 24 hr tablet, Take 1 tablet (300 mg total) by mouth once daily., Disp: 90 tablet, Rfl: 3    empagliflozin (JARDIANCE) 25 mg tablet, Take 1 tablet (25 mg total) by mouth once daily.,  Disp: 90 tablet, Rfl: 1    ferrous sulfate 324 mg (65 mg iron) TbEC, Take 324 mg by mouth once daily., Disp: , Rfl:     lancets (MICROLET LANCET) Misc, 1 each by Misc.(Non-Drug; Combo Route) route 3 (three) times daily., Disp: 100 each, Rfl: 11    lisinopriL 10 MG tablet, TAKE 1 TABLET BY MOUTH EVERY DAY (Patient not taking: Reported on 11/24/2021), Disp: 90 tablet, Rfl: 3    pantoprazole (PROTONIX) 40 MG tablet, Take 1 tablet (40 mg total) by mouth once daily. (Patient not taking: Reported on 11/24/2021), Disp: 30 tablet, Rfl: 2    pravastatin (PRAVACHOL) 10 MG tablet, Take 1 tablet (10 mg total) by mouth every evening. (Patient not taking: Reported on 11/24/2021), Disp: 30 tablet, Rfl: 2    sertraline (ZOLOFT) 50 MG tablet, Take 1 tablet (50 mg total) by mouth once daily., Disp: 90 tablet, Rfl: 0    Psychiatric symptoms:  Depression: Patient notes symptoms of down mood, decreased interest in engaging in tasks, attention/concentration difficulty, feeling bad about himself however notes overall symptoms of depression have demonstrated improvement.  He denied suicidal ideation.  Josefina/Hypomania:  Denied.  He denied periods of elevated mood or abnormally increased energy or goal-directed activity.  Anxiety:  Patient endorses symptoms of feeling nervousnes and having worrisome thinking however notes overall symptoms have demonstrated some improvement in intensity.  Thoughts:  Denied delusions, hallucinations.  Suicidal thoughts/behaviors:  Patient denied suicidal and homicidal ideation, plan and intent.  Patient noted agreement to call 911/and or present to the ED if he experienced suicidal or homicidal ideation, plan or intent.    Self-injury:  Denied.  Sleep: Patient notes he has been experiencing some sleep difficulty which he relates to needing a new pillow.  Trauma: Patient denies.    Mental Status Exam:  General appearance:  appears stated age, neatly dressed, well groomed  Speech:  normal rate, normal  tone, normal pitch, normal volume  Level of cooperation:  cooperative  Thought processes:  logical, goal-directed  Mood:  euthymic  Thought content: relevant and appropriate  Affect:  appropriate  Orientation:  oriented to person, place, situation and date  Memory/Attention and Concentration:  No gross deficits made evident during conversation.  Judgment and insight: fair  Language:  Intact    PHQ-9: 10  MARCOS-7: 15    Impression: Patient endorses symptoms of depression and anxiety for several years which he notes has significantly exacerbated throughout the last year due to several stressors.  Patient notes since meeting with provider last he has experienced some improvement in symptoms of depression and anxiety.  Patient discuses primary symptoms of anxiety at this time are anticipation anxiety of something going wrong in the future.    Diagnosis:  1. MARCOS (generalized anxiety disorder)     2. Moderate episode of recurrent major depressive disorder       Plan:    1.  Patient provided psychoeducation on anxiety and depression.  2.  Methods of management of episodes of increased anxiety discussed; change of setting, deep breathing/relaxation, distraction.  3. Stress management discussed.  4.  Challenging anxious thinking and identifying thinking errors discussed.  5.  Patient notes he does have some concern over not being able to recognize when or if symptoms of anxiety and depression could be exacerbated.  Patient and provider discussed self-assessment and mood tracking.  Patient noted interest in this and resources for mood tracking discussed.  6.  Patient to follow-up in 3 weeks.    Length of Appointment: 26 min

## 2022-01-31 ENCOUNTER — PATIENT MESSAGE (OUTPATIENT)
Dept: PSYCHIATRY | Facility: CLINIC | Age: 27
End: 2022-01-31
Payer: COMMERCIAL

## 2022-02-04 ENCOUNTER — PATIENT OUTREACH (OUTPATIENT)
Dept: ADMINISTRATIVE | Facility: HOSPITAL | Age: 27
End: 2022-02-04
Payer: COMMERCIAL

## 2022-02-04 ENCOUNTER — PATIENT MESSAGE (OUTPATIENT)
Dept: ADMINISTRATIVE | Facility: HOSPITAL | Age: 27
End: 2022-02-04
Payer: COMMERCIAL

## 2022-02-04 NOTE — PROGRESS NOTES
2022 Care Everywhere updates requested and reviewed.  Immunizations reconciled. Media reports reviewed.  Duplicate HM overrides and  orders removed.  Overdue HM topic chart audit and/or requested.  Overdue lab testing linked to upcoming lab appointments if applies.  Lab [facility], and [facility] reviewed   Lab testing       Health Maintenance Due   Topic Date Due    Hepatitis C Screening  Never done    Pneumococcal Vaccines (Age 0-64) (1 of 2 - PPSV23) Never done    Eye Exam  Never done    HIV Screening  Never done    Foot Exam  2020    Influenza Vaccine (1) 2021    COVID-19 Vaccine (3 - Booster for Pfizer series) 2021    Hemoglobin A1c  2021

## 2022-02-15 ENCOUNTER — PATIENT MESSAGE (OUTPATIENT)
Dept: FAMILY MEDICINE | Facility: CLINIC | Age: 27
End: 2022-02-15
Payer: COMMERCIAL

## 2022-02-15 ENCOUNTER — LAB VISIT (OUTPATIENT)
Dept: LAB | Facility: HOSPITAL | Age: 27
End: 2022-02-15
Attending: INTERNAL MEDICINE
Payer: COMMERCIAL

## 2022-02-15 DIAGNOSIS — E11.9 DIABETES MELLITUS WITHOUT COMPLICATION: ICD-10-CM

## 2022-02-15 DIAGNOSIS — Z00.00 ROUTINE GENERAL MEDICAL EXAMINATION AT A HEALTH CARE FACILITY: ICD-10-CM

## 2022-02-15 LAB
ESTIMATED AVG GLUCOSE: 235 MG/DL (ref 68–131)
HBA1C MFR BLD: 9.8 % (ref 4–5.6)

## 2022-02-15 PROCEDURE — 83036 HEMOGLOBIN GLYCOSYLATED A1C: CPT | Performed by: INTERNAL MEDICINE

## 2022-02-15 PROCEDURE — 86803 HEPATITIS C AB TEST: CPT | Performed by: INTERNAL MEDICINE

## 2022-02-15 PROCEDURE — 36415 COLL VENOUS BLD VENIPUNCTURE: CPT | Mod: PO | Performed by: INTERNAL MEDICINE

## 2022-02-16 LAB — HCV AB SERPL QL IA: NEGATIVE

## 2022-02-17 ENCOUNTER — OFFICE VISIT (OUTPATIENT)
Dept: FAMILY MEDICINE | Facility: CLINIC | Age: 27
End: 2022-02-17
Payer: COMMERCIAL

## 2022-02-17 ENCOUNTER — PATIENT MESSAGE (OUTPATIENT)
Dept: FAMILY MEDICINE | Facility: CLINIC | Age: 27
End: 2022-02-17

## 2022-02-17 ENCOUNTER — PATIENT OUTREACH (OUTPATIENT)
Dept: ADMINISTRATIVE | Facility: HOSPITAL | Age: 27
End: 2022-02-17
Payer: COMMERCIAL

## 2022-02-17 ENCOUNTER — PATIENT MESSAGE (OUTPATIENT)
Dept: ADMINISTRATIVE | Facility: HOSPITAL | Age: 27
End: 2022-02-17
Payer: COMMERCIAL

## 2022-02-17 DIAGNOSIS — E78.5 DYSLIPIDEMIA: ICD-10-CM

## 2022-02-17 DIAGNOSIS — F41.1 GAD (GENERALIZED ANXIETY DISORDER): ICD-10-CM

## 2022-02-17 DIAGNOSIS — Z01.89 ENCOUNTER FOR LABORATORY TEST: ICD-10-CM

## 2022-02-17 DIAGNOSIS — E11.65 TYPE 2 DIABETES MELLITUS WITH HYPERGLYCEMIA, WITHOUT LONG-TERM CURRENT USE OF INSULIN: Primary | ICD-10-CM

## 2022-02-17 DIAGNOSIS — E66.9 OBESITY, UNSPECIFIED CLASSIFICATION, UNSPECIFIED OBESITY TYPE, UNSPECIFIED WHETHER SERIOUS COMORBIDITY PRESENT: ICD-10-CM

## 2022-02-17 DIAGNOSIS — R73.9 HYPERGLYCEMIA: ICD-10-CM

## 2022-02-17 DIAGNOSIS — F33.1 MODERATE EPISODE OF RECURRENT MAJOR DEPRESSIVE DISORDER: ICD-10-CM

## 2022-02-17 DIAGNOSIS — R74.01 TRANSAMINITIS: ICD-10-CM

## 2022-02-17 DIAGNOSIS — E78.2 MIXED HYPERLIPIDEMIA: ICD-10-CM

## 2022-02-17 PROCEDURE — 3046F PR MOST RECENT HEMOGLOBIN A1C LEVEL > 9.0%: ICD-10-PCS | Mod: CPTII,95,, | Performed by: INTERNAL MEDICINE

## 2022-02-17 PROCEDURE — 99214 OFFICE O/P EST MOD 30 MIN: CPT | Mod: 95,,, | Performed by: INTERNAL MEDICINE

## 2022-02-17 PROCEDURE — 99214 PR OFFICE/OUTPT VISIT, EST, LEVL IV, 30-39 MIN: ICD-10-PCS | Mod: 95,,, | Performed by: INTERNAL MEDICINE

## 2022-02-17 PROCEDURE — 1159F PR MEDICATION LIST DOCUMENTED IN MEDICAL RECORD: ICD-10-PCS | Mod: CPTII,95,, | Performed by: INTERNAL MEDICINE

## 2022-02-17 PROCEDURE — 3046F HEMOGLOBIN A1C LEVEL >9.0%: CPT | Mod: CPTII,95,, | Performed by: INTERNAL MEDICINE

## 2022-02-17 PROCEDURE — 1160F PR REVIEW ALL MEDS BY PRESCRIBER/CLIN PHARMACIST DOCUMENTED: ICD-10-PCS | Mod: CPTII,95,, | Performed by: INTERNAL MEDICINE

## 2022-02-17 PROCEDURE — 1160F RVW MEDS BY RX/DR IN RCRD: CPT | Mod: CPTII,95,, | Performed by: INTERNAL MEDICINE

## 2022-02-17 PROCEDURE — 1159F MED LIST DOCD IN RCRD: CPT | Mod: CPTII,95,, | Performed by: INTERNAL MEDICINE

## 2022-02-17 RX ORDER — ORAL SEMAGLUTIDE 3 MG/1
3 TABLET ORAL DAILY
Qty: 30 TABLET | Refills: 2 | Status: SHIPPED | OUTPATIENT
Start: 2022-02-17 | End: 2022-04-12

## 2022-02-17 RX ORDER — ATORVASTATIN CALCIUM 10 MG/1
10 TABLET, FILM COATED ORAL DAILY
Qty: 90 TABLET | Refills: 1 | Status: SHIPPED | OUTPATIENT
Start: 2022-02-17 | End: 2023-12-18

## 2022-02-17 NOTE — LETTER
February 25, 2022    Roberto Carlos Souza  [address] [address] [address]             [facility]  [address]  [facility] [address]  Phone: [phone] Dear Mr. Souza:    We have tried to reach you by [facility] email unsuccessfully.    We were reaching out to schedule your labs that were ordered for you,as well as your follow up.  You should have labs 1 week prior to your follow up which is in 6 weeks.  Please contact the office to schedule at your earliest convenience.      If you have any questions or concerns, please don't hesitate to call.    Sincerely,      Manan Arthur MD

## 2022-02-17 NOTE — Clinical Note
Please have patient schedule a follow-up appointment in 6 weeks with labs after an overnight fast at 5 and half weeks.  Please tell him an after visit summary is available for viewing in his portal

## 2022-02-17 NOTE — PATIENT INSTRUCTIONS
Adhere to his low carb diet better and include exercise regimens to his weekly routine.  Goal is to exercise 5 times a week minimum 30 minutes.  Will be switching his pravastatin to Lipitor/atorvastatin which is the generic to try and improve his cholesterol control.  He also needs to limit fats and dairy products more; exercise will also help this.  Will also have him push fluids more during the daytime as well.  Patient is to check blood sugars during the week and keep a log with a goal of at least twice a day before meals or at sleep.  Regular exercise and better limitation of his carbohydrate intake will help his blood sugars.  Will also start Rybelsus at 3 mg per day in the morning.  Continue Jardiance 25 mg per day and push fluids during the day as well.  Obtain his blood work after an overnight fast 1 week before follow-up.

## 2022-02-18 ENCOUNTER — OFFICE VISIT (OUTPATIENT)
Dept: PSYCHIATRY | Facility: CLINIC | Age: 27
End: 2022-02-18
Payer: COMMERCIAL

## 2022-02-18 DIAGNOSIS — F41.1 GAD (GENERALIZED ANXIETY DISORDER): Primary | ICD-10-CM

## 2022-02-18 DIAGNOSIS — F33.1 MODERATE EPISODE OF RECURRENT MAJOR DEPRESSIVE DISORDER: ICD-10-CM

## 2022-02-18 PROCEDURE — 90832 PR PSYCHOTHERAPY W/PATIENT, 30 MIN: ICD-10-PCS | Mod: 95,,, | Performed by: PSYCHOLOGIST

## 2022-02-18 PROCEDURE — 3046F PR MOST RECENT HEMOGLOBIN A1C LEVEL > 9.0%: ICD-10-PCS | Mod: CPTII,95,, | Performed by: PSYCHOLOGIST

## 2022-02-18 PROCEDURE — 3046F HEMOGLOBIN A1C LEVEL >9.0%: CPT | Mod: CPTII,95,, | Performed by: PSYCHOLOGIST

## 2022-02-18 PROCEDURE — 90832 PSYTX W PT 30 MINUTES: CPT | Mod: 95,,, | Performed by: PSYCHOLOGIST

## 2022-02-18 NOTE — PROGRESS NOTES
Primary Care Behavioral Health: Follow-Up  Patient Name:  Roberto Carlos Souza  Date:  02/18/2022  Site:  [facility]  Referral source:  Manan Arthur MD     The patient location is:  [address] [place] [address]  The patient phone number is: [phone]  Visit type: Virtual visit with synchronous audio and video  Each patient to whom he or she provides medical services by telemedicine is:  (1) informed of the relationship between the physician and patient and the respective role of any other health care provider with respect to management of the patient; and (2) notified that he or she may decline to receive medical services by telemedicine and may withdraw from such care at any time.    Chief complaint/reason for encounter:  Anxiety and Depression    History of present illness:  Mr. Roberto Carlos Souza is a 26 year old /White male referred by for symptoms of depression and anxiety. Patient was seen, examined and chart was reviewed.  Patient presents today noting some ongoing improvement in symptoms of anxiety and depression. Patient notes overall improvement in relationship with spouse. Patient notes he was initially diagnosed with DM II 6-7 years ago and recently decided that he wanted to address this diagnosis and seek treatment.  Patient notes he has been been working with PCP about managing this.  Patient notes he was advised to work on improving diet and exercise. Patient denies any episodes of panic since we talked last.      Past Medical History:   Diagnosis Date    Anxiety     Diabetes mellitus, type 2     2015 dx.    Dyslipidemia     Hypertension     lisinopril in past    Obesity        Current Outpatient Medications:     atorvastatin (LIPITOR) 10 MG tablet, Take 1 tablet (10 mg total) by mouth once daily., Disp: 90 tablet, Rfl: 1    blood sugar diagnostic (CONTOUR NEXT STRIPS) Strp, 1 each by Misc.(Non-Drug; Combo Route) route once daily., Disp: 30 each, Rfl: 11    buPROPion  (WELLBUTRIN XL) 300 MG 24 hr tablet, Take 1 tablet (300 mg total) by mouth once daily., Disp: 90 tablet, Rfl: 3    empagliflozin (JARDIANCE) 25 mg tablet, Take 1 tablet (25 mg total) by mouth once daily., Disp: 90 tablet, Rfl: 1    ferrous sulfate 324 mg (65 mg iron) TbEC, Take 324 mg by mouth once daily., Disp: , Rfl:     lancets (MICROLET LANCET) Misc, 1 each by Misc.(Non-Drug; Combo Route) route 3 (three) times daily., Disp: 100 each, Rfl: 11    lisinopriL 10 MG tablet, TAKE 1 TABLET BY MOUTH EVERY DAY (Patient not taking: Reported on 11/24/2021), Disp: 90 tablet, Rfl: 3    pantoprazole (PROTONIX) 40 MG tablet, Take 1 tablet (40 mg total) by mouth once daily. (Patient not taking: Reported on 11/24/2021), Disp: 30 tablet, Rfl: 2    semaglutide (RYBELSUS) 3 mg tablet, Take 1 tablet (3 mg total) by mouth once daily., Disp: 30 tablet, Rfl: 2    sertraline (ZOLOFT) 50 MG tablet, Take 1 tablet (50 mg total) by mouth once daily., Disp: 90 tablet, Rfl: 0    Psychiatric symptoms:  Depression: Patient notes symptoms of down mood, decreased interest in engaging in tasks, attention/concentration difficulty, feeling bad about himself however notes overall symptoms of depression have demonstrated ongoing improvement.  He denied suicidal ideation.  Josefina/Hypomania:  Denied.  He denied periods of elevated mood or abnormally increased energy or goal-directed activity.  Anxiety:  Patient endorses symptoms of feeling nervousnes and having worrisome thinking however notes overall symptoms have demonstrated ongoing improvement in intensity.  Thoughts:  Denied delusions, hallucinations.  Suicidal thoughts/behaviors:  Patient denied suicidal and homicidal ideation, plan and intent.  Patient noted agreement to call 911/and or present to the ED if he experienced suicidal or homicidal ideation, plan or intent.    Self-injury:  Denied.  Sleep: Patient notes he has been experiencing some sleep difficulty which he attributes to  discomfort at times.  Trauma: Patient denies.    Mental Status Exam:  General appearance:  appears stated age, neatly dressed, well groomed  Speech:  normal rate, normal tone, normal pitch, normal volume  Level of cooperation:  cooperative  Thought processes:  logical, goal-directed  Mood:  euthymic  Thought content: relevant and appropriate  Affect:  appropriate  Orientation:  oriented to person, place, situation and date  Memory/Attention and Concentration:  No gross deficits made evident during conversation.  Judgment and insight: fair  Language:  Intact    Impression: Patient endorses symptoms of depression and anxiety for several years which he notes has significantly exacerbated throughout the last year due to several stressors.  Patient notes since meeting with provider last he has experienced ongoing improvement in symptoms of depression and anxiety.  Patient discuses improved relationship with spouse.  Patient discusses some stress related to recent engagement in health care as he notes he was diagnosed with DM II several years ago however notes he has not addressed this until recently.  Patient notes he has started implementing healthy lifestyle changes.      Diagnosis:  1. MARCOS (generalized anxiety disorder)     2. Moderate episode of recurrent major depressive disorder       Plan:    1.  Patient provided psychoeducation on anxiety and depression.  2.  Methods of management of episodes of increased anxiety discussed; change of setting, deep breathing/relaxation, distraction.  3. Stress management discussed.  4.  Challenging anxious thinking and identifying thinking errors discussed.  5.  Setting small attainable daily and weekly goals for managing health discussed.  6.   Patient to follow-up in 3 weeks.    Length of Appointment: 24 min

## 2022-02-26 NOTE — PROGRESS NOTES
Subjective:      The patient location is:  home  The chief complaint leading to consultation is:  Reassessment and go over labs.    Visit type:  Audiovisual 25855.  Face to Face time with patient:  1123 through 11:42 a.m..  34 minutes of total time spent on the encounter, which includes face to face time and non-face to face time preparing to see the patient (eg, review of tests), Obtaining and/or reviewing separately obtained history, Documenting clinical information in the electronic or other health record, Independently interpreting results (not separately reported) and communicating results to the patient/family/caregiver, or Care coordination (not separately reported).     Each patient to whom he or she provides medical services by telemedicine is:  (1) informed of the relationship between the physician and patient and the respective role of any other health care provider with respect to management of the patient; and (2) notified that he or she may decline to receive medical services by telemedicine and may withdraw from such care at any time.    Notes:  Please see HPI below.     Patient ID: Roberto Carlos Souza is a 26 y.o. male.    Chief Complaint:  Here today for reassessment and lab review.    HPI:  Here today for reassessment and go over his lab work.  Type 2 diabetes mellitus with hyperglycemia, without long-term current use of insulin; on Jardiance 25 mg per day.  Rybelsus filled at 3 mg a day for additional benefit for blood sugar control as well as with weight reduction.  Weight is about the same 250-260 lb.  Stressed the importance of keeping his follow ups.  02/15/2022 A1c worsened to 9.8.  9/28 A1c 9.1 down from 9.9 from 05/14.  Blood sugars have been averaging in the 200s does not checked the much by his account.  Stressed the benefit of exercise in his daily routine as well with regards to helping his blood sugar and weight come off.  Stressed the importance of adhering to low carb low-fat low dairy  diet as well as the importance of regular exercise regimen with limitation of dairy products as well stricter to what he has been doing.  Better control of his anxiety and depression should help his diabetes and eating to be under better control as well.  -     semaglutide (RYBELSUS) 3 mg tablet; Take 1 tablet (3 mg total) by mouth once daily.  Dispense: 30 tablet; Refill: 2  -     Comprehensive Metabolic Panel; Future; Expected date: 02/17/2022  -     Hemoglobin A1C; Future; Expected date: 02/17/2022  Hyperglycemia  -     semaglutide (RYBELSUS) 3 mg tablet; Take 1 tablet (3 mg total) by mouth once daily.  Dispense: 30 tablet; Refill: 2  -     Comprehensive Metabolic Panel; Future; Expected date: 02/17/2022  MARCOS (generalized anxiety disorder): Limit caffeine intake with stress reduction and regular exercise as tolerated.  Continue sertraline 50 mg daily patient is off Prozac but is on Wellbutrin 300 mg XL per day.  Moderate episode of recurrent major depressive disorder; now on sertraline and Wellbutrin; off Prozac.  Starting to do better; regular exercise regiment would help him as well.  Patient has been seeing  Julia Campoverde nurse practitioner in psychiatry and Dr. Nettles for counseling in psychiatry department as well.  Assistance appreciated.  Mixed hyperlipidemia: Maintain low fat high fiber diet, exercise regularly. Weight reduction where indicated.  Lipid profile:  Cholesterol 251/triglyceride 332/HDL 34/.6.  Patient does not drink any alcohol; stressed the importance of low-fat and low dairy diet will stop pravastatin and changed to Lipitor to see if it will help his triglycerides more.  -     atorvastatin (LIPITOR) 10 MG tablet; Take 1 tablet (10 mg total) by mouth once daily.  Dispense: 90 tablet; Refill: 1  -     Comprehensive Metabolic Panel; Future; Expected date: 02/17/2022  -     Lipid Panel; Future; Expected date: 02/17/2022  Dyslipidemia:  As above and increase aerobic activity  -      atorvastatin (LIPITOR) 10 MG tablet; Take 1 tablet (10 mg total) by mouth once daily.  Dispense: 90 tablet; Refill: 1  -     Comprehensive Metabolic Panel; Future; Expected date: 02/17/2022  -     Lipid Panel; Future; Expected date: 02/17/2022  Encounter for lab test:  Lab results reviewed with patient at length in ordered for follow-up  Obesity: .Caloric restriction w regular exercise and weight reduction.  Has taken off 6 lb since 06/30.  Transaminitis:  ALT has improved and is now minimally elevated at 45. Better control of his diabetes and his cholesterol will help this entity as well.  Push fluids during the day to keep well hydrated.  Encounter for lab test:  Labs reviewed and discussed with patient at length in ordered for follow-up.    Review of Systems   Constitutional: Positive for fatigue. Negative for fever and unexpected weight change.   HENT: Negative for congestion, postnasal drip and rhinorrhea.    Respiratory: Negative for cough, chest tightness, shortness of breath and wheezing.    Cardiovascular: Negative for chest pain, palpitations and leg swelling.   Gastrointestinal: Negative for abdominal pain, blood in stool, constipation, diarrhea, nausea and vomiting.   Endocrine: Positive for polydipsia. Negative for polyphagia and polyuria.   Genitourinary: Negative for dysuria and hematuria.   Skin: Negative for pallor.   Neurological: Positive for headaches. Negative for dizziness, tremors, seizures, speech difficulty and weakness.   Psychiatric/Behavioral: Negative for confusion and dysphoric mood. The patient is nervous/anxious.       Objective:        There were no vitals filed for this visit.    BMI Readings from Last 3 Encounters:   11/24/21 33.13 kg/m²   06/30/21 33.89 kg/m²   06/17/21 34.30 kg/m²        Wt Readings from Last 3 Encounters:   11/24/21 0811 113.9 kg (251 lb 1.7 oz)   06/30/21 1546 116.5 kg (256 lb 13.4 oz)   06/17/21 1602 117.9 kg (260 lb)        BP Readings from Last 3 Encounters:    21 138/88   21 126/78   20 100/66        There are no preventive care reminders to display for this patient.     Health Maintenance Due   Topic Date Due    Pneumococcal Vaccines (Age 0-64) (1 of 2 - PPSV23) Never done    Eye Exam  Never done    HIV Screening  Never done    Foot Exam  2020    Influenza Vaccine (1) 2021    COVID-19 Vaccine (3 - Booster for Pfizer series) 10/14/2021         Past Medical History:   Diagnosis Date    Anxiety     Diabetes mellitus, type 2      dx.    Dyslipidemia     Hypertension     lisinopril in past    Obesity        Past Surgical History:   Procedure Laterality Date    LASIK Bilateral 2015    SPINE SURGERY      2 disc removed; herniated disc. Lower back. Left leg crush injury       Social History     Tobacco Use    Smoking status: Former Smoker     Types: Cigars     Quit date:      Years since quittin.1    Smokeless tobacco: Never Used   Substance Use Topics    Alcohol use: Yes     Comment: social; less then one a week    Drug use: No       Family History   Problem Relation Age of Onset    Diabetes Mother     Breast cancer Mother         breast cancer; dx at early 40's    Diabetes Father     Colon cancer Neg Hx        Review of patient's allergies indicates:   Allergen Reactions    Metformin      Discontinued with hematochezia; symptoms markedly improved but still persistent; remain off metformin       Current Outpatient Medications on File Prior to Visit   Medication Sig Dispense Refill    blood sugar diagnostic (CONTOUR NEXT STRIPS) Strp 1 each by Misc.(Non-Drug; Combo Route) route once daily. 30 each 11    buPROPion (WELLBUTRIN XL) 300 MG 24 hr tablet Take 1 tablet (300 mg total) by mouth once daily. 90 tablet 3    empagliflozin (JARDIANCE) 25 mg tablet Take 1 tablet (25 mg total) by mouth once daily. 90 tablet 1    ferrous sulfate 324 mg (65 mg iron) TbEC Take 324 mg by mouth once daily.      lancets  (MICROLET LANCET) Misc 1 each by Misc.(Non-Drug; Combo Route) route 3 (three) times daily. 100 each 11    pantoprazole (PROTONIX) 40 MG tablet Take 1 tablet (40 mg total) by mouth once daily. (Patient not taking: Reported on 11/24/2021) 30 tablet 2    sertraline (ZOLOFT) 50 MG tablet Take 1 tablet (50 mg total) by mouth once daily. 90 tablet 0    [DISCONTINUED] lisinopriL 10 MG tablet TAKE 1 TABLET BY MOUTH EVERY DAY (Patient not taking: Reported on 11/24/2021) 90 tablet 3     No current facility-administered medications on file prior to visit.       Physical Exam  Constitutional:       General: He is not in acute distress.     Appearance: He is well-developed. He is not diaphoretic.      Comments: Answers questions appropriately and with good thought content.   HENT:      Head: Normocephalic and atraumatic.   Pulmonary:      Effort: Pulmonary effort is normal. No respiratory distress.   Musculoskeletal:         General: Normal range of motion.   Neurological:      Mental Status: He is alert and oriented to person, place, and time.   Psychiatric:         Behavior: Behavior normal.         Assessment:       1. Type 2 diabetes mellitus with hyperglycemia, without long-term current use of insulin    2. Hyperglycemia    3. MARCOS (generalized anxiety disorder)    4. Moderate episode of recurrent major depressive disorder    5. Mixed hyperlipidemia    6. Dyslipidemia    7. Transaminitis    8. Encounter for laboratory test    9. Obesity, unspecified classification, unspecified obesity type, unspecified whether serious comorbidity present        Plan:       Type 2 diabetes mellitus with hyperglycemia, without long-term current use of insulin  -     semaglutide (RYBELSUS) 3 mg tablet; Take 1 tablet (3 mg total) by mouth once daily.  Dispense: 30 tablet; Refill: 2  -     Comprehensive Metabolic Panel; Future; Expected date: 02/17/2022  -     Hemoglobin A1C; Future; Expected date: 02/17/2022    Hyperglycemia:  Rybelsus added  to Jardiance should help his blood sugar control as well as starting a regular exercise regimen and better control of his anxiety and depression.  -     semaglutide (RYBELSUS) 3 mg tablet; Take 1 tablet (3 mg total) by mouth once daily.  Dispense: 30 tablet; Refill: 2  -     Comprehensive Metabolic Panel; Future; Expected date: 02/17/2022    MARCOS (generalized anxiety disorder): Limit caffeine intake with stress reduction and regular exercise as tolerated.  Continue sertraline 50 mg daily patient is off Prozac but is on Wellbutrin 300 mg XL per day.    Moderate episode of recurrent major depressive disorder: Starting to do better; regular exercise regiment would help him as well.  Patient has been seeing  Julia Campoverde nurse practitioner in psychiatry and Dr. Nettles for counseling in psychiatry department as well.  Assistance appreciated.  Better control of his anxiety and depression should help his blood sugars come down as well as his weight.    Mixed hyperlipidemia: Maintain low fat high fiber diet, along with stricter her dairy control; exercise regularly. Weight reduction attempts to continue.  To stop pravastatin and start atorvastatin/Lipitor 10 mg daily for better lipid control.  Portion control will help as well  -     atorvastatin (LIPITOR) 10 MG tablet; Take 1 tablet (10 mg total) by mouth once daily.  Dispense: 90 tablet; Refill: 1  -     Comprehensive Metabolic Panel; Future; Expected date: 02/17/2022  -     Lipid Panel; Future; Expected date: 02/17/2022    Dyslipidemia:  Needs to also increase aerobic activity and exercise as well as low-fat high-fiber low dairy diet  -     atorvastatin (LIPITOR) 10 MG tablet; Take 1 tablet (10 mg total) by mouth once daily.  Dispense: 90 tablet; Refill: 1  -     Comprehensive Metabolic Panel; Future; Expected date: 02/17/2022  -     Lipid Panel; Future; Expected date: 02/17/2022    Transaminitis:  ALT minimally elevated now at 45; improved; better diabetic control as  well as reduction of his weight will both help his liver enzymes come down; push fluids during the day.    Obesity:  Low-fat low carb diet as well as portion control and regular exercise will all help his weight come down.    Encounter for lab testing:  Labs reviewed and discussed with patient at length in ordered for follow-up.

## 2022-03-30 DIAGNOSIS — E11.65 TYPE 2 DIABETES MELLITUS WITH HYPERGLYCEMIA, WITHOUT LONG-TERM CURRENT USE OF INSULIN: ICD-10-CM

## 2022-03-30 NOTE — TELEPHONE ENCOUNTER
No new care gaps identified.  Powered by [employer]. Reference number: [id number].   3/30/2022 2:58:18 PM CDT

## 2022-03-31 RX ORDER — EMPAGLIFLOZIN 25 MG/1
25 TABLET, FILM COATED ORAL DAILY
Qty: 90 TABLET | Refills: 0 | Status: SHIPPED | OUTPATIENT
Start: 2022-03-31 | End: 2022-07-20 | Stop reason: SDUPTHER

## 2022-03-31 NOTE — TELEPHONE ENCOUNTER
Refill Authorization Note   Roberto Carlos Souza  is requesting a refill authorization.  Brief Assessment and Rationale for Refill:  Approve     Medication Therapy Plan:       Medication Reconciliation Completed: No   Comments:   --->Care Gap information included below if applicable.       Requested Prescriptions   Pending Prescriptions Disp Refills    JARDIANCE 25 mg tablet [Pharmacy Med Name: JARDIANCE 25 MG          TAB] 90 tablet 0     Sig: TAKE 1 TABLET (25 MG TOTAL) BY MOUTH ONCE DAILY.       Endocrinology:  Diabetes - SGLT2 Inhibitors Failed - 3/31/2022  1:27 PM        Failed - Matches previous order       Previous Authorizing Provider: Manan Arthur MD (empagliflozin (JARDIANCE) 25 mg tablet)  Previous Pharmacy: [id number] - [place], [address]            Passed - Patient is at least 18 years old        Passed - BP > 90/50 mmH     BP Readings from Last 3 Encounters:   05/13/21 126/78   02/20/20 100/66   11/20/19 134/78               Passed - Valid encounter within last 15 months     Recent Visits  Date Type Provider Dept   02/17/22 Office Visit Manan Arthur MD [facility]   05/28/21 Office Visit Manan Arthur MD [facility]   05/13/21 Office Visit Manan Arthur MD [facility]   Showing recent visits within past 720 days and meeting all other requirements  Future Appointments  No visits were found meeting these conditions.  Showing future appointments within next 150 days and meeting all other requirements                Passed - No ED/Hospital visits since last PCP visit     Last PCP Visit: 2/17/2022 Last Admission: 6/18/2021 Last ED Visit:           Passed - Cr is 1.39 or below and within 360 days     Lab Results   Component Value Date    CREATININE 0.8 09/28/2021    CREATININE 0.8 05/14/2021    CREATININE 0.8 03/07/2020              Passed - HBA1C within 180 days     Lab Results   Component Value Date    HGBA1C 9.8 (H) 02/15/2022    HGBA1C 9.1 (H) 09/28/2021     HGBA1C 9.9 (H) 05/14/2021              Passed - eGFR is 45 or above and within 360 days     Lab Results   Component Value Date    EGFRNONAA >60.0 09/28/2021    EGFRNONAA >60.0 05/14/2021    EGFRNONAA >60.0 03/07/2020                    Appointments  past 12m or future 3m with PCP    Date Provider   Last Visit   2/17/2022 Manan Arthur MD   Next Visit   Visit date not found Manan Arhtur MD   ED visits in past 90 days: 0     Note composed:1:28 PM 03/31/2022

## 2022-03-31 NOTE — TELEPHONE ENCOUNTER
Urine for microalbumin/creatinine needed to be put in as home collect so they will collect at lab in Internal med. Routing to Fanta Le NP for signature.  
PROVIDER:[TOKEN:[[id number]]]

## 2022-04-07 ENCOUNTER — PATIENT OUTREACH (OUTPATIENT)
Dept: ADMINISTRATIVE | Facility: HOSPITAL | Age: 27
End: 2022-04-07
Payer: COMMERCIAL

## 2022-04-07 NOTE — PROGRESS NOTES
Non-compliant report chart audits HGBA1C.        Care Everywhere and media, updates requested and reviewed.      [facility] and [facility] reviewed for tests needed.    Outreach to patient    Labs and PCP f/u scheduled    Outreach:  [email or web address]

## 2022-04-09 ENCOUNTER — LAB VISIT (OUTPATIENT)
Dept: LAB | Facility: HOSPITAL | Age: 27
End: 2022-04-09
Attending: INTERNAL MEDICINE
Payer: COMMERCIAL

## 2022-04-09 DIAGNOSIS — R73.9 HYPERGLYCEMIA: ICD-10-CM

## 2022-04-09 DIAGNOSIS — E78.2 MIXED HYPERLIPIDEMIA: ICD-10-CM

## 2022-04-09 DIAGNOSIS — E78.5 DYSLIPIDEMIA: ICD-10-CM

## 2022-04-09 DIAGNOSIS — E11.65 TYPE 2 DIABETES MELLITUS WITH HYPERGLYCEMIA, WITHOUT LONG-TERM CURRENT USE OF INSULIN: ICD-10-CM

## 2022-04-09 LAB
ALBUMIN SERPL BCP-MCNC: 4.1 G/DL (ref 3.5–5.2)
ALP SERPL-CCNC: 77 U/L (ref 55–135)
ALT SERPL W/O P-5'-P-CCNC: 42 U/L (ref 10–44)
ANION GAP SERPL CALC-SCNC: 12 MMOL/L (ref 8–16)
AST SERPL-CCNC: 29 U/L (ref 10–40)
BILIRUB SERPL-MCNC: 0.7 MG/DL (ref 0.1–1)
BUN SERPL-MCNC: 13 MG/DL (ref 6–20)
CALCIUM SERPL-MCNC: 9.7 MG/DL (ref 8.7–10.5)
CHLORIDE SERPL-SCNC: 107 MMOL/L (ref 95–110)
CHOLEST SERPL-MCNC: 177 MG/DL (ref 120–199)
CHOLEST/HDLC SERPL: 5.4 {RATIO} (ref 2–5)
CO2 SERPL-SCNC: 22 MMOL/L (ref 23–29)
CREAT SERPL-MCNC: 0.8 MG/DL (ref 0.5–1.4)
EST. GFR  (AFRICAN AMERICAN): >60 ML/MIN/1.73 M^2
EST. GFR  (NON AFRICAN AMERICAN): >60 ML/MIN/1.73 M^2
ESTIMATED AVG GLUCOSE: 217 MG/DL (ref 68–131)
GLUCOSE SERPL-MCNC: 173 MG/DL (ref 70–110)
HBA1C MFR BLD: 9.2 % (ref 4–5.6)
HDLC SERPL-MCNC: 33 MG/DL (ref 40–75)
HDLC SERPL: 18.6 % (ref 20–50)
LDLC SERPL CALC-MCNC: 91.6 MG/DL (ref 63–159)
NONHDLC SERPL-MCNC: 144 MG/DL
POTASSIUM SERPL-SCNC: 4.1 MMOL/L (ref 3.5–5.1)
PROT SERPL-MCNC: 7.5 G/DL (ref 6–8.4)
SODIUM SERPL-SCNC: 141 MMOL/L (ref 136–145)
TRIGL SERPL-MCNC: 262 MG/DL (ref 30–150)

## 2022-04-09 PROCEDURE — 80061 LIPID PANEL: CPT | Performed by: INTERNAL MEDICINE

## 2022-04-09 PROCEDURE — 36415 COLL VENOUS BLD VENIPUNCTURE: CPT | Mod: PO | Performed by: INTERNAL MEDICINE

## 2022-04-09 PROCEDURE — 83036 HEMOGLOBIN GLYCOSYLATED A1C: CPT | Performed by: INTERNAL MEDICINE

## 2022-04-09 PROCEDURE — 80053 COMPREHEN METABOLIC PANEL: CPT | Performed by: INTERNAL MEDICINE

## 2022-04-11 ENCOUNTER — PATIENT MESSAGE (OUTPATIENT)
Dept: FAMILY MEDICINE | Facility: CLINIC | Age: 27
End: 2022-04-11
Payer: COMMERCIAL

## 2022-04-12 ENCOUNTER — OFFICE VISIT (OUTPATIENT)
Dept: FAMILY MEDICINE | Facility: CLINIC | Age: 27
End: 2022-04-12
Payer: COMMERCIAL

## 2022-04-12 ENCOUNTER — TELEPHONE (OUTPATIENT)
Dept: FAMILY MEDICINE | Facility: CLINIC | Age: 27
End: 2022-04-12

## 2022-04-12 DIAGNOSIS — E78.5 DYSLIPIDEMIA: ICD-10-CM

## 2022-04-12 DIAGNOSIS — K52.9 CHRONIC DIARRHEA: ICD-10-CM

## 2022-04-12 DIAGNOSIS — R74.01 TRANSAMINITIS: ICD-10-CM

## 2022-04-12 DIAGNOSIS — R11.0 NAUSEA: Primary | ICD-10-CM

## 2022-04-12 DIAGNOSIS — R11.0 NAUSEA: ICD-10-CM

## 2022-04-12 DIAGNOSIS — E66.9 CLASS 1 OBESITY WITH BODY MASS INDEX (BMI) OF 32.0 TO 32.9 IN ADULT, UNSPECIFIED OBESITY TYPE, UNSPECIFIED WHETHER SERIOUS COMORBIDITY PRESENT: ICD-10-CM

## 2022-04-12 DIAGNOSIS — K92.2 LGI BLEED: ICD-10-CM

## 2022-04-12 DIAGNOSIS — E78.2 MIXED HYPERLIPIDEMIA: ICD-10-CM

## 2022-04-12 DIAGNOSIS — R12 HEARTBURN: ICD-10-CM

## 2022-04-12 DIAGNOSIS — F41.1 GAD (GENERALIZED ANXIETY DISORDER): ICD-10-CM

## 2022-04-12 DIAGNOSIS — E11.65 TYPE 2 DIABETES MELLITUS WITH HYPERGLYCEMIA, WITHOUT LONG-TERM CURRENT USE OF INSULIN: Primary | ICD-10-CM

## 2022-04-12 DIAGNOSIS — E11.65 TYPE 2 DIABETES MELLITUS WITH HYPERGLYCEMIA, WITHOUT LONG-TERM CURRENT USE OF INSULIN: ICD-10-CM

## 2022-04-12 PROCEDURE — 99443 PR PHYSICIAN TELEPHONE EVALUATION 21-30 MIN: ICD-10-PCS | Mod: 95,,, | Performed by: INTERNAL MEDICINE

## 2022-04-12 PROCEDURE — 3046F PR MOST RECENT HEMOGLOBIN A1C LEVEL > 9.0%: ICD-10-PCS | Mod: CPTII,95,, | Performed by: INTERNAL MEDICINE

## 2022-04-12 PROCEDURE — 99443 PR PHYSICIAN TELEPHONE EVALUATION 21-30 MIN: CPT | Mod: 95,,, | Performed by: INTERNAL MEDICINE

## 2022-04-12 PROCEDURE — 3046F HEMOGLOBIN A1C LEVEL >9.0%: CPT | Mod: CPTII,95,, | Performed by: INTERNAL MEDICINE

## 2022-04-12 RX ORDER — ORAL SEMAGLUTIDE 7 MG/1
7 TABLET ORAL DAILY
Qty: 30 TABLET | Refills: 2 | Status: CANCELLED | OUTPATIENT
Start: 2022-04-12

## 2022-04-12 RX ORDER — ORAL SEMAGLUTIDE 7 MG/1
7 TABLET ORAL DAILY
Qty: 30 TABLET | Refills: 2 | Status: SHIPPED | OUTPATIENT
Start: 2022-04-12 | End: 2022-05-04

## 2022-04-12 RX ORDER — ONDANSETRON 4 MG/1
4 TABLET, FILM COATED ORAL EVERY 6 HOURS PRN
Qty: 15 TABLET | Refills: 1 | Status: SHIPPED | OUTPATIENT
Start: 2022-04-12 | End: 2024-01-23

## 2022-04-12 RX ORDER — PANTOPRAZOLE SODIUM 40 MG/1
TABLET, DELAYED RELEASE ORAL
Qty: 30 TABLET | Refills: 2 | Status: SHIPPED | OUTPATIENT
Start: 2022-04-12 | End: 2022-04-20 | Stop reason: SDUPTHER

## 2022-04-12 NOTE — PROGRESS NOTES
Subjective:      The patient location is:  home  The chief complaint leading to consultation is:  Reassessment    Visit type:  Audiovisual 48788  Face to Face time with patient: ***  *** minutes of total time spent on the encounter, which includes face to face time and non-face to face time preparing to see the patient (eg, review of tests), Obtaining and/or reviewing separately obtained history, Documenting clinical information in the electronic or other health record, Independently interpreting results (not separately reported) and communicating results to the patient/family/caregiver, or Care coordination (not separately reported).     Each patient to whom he or she provides medical services by telemedicine is:  (1) informed of the relationship between the physician and patient and the respective role of any other health care provider with respect to management of the patient; and (2) notified that he or she may decline to receive medical services by telemedicine and may withdraw from such care at any time.    Notes:      Patient ID: Roberto Carlos Souza is a 26 y.o. male.    Chief Complaint: No chief complaint on file.    HPI    Review of Systems   Constitutional: Positive for fatigue. Negative for fever and unexpected weight change.   HENT: Negative for congestion, postnasal drip and rhinorrhea.    Respiratory: Negative for cough, chest tightness, shortness of breath and wheezing.    Cardiovascular: Negative for chest pain, palpitations and leg swelling.   Gastrointestinal: Negative for abdominal pain, blood in stool, constipation, diarrhea, nausea and vomiting.   Endocrine: Positive for polydipsia and polyphagia. Negative for polyuria.   Genitourinary: Negative for dysuria and hematuria.   Skin: Negative for pallor.   Neurological: Positive for dizziness, weakness and headaches. Negative for tremors, seizures and speech difficulty.   Psychiatric/Behavioral: Negative for confusion and dysphoric mood. The patient is  nervous/anxious.       Objective:        There were no vitals filed for this visit.    BMI Readings from Last 3 Encounters:   21 33.13 kg/m²   21 33.89 kg/m²   21 34.30 kg/m²        Wt Readings from Last 3 Encounters:   22 0833 111.8 kg (246 lb 9.4 oz)   21 0811 113.9 kg (251 lb 1.7 oz)   21 1546 116.5 kg (256 lb 13.4 oz)        BP Readings from Last 3 Encounters:   22 126/76   21 138/88   21 126/78        There are no preventive care reminders to display for this patient.     Health Maintenance Due   Topic Date Due    Eye Exam  Never done    HIV Screening  Never done    Foot Exam  2020    COVID-19 Vaccine (3 - Booster for Pfizer series) 10/14/2021    Diabetes Urine Screening  2022         Past Medical History:   Diagnosis Date    Anxiety     Diabetes mellitus, type 2      dx.    Dyslipidemia     Hypertension     lisinopril in past    Obesity        Past Surgical History:   Procedure Laterality Date    LASIK Bilateral 2015    SPINE SURGERY      2 disc removed; herniated disc. Lower back. Left leg crush injury       Social History     Tobacco Use    Smoking status: Former Smoker     Types: Cigars     Quit date: 2018     Years since quittin.3    Smokeless tobacco: Never Used   Substance Use Topics    Alcohol use: Yes     Comment: social; less then one a week    Drug use: No       Family History   Problem Relation Age of Onset    Diabetes Mother     Breast cancer Mother         breast cancer; dx at early 40's    Diabetes Father     Colon cancer Neg Hx        Review of patient's allergies indicates:   Allergen Reactions    Metformin      Discontinued with hematochezia; symptoms markedly improved but still persistent; remain off metformin    Rybelsus [semaglutide] Other (See Comments)     abdominal pain, nausea       Current Outpatient Medications on File Prior to Visit   Medication Sig Dispense Refill    atorvastatin  (LIPITOR) 10 MG tablet Take 1 tablet (10 mg total) by mouth once daily. 90 tablet 1    blood sugar diagnostic (CONTOUR NEXT STRIPS) Strp 1 each by Misc.(Non-Drug; Combo Route) route once daily. 30 each 11    buPROPion (WELLBUTRIN XL) 300 MG 24 hr tablet Take 1 tablet (300 mg total) by mouth once daily. 90 tablet 3    JARDIANCE 25 mg tablet TAKE 1 TABLET (25 MG TOTAL) BY MOUTH ONCE DAILY. 90 tablet 0    lancets (MICROLET LANCET) Misc 1 each by Misc.(Non-Drug; Combo Route) route 3 (three) times daily. 100 each 11    sertraline (ZOLOFT) 50 MG tablet TAKE ONE TABLET BY MOUTH DAILY 90 tablet 0     No current facility-administered medications on file prior to visit.       Physical Exam  Constitutional:       General: He is not in acute distress.     Appearance: He is well-developed. He is not diaphoretic.   HENT:      Head: Normocephalic and atraumatic.   Pulmonary:      Effort: Pulmonary effort is normal. No respiratory distress.   Musculoskeletal:         General: Normal range of motion.   Neurological:      Mental Status: He is alert and oriented to person, place, and time.   Psychiatric:         Behavior: Behavior normal.         Assessment:       1. Type 2 diabetes mellitus with hyperglycemia, without long-term current use of insulin        Plan:       Type 2 diabetes mellitus with hyperglycemia, without long-term current use of insulin

## 2022-04-18 ENCOUNTER — PATIENT MESSAGE (OUTPATIENT)
Dept: FAMILY MEDICINE | Facility: CLINIC | Age: 27
End: 2022-04-18
Payer: COMMERCIAL

## 2022-04-20 ENCOUNTER — OFFICE VISIT (OUTPATIENT)
Dept: FAMILY MEDICINE | Facility: CLINIC | Age: 27
End: 2022-04-20
Payer: COMMERCIAL

## 2022-04-20 ENCOUNTER — LAB VISIT (OUTPATIENT)
Dept: LAB | Facility: HOSPITAL | Age: 27
End: 2022-04-20
Attending: INTERNAL MEDICINE
Payer: COMMERCIAL

## 2022-04-20 VITALS
HEART RATE: 77 BPM | OXYGEN SATURATION: 98 % | SYSTOLIC BLOOD PRESSURE: 126 MMHG | HEIGHT: 73 IN | BODY MASS INDEX: 32.68 KG/M2 | DIASTOLIC BLOOD PRESSURE: 76 MMHG | WEIGHT: 246.56 LBS | RESPIRATION RATE: 18 BRPM

## 2022-04-20 DIAGNOSIS — R11.0 NAUSEA: ICD-10-CM

## 2022-04-20 DIAGNOSIS — R10.9 ABDOMINAL PAIN, UNSPECIFIED ABDOMINAL LOCATION: ICD-10-CM

## 2022-04-20 LAB
ALBUMIN SERPL BCP-MCNC: 3.7 G/DL (ref 3.5–5.2)
ALP SERPL-CCNC: 75 U/L (ref 55–135)
ALT SERPL W/O P-5'-P-CCNC: 34 U/L (ref 10–44)
AMYLASE SERPL-CCNC: 45 U/L (ref 20–110)
ANION GAP SERPL CALC-SCNC: 13 MMOL/L (ref 8–16)
AST SERPL-CCNC: 25 U/L (ref 10–40)
BILIRUB SERPL-MCNC: 0.8 MG/DL (ref 0.1–1)
BUN SERPL-MCNC: 12 MG/DL (ref 6–20)
CALCIUM SERPL-MCNC: 9.6 MG/DL (ref 8.7–10.5)
CHLORIDE SERPL-SCNC: 105 MMOL/L (ref 95–110)
CO2 SERPL-SCNC: 24 MMOL/L (ref 23–29)
CREAT SERPL-MCNC: 0.8 MG/DL (ref 0.5–1.4)
EST. GFR  (AFRICAN AMERICAN): >60 ML/MIN/1.73 M^2
EST. GFR  (NON AFRICAN AMERICAN): >60 ML/MIN/1.73 M^2
GLUCOSE SERPL-MCNC: 134 MG/DL (ref 70–110)
LIPASE SERPL-CCNC: 16 U/L (ref 4–60)
POTASSIUM SERPL-SCNC: 3.9 MMOL/L (ref 3.5–5.1)
PROT SERPL-MCNC: 7 G/DL (ref 6–8.4)
SODIUM SERPL-SCNC: 142 MMOL/L (ref 136–145)

## 2022-04-20 PROCEDURE — 83690 ASSAY OF LIPASE: CPT | Performed by: INTERNAL MEDICINE

## 2022-04-20 PROCEDURE — 1159F MED LIST DOCD IN RCRD: CPT | Mod: CPTII,S$GLB,, | Performed by: INTERNAL MEDICINE

## 2022-04-20 PROCEDURE — 99214 PR OFFICE/OUTPT VISIT, EST, LEVL IV, 30-39 MIN: ICD-10-PCS | Mod: S$GLB,,, | Performed by: INTERNAL MEDICINE

## 2022-04-20 PROCEDURE — 3008F BODY MASS INDEX DOCD: CPT | Mod: CPTII,S$GLB,, | Performed by: INTERNAL MEDICINE

## 2022-04-20 PROCEDURE — 3074F SYST BP LT 130 MM HG: CPT | Mod: CPTII,S$GLB,, | Performed by: INTERNAL MEDICINE

## 2022-04-20 PROCEDURE — 3078F DIAST BP <80 MM HG: CPT | Mod: CPTII,S$GLB,, | Performed by: INTERNAL MEDICINE

## 2022-04-20 PROCEDURE — 80053 COMPREHEN METABOLIC PANEL: CPT | Performed by: INTERNAL MEDICINE

## 2022-04-20 PROCEDURE — 1160F PR REVIEW ALL MEDS BY PRESCRIBER/CLIN PHARMACIST DOCUMENTED: ICD-10-PCS | Mod: CPTII,S$GLB,, | Performed by: INTERNAL MEDICINE

## 2022-04-20 PROCEDURE — 3046F PR MOST RECENT HEMOGLOBIN A1C LEVEL > 9.0%: ICD-10-PCS | Mod: CPTII,S$GLB,, | Performed by: INTERNAL MEDICINE

## 2022-04-20 PROCEDURE — 3008F PR BODY MASS INDEX (BMI) DOCUMENTED: ICD-10-PCS | Mod: CPTII,S$GLB,, | Performed by: INTERNAL MEDICINE

## 2022-04-20 PROCEDURE — 82150 ASSAY OF AMYLASE: CPT | Performed by: INTERNAL MEDICINE

## 2022-04-20 PROCEDURE — 3046F HEMOGLOBIN A1C LEVEL >9.0%: CPT | Mod: CPTII,S$GLB,, | Performed by: INTERNAL MEDICINE

## 2022-04-20 PROCEDURE — 1159F PR MEDICATION LIST DOCUMENTED IN MEDICAL RECORD: ICD-10-PCS | Mod: CPTII,S$GLB,, | Performed by: INTERNAL MEDICINE

## 2022-04-20 PROCEDURE — 99999 PR PBB SHADOW E&M-EST. PATIENT-LVL V: ICD-10-PCS | Mod: PBBFAC,,, | Performed by: INTERNAL MEDICINE

## 2022-04-20 PROCEDURE — 3074F PR MOST RECENT SYSTOLIC BLOOD PRESSURE < 130 MM HG: ICD-10-PCS | Mod: CPTII,S$GLB,, | Performed by: INTERNAL MEDICINE

## 2022-04-20 PROCEDURE — 1160F RVW MEDS BY RX/DR IN RCRD: CPT | Mod: CPTII,S$GLB,, | Performed by: INTERNAL MEDICINE

## 2022-04-20 PROCEDURE — 99214 OFFICE O/P EST MOD 30 MIN: CPT | Mod: S$GLB,,, | Performed by: INTERNAL MEDICINE

## 2022-04-20 PROCEDURE — 36415 COLL VENOUS BLD VENIPUNCTURE: CPT | Mod: PN | Performed by: INTERNAL MEDICINE

## 2022-04-20 PROCEDURE — 99999 PR PBB SHADOW E&M-EST. PATIENT-LVL V: CPT | Mod: PBBFAC,,, | Performed by: INTERNAL MEDICINE

## 2022-04-20 PROCEDURE — 3078F PR MOST RECENT DIASTOLIC BLOOD PRESSURE < 80 MM HG: ICD-10-PCS | Mod: CPTII,S$GLB,, | Performed by: INTERNAL MEDICINE

## 2022-04-20 RX ORDER — PANTOPRAZOLE SODIUM 40 MG/1
40 TABLET, DELAYED RELEASE ORAL 2 TIMES DAILY
Qty: 60 TABLET | Refills: 2 | Status: SHIPPED | OUTPATIENT
Start: 2022-04-20 | End: 2023-02-28

## 2022-04-27 ENCOUNTER — HOSPITAL ENCOUNTER (OUTPATIENT)
Dept: RADIOLOGY | Facility: HOSPITAL | Age: 27
Discharge: HOME | End: 2022-04-27
Attending: INTERNAL MEDICINE
Payer: COMMERCIAL

## 2022-04-27 DIAGNOSIS — R11.0 NAUSEA: ICD-10-CM

## 2022-04-27 DIAGNOSIS — R10.9 ABDOMINAL PAIN, UNSPECIFIED ABDOMINAL LOCATION: ICD-10-CM

## 2022-04-27 PROCEDURE — 76705 US ABDOMEN LIMITED: ICD-10-PCS | Mod: 26,,, | Performed by: RADIOLOGY

## 2022-04-27 PROCEDURE — 76705 ECHO EXAM OF ABDOMEN: CPT | Mod: TC,PO

## 2022-04-27 PROCEDURE — 76705 ECHO EXAM OF ABDOMEN: CPT | Mod: 26,,, | Performed by: RADIOLOGY

## 2022-05-04 ENCOUNTER — PATIENT MESSAGE (OUTPATIENT)
Dept: FAMILY MEDICINE | Facility: CLINIC | Age: 27
End: 2022-05-04
Payer: COMMERCIAL

## 2022-05-04 DIAGNOSIS — R93.89 ABNORMAL ULTRASOUND: ICD-10-CM

## 2022-05-04 DIAGNOSIS — E11.65 TYPE 2 DIABETES MELLITUS WITH HYPERGLYCEMIA, WITHOUT LONG-TERM CURRENT USE OF INSULIN: Primary | ICD-10-CM

## 2022-05-04 RX ORDER — GLIMEPIRIDE 2 MG/1
2 TABLET ORAL
Qty: 30 TABLET | Refills: 0 | Status: SHIPPED | OUTPATIENT
Start: 2022-05-04 | End: 2022-07-20 | Stop reason: SDUPTHER

## 2022-05-04 NOTE — TELEPHONE ENCOUNTER
Spoke with patient about US and lab results.    Symptoms totally resolved with stopping the rybelsus. Never really took protonix BID so unlikely that this caused the change in symptoms. Will stay off of rybelsus for now. Start glimepiride to see if this is tolerated prior to planned PCP apt at the end of this month.     Regarding US, there is evidence of fatty infiltration of liver as well as splenomegaly and possible portal HTN. Recommended consultation with hepatology. LFTs previously elevated, but more recently improved along with better DM control. Does not really drink, but advised avoidance of alcohol for now. Likely BERNARDO.

## 2022-05-06 ENCOUNTER — PATIENT OUTREACH (OUTPATIENT)
Dept: ADMINISTRATIVE | Facility: HOSPITAL | Age: 27
End: 2022-05-06
Payer: COMMERCIAL

## 2022-05-06 DIAGNOSIS — E11.9 DIABETES MELLITUS WITHOUT COMPLICATION: Primary | ICD-10-CM

## 2022-05-06 NOTE — PROGRESS NOTES
Non-compliant report chart audits HGBA1C.        Care Everywhere and media, updates requested and reviewed.      [facility] and [facility] reviewed for tests needed.    Outreach to patient    Scheduled    Outreach:  [facility] [place]

## 2022-05-16 ENCOUNTER — PATIENT MESSAGE (OUTPATIENT)
Dept: ADMINISTRATIVE | Facility: HOSPITAL | Age: 27
End: 2022-05-16
Payer: COMMERCIAL

## 2022-05-16 ENCOUNTER — PATIENT OUTREACH (OUTPATIENT)
Dept: ADMINISTRATIVE | Facility: HOSPITAL | Age: 27
End: 2022-05-16
Payer: COMMERCIAL

## 2022-05-16 NOTE — PROGRESS NOTES
2022 Care Everywhere updates requested and reviewed.  Immunizations reconciled. Media reports reviewed.  Duplicate HM overrides and  orders removed.  Overdue HM topic chart audit and/or requested.  Overdue lab testing linked to upcoming lab appointments if applies.  Lab [facility], and [place] reviewed   Lab testing       Health Maintenance Due   Topic Date Due    Eye Exam  Never done    HIV Screening  Never done    Foot Exam  2020    COVID-19 Vaccine (3 - Booster for Pfizer series) 10/14/2021    Diabetes Urine Screening  2022

## 2022-05-18 ENCOUNTER — TELEPHONE (OUTPATIENT)
Dept: FAMILY MEDICINE | Facility: CLINIC | Age: 27
End: 2022-05-18

## 2022-05-19 NOTE — PROGRESS NOTES
Subjective:    The patient location is: home  The chief complaint leading to consultation is: reassessment    Visit type:audio only; audiovisual failed    Face to Face time with patient:  6:15 p.m. through 6:40 p.m. 25 minutes of total time spent on the encounter, which includes face to face time and non-face to face time preparing to see the patient (eg, review of tests), Obtaining and/or reviewing separately obtained history, Documenting clinical information in the electronic or other health record, Independently interpreting results (not separately reported) and communicating results to the patient/family/caregiver, or Care coordination (not separately reported).     Each patient to whom he or she provides medical services by telemedicine is:  (1) informed of the relationship between the physician and patient and the respective role of any other health care provider with respect to management of the patient; and (2) notified that he or she may decline to receive medical services by telemedicine and may withdraw from such care at any time.    Notes:  Audio only      Patient ID: Roberto Carlos Souza is a 26 y.o. male.    Chief Complaint: No chief complaint on file.    HPI:  Here today for reassessment and go over labs.       Diabetes mellitus type 2 with hyperglycemia without long-term current use of insulin:  Some nausea lately thinks he may have a GI bug Zofran to be used as needed for nausea and pantoprazole renewed some nausea lately.  Hemoglobin A1c has improved slightly from 9.8 on 215-9.2 3 days ago.  Fasting blood sugars 173 on 04/09 and averaging 154 at home.  Will increase Rybelsus from 3 mg a day to 7 mg a day.  Continue Jardiance 25 mg daily and glimepiride 2 mg in the morning before breakfast     Mixed hyperlipidemia/dyslipidemia:  Low-fat high-fiber diet has been recommended along with regular exercise and continued attempts at weight reduction.  Patient tolerates atorvastatin 10 mg fine.  Lipid profile:   Cholesterol 177/triglyceride 262/HDL 33/LDL 91.6.     General anxiety disorder:  He is on sertraline 50 mg daily, and Wellbutrin 300 mg 24 hour tablet 1 a day     Transaminitis:  AST has been remaining normal; ALT went from minimally elevated at 45 to normal at 42.      Encounter for lab test:  Labs reviewed and discussed with patient at length and ordered for follow-up    Review of Systems   Constitutional: Positive for fatigue. Negative for fever and unexpected weight change.   HENT: Negative for congestion, postnasal drip and rhinorrhea.    Respiratory: Negative for cough, chest tightness, shortness of breath and wheezing.    Cardiovascular: Negative for chest pain, palpitations and leg swelling.   Gastrointestinal: Negative for abdominal pain, blood in stool, constipation, diarrhea, nausea and vomiting.   Endocrine: Positive for polydipsia and polyphagia. Negative for polyuria.   Genitourinary: Negative for dysuria and hematuria.   Skin: Negative for pallor.   Neurological: Negative for dizziness, tremors, seizures, speech difficulty, weakness and headaches.   Psychiatric/Behavioral: Negative for confusion and dysphoric mood. The patient is nervous/anxious.       Objective:        There were no vitals filed for this visit.    BMI Readings from Last 3 Encounters:   04/20/22 32.53 kg/m²   11/24/21 33.13 kg/m²   06/30/21 33.89 kg/m²        Wt Readings from Last 3 Encounters:   04/20/22 0833 111.8 kg (246 lb 9.4 oz)   11/24/21 0811 113.9 kg (251 lb 1.7 oz)   06/30/21 1546 116.5 kg (256 lb 13.4 oz)        BP Readings from Last 3 Encounters:   04/20/22 126/76   11/24/21 138/88   05/13/21 126/78        There are no preventive care reminders to display for this patient.     Health Maintenance Due   Topic Date Due    Eye Exam  Never done    HIV Screening  Never done    Foot Exam  11/20/2020    COVID-19 Vaccine (3 - Booster for Pfizer series) 10/14/2021    Diabetes Urine Screening  05/14/2022         Past Medical  History:   Diagnosis Date    Anxiety     Diabetes mellitus, type 2     2015 dx.    Dyslipidemia     Hypertension     lisinopril in past    Obesity        Past Surgical History:   Procedure Laterality Date    LASIK Bilateral 2015    SPINE SURGERY      2 disc removed; herniated disc. Lower back. Left leg crush injury       Social History     Tobacco Use    Smoking status: Former Smoker     Types: Cigars     Quit date:      Years since quittin.3    Smokeless tobacco: Never Used   Substance Use Topics    Alcohol use: Yes     Comment: social; less then one a week    Drug use: No       Family History   Problem Relation Age of Onset    Diabetes Mother     Breast cancer Mother         breast cancer; dx at early 40's    Diabetes Father     Colon cancer Neg Hx        Review of patient's allergies indicates:   Allergen Reactions    Metformin      Discontinued with hematochezia; symptoms markedly improved but still persistent; remain off metformin    Rybelsus [semaglutide] Other (See Comments)     abdominal pain, nausea       Current Outpatient Medications on File Prior to Visit   Medication Sig Dispense Refill    atorvastatin (LIPITOR) 10 MG tablet Take 1 tablet (10 mg total) by mouth once daily. 90 tablet 1    blood sugar diagnostic (CONTOUR NEXT STRIPS) Strp 1 each by Misc.(Non-Drug; Combo Route) route once daily. 30 each 11    buPROPion (WELLBUTRIN XL) 300 MG 24 hr tablet Take 1 tablet (300 mg total) by mouth once daily. 90 tablet 3    JARDIANCE 25 mg tablet TAKE 1 TABLET (25 MG TOTAL) BY MOUTH ONCE DAILY. 90 tablet 0    lancets (MICROLET LANCET) Misc 1 each by Misc.(Non-Drug; Combo Route) route 3 (three) times daily. 100 each 11    sertraline (ZOLOFT) 50 MG tablet TAKE ONE TABLET BY MOUTH DAILY 90 tablet 0     No current facility-administered medications on file prior to visit.       Physical Exam  Constitutional:       General: He is not in acute distress.     Appearance: He is  well-developed. He is not diaphoretic.      Comments: Patient answers questions appropriately and with good thought content   HENT:      Head: Normocephalic and atraumatic.   Pulmonary:      Effort: Pulmonary effort is normal. No respiratory distress.      Comments: No signs of any respiratory difficulty with his speech  Musculoskeletal:         General: Normal range of motion.   Neurological:      Mental Status: He is alert and oriented to person, place, and time.   Psychiatric:         Behavior: Behavior normal.         Assessment:       1. Type 2 diabetes mellitus with hyperglycemia, without long-term current use of insulin    2. Mixed hyperlipidemia    3. Dyslipidemia    4. MARCOS (generalized anxiety disorder)    5. Class 1 obesity with body mass index (BMI) of 32.0 to 32.9 in adult, unspecified obesity type, unspecified whether serious comorbidity present    6. Transaminitis    7. Nausea        Plan:       Type 2 diabetes mellitus with hyperglycemia, without long-term current use of insulin: Maintain a low carb diet; monitor CBG's at home; keep a log for review.  Will try to increase Rybelsus from 3 to 7 mg a day.  Needs to resume home blood sugar monitoring and keep a log for office review.  Try and adhere to a low carb diet better.  Hemoglobin A1c has improved from 9.8 on 02/15 to 9.2 at 3 days ago.    Mixed hyperlipidemia: Maintain low fat high fiber diet, exercise regularly. Weight reduction where indicated.  Continue atorvastatin 10 mg a day but try to limit dairy products more with triglyceride 262 but has improved from 332. Exercise regularly to increase aerobic activity with HDL reduced at 33.     Dyslipidemia:  HDL 33 with goal greater than 45. Regular exercise activity will help    MARCOS (generalized anxiety disorder):  Seems to be stable with medications.    Class 1 obesity with body mass index (BMI) of 32.0 to 32.9 in adult, unspecified obesity type, unspecified whether serious comorbidity present:  Caloric restriction w regular exercise and weight reduction.    Transaminitis:  No alcohol intake.  Push fluids during the day.  ALT minimally elevated at 45 has now normalized to 42.  AST remains normal    Nausea:  Suspected due to viral etiology with a GI bug lately in the community.  Have prescribed Zofran 4 mg every 6-8 hours as needed for nausea vomiting and pantoprazole has been renewed at 40 mg daily.  Push fluids during the day. Call if unimproved.

## 2022-05-19 NOTE — PATIENT INSTRUCTIONS
Type 2 diabetes mellitus with hyperglycemia, without long-term current use of insulin: Maintain a low carb diet; monitor CBG's at home; keep a log for review.  Will try to increase Rybelsus from 3 to 7 mg a day.  Needs to resume home blood sugar monitoring and keep a log for office review.  Try and adhere to a low carb diet better.  Hemoglobin A1c has improved from 9.8 on 02/15 to 9.2 at 3 days ago.    Mixed hyperlipidemia: Maintain low fat high fiber diet, exercise regularly. Weight reduction where indicated.  Continue atorvastatin 10 mg a day but try to limit dairy products more with triglyceride 262 but has improved from 332. Exercise regularly to increase aerobic activity with HDL reduced at 33.     Dyslipidemia:  HDL 33 with goal greater than 45. Regular exercise activity will help    MARCOS (generalized anxiety disorder):  Seems to be stable with medications.    Class 1 obesity with body mass index (BMI) of 32.0 to 32.9 in adult, unspecified obesity type, unspecified whether serious comorbidity present: Caloric restriction w regular exercise and weight reduction.    Transaminitis:  No alcohol intake.  Push fluids during the day.  ALT minimally elevated at 45 has now normalized to 42.  AST remains normal    Nausea:  Suspected due to viral etiology with a GI bug lately in the community.  Have prescribed Zofran 4 mg every 6-8 hours as needed for nausea vomiting and pantoprazole has been renewed at 40 mg daily.  Call if unimproved.

## 2022-05-31 ENCOUNTER — PATIENT MESSAGE (OUTPATIENT)
Dept: ADMINISTRATIVE | Facility: HOSPITAL | Age: 27
End: 2022-05-31
Payer: COMMERCIAL

## 2022-06-01 ENCOUNTER — PATIENT OUTREACH (OUTPATIENT)
Dept: ADMINISTRATIVE | Facility: HOSPITAL | Age: 27
End: 2022-06-01
Payer: COMMERCIAL

## 2022-06-06 ENCOUNTER — PATIENT OUTREACH (OUTPATIENT)
Dept: ADMINISTRATIVE | Facility: HOSPITAL | Age: 27
End: 2022-06-06
Payer: COMMERCIAL

## 2022-06-06 DIAGNOSIS — E11.9 DIABETES MELLITUS WITHOUT COMPLICATION: Primary | ICD-10-CM

## 2022-06-06 NOTE — PROGRESS NOTES
Non-compliant report chart audits HGBA1C.        Care Everywhere and media, updates requested and reviewed.      [facility] reviewed for tests needed.    Outreach to patient    Scheduled  7/14/2022    Outreach:  [facility]

## 2022-06-06 NOTE — PROGRESS NOTES
Non-compliant report chart audits DIABETIC EYE EXAM.   Chart review completed for HM test overdue (mammograms, Colonoscopies, pap smears, DM labs, and/or EYE EXAMs)      Care Everywhere and media, updates requested and reviewed.      RE:  Patient needs diabetic eye exam.    Outreach to patient.      Pt scheduled      Outreach:  Diabetic eye exam

## 2022-06-08 ENCOUNTER — OFFICE VISIT (OUTPATIENT)
Dept: OPTOMETRY | Facility: CLINIC | Age: 27
End: 2022-06-08
Payer: COMMERCIAL

## 2022-06-08 DIAGNOSIS — E11.9 DIABETES MELLITUS WITHOUT COMPLICATION: ICD-10-CM

## 2022-06-08 DIAGNOSIS — Z98.890 S/P LASIK SURGERY OF BOTH EYES: ICD-10-CM

## 2022-06-08 DIAGNOSIS — E11.9 DIABETES MELLITUS TYPE 2 WITHOUT RETINOPATHY: Primary | ICD-10-CM

## 2022-06-08 DIAGNOSIS — H43.393 VITREOUS FLOATERS, BILATERAL: ICD-10-CM

## 2022-06-08 DIAGNOSIS — Z13.5 GLAUCOMA SCREENING: ICD-10-CM

## 2022-06-08 PROCEDURE — 99999 PR PBB SHADOW E&M-EST. PATIENT-LVL III: ICD-10-PCS | Mod: PBBFAC,,, | Performed by: OPTOMETRIST

## 2022-06-08 PROCEDURE — 92004 PR EYE EXAM, NEW PATIENT,COMPREHESV: ICD-10-PCS | Mod: S$GLB,,, | Performed by: OPTOMETRIST

## 2022-06-08 PROCEDURE — 99999 PR PBB SHADOW E&M-EST. PATIENT-LVL III: CPT | Mod: PBBFAC,,, | Performed by: OPTOMETRIST

## 2022-06-08 PROCEDURE — 92004 COMPRE OPH EXAM NEW PT 1/>: CPT | Mod: S$GLB,,, | Performed by: OPTOMETRIST

## 2022-06-08 PROCEDURE — 99213 OFFICE O/P EST LOW 20 MIN: CPT | Mod: PBBFAC,PO | Performed by: OPTOMETRIST

## 2022-06-08 NOTE — PATIENT INSTRUCTIONS
"DRY EYES -- BURNING OR GABY SYMPTOMS:  Use Over The Counter artificial tears as needed for dry eye symptoms.   Some common brands include:  Systane, Optive, Refresh, and Thera-Tears.  These drops can be used as frequently as desired, but may be most helpful use during long periods of concentrated work.  For example, reading / working at the computer. Start with 3-4x per day.     Nighttime Ophthalmic gel or ointments are available: Refresh PM, Genteal, and Lacrilube.    Avoid drops that "get redness out" (Visine, Murine, Clear Eyes), as these may contain medication that could further irritate the eyes, especially with chronic use.    ALLERGY EYES -- ITCHING SYMPTOMS:  Over the counter medications include--Pataday, Zaditor, and Alaway.  Use as directed 1-2 drops daily for symptoms of itching / watering eyes.  These drops will not help for dry eye or exposure symptoms.    REDNESS RELIEF:  Lumify---is a good redness reliever that will not cause irritation if used chronically.        DIABETES AND THE EYE / DIABETIC RETINOPATHY    Diabetic retinopathy is a condition occurring in persons with diabetes, which causes progressive damage to the retina, the light sensitive lining at the back of the eye. It is a serious sight-threatening complication of diabetes.    Diabetic retinopathy is the result of damage to the tiny blood vessels that nourish the retina. They leak blood and other fluids that cause swelling of retinal tissue and clouding of vision. The condition usually affects both eyes. The longer a person has diabetes, the more likely they will develop diabetic retinopathy. If left untreated, diabetic retinopathy can cause blindness.  There are two basic types of diabetic retinopathy:    Background or nonproliferative diabetic retinopathy (NPDR)  Nonproliferative diabetic retinopathy (NPDR) is the earliest stage of diabetic retinopathy. With this condition, damaged blood vessels in the retina begin to leak extra fluid " and small amounts of blood into the eye. Sometimes, deposits of cholesterol or other fats from the blood may leak into the retina. Many people with diabetes have mild NPDR, which usually does not affect their vision. However, if their vision is affected, it is the result of macular edema and macular ischemia.    If vision is affected due to macular changes, a consult with a Retina Specialist may be advised.  This is an ophthalmologist that treats retina conditions, including diabetic retinopathy.     Proliferative diabetic retinopathy (PDR)  Proliferative diabetic retinopathy (PDR) mainly occurs when many of the blood vessels in the retina close, preventing enough blood flow. In an attempt to supply blood to the area where the original vessels closed, the retina responds by growing new blood vessels. This is called neovascularization. However, these new blood vessels are abnormal and do not supply the retina with proper blood flow. The new vessels are also often accompanied by scar tissue that may cause the retina to wrinkle or detach. PDR may cause more severe vision loss than NPDR because it can affect both central and peripheral vision.     A patient diagnosed with proliferative diabetic eye disease will be referred to a retinal specialist for consultation.    Often there are no visual symptoms in the early stages of diabetic retinopathy. That is why our eye care professionals recommend that everyone with diabetes have a comprehensive dilated eye examination once a year. Early detection and treatment can limit the potential for significant vision loss from diabetic retinopathy.       FLASHES / FLOATERS / POSTERIOR VITREOUS DETACHMENT    Call the clinic if you have any further changes in symptoms.  Including:  Increased numbers of floaters or flashing lights, dimness or darkness that moves through or stays constant in your vision, or any pain in the eye (s).    You may sometimes see small specks or clouds moving  in your field of vision.  They are called FLOATERS.  You can often see them when looking at a plain background, like a blank wall or blue johnny.  Floaters are actually tiny clumps of gel or cells inside the VITREOUS, the clear jelly-like fluid that fills the inside of your eye.    While these objects look like they are in front of your eye, they are actually floating inside.  What you see are the shadows they cast on the RETINA, the nerve layer at the back of the eye that senses light and allows you to see.      POSTERIOR VITREOUS DETACHMENT    The appearance of new floaters may be alarming.  If you suddenly develop new floaters, you should contact your eye care professional  right away.    The retina can tear if the shrinking vitreous pulls away from the wall of the eye.  This sometimes causes a small amount of bleeding in the eye that may appear as new floaters.    A torn retina is always a serious problem, since it can lead to a retinal detachment.  You should see your eye care professional as soon as possible if:    even one new floater appears suddenly;  you see sudden flashes of light;  you notice other symptoms, like the loss of side vision, or a curtain closes down in your vision        POSTERIOR VITREOUS DETACHMENT is more common for people who:    are nearsighted;  have had cataract surgery;  have had YAG laser surgery of the eye;  have had inflammation inside the eye;  are over age 60.      While some floaters may remain visible, many of them will fade over time and become less noticeable.  Even if you've had some floaters for years, you should have your eyes checked as soon as possible if you notice new ones.    FLASHING LIGHTS    When the vitreous gel rubs or pulls on the retina, you may see what look like flashing lights or lightning streaks.  These flashes can appear off and on for several weeks or months.      Some people experience flashes of light that appear as jagged lines or heat waves in both  eyes, lasting 10-20 minutes.  These flashes are caused by a spasm of blood vessels in the brain, which is called a migraine.    If a headache follows these flashes, it's called a migraine headache.  If   no headache occurs, these flashes are called Ophthalmic or Ocular Migraine.

## 2022-06-08 NOTE — PROGRESS NOTES
HPI     Routine dm eye exam-dle-many years    Pt denies any blurred vision. Hx of lasik. No flashes or floaters. BSL   fluctuates-getting back under control.     Agree above   DM 1.5? Pt unsure ~ 5-7 yrs   No retinopathy noted   Lasik ---6-7 yrs w/ Paz      Last edited by ZORAIDA Beck, OD on 6/8/2022  5:04 PM. (History)        ROS     Positive for: Eyes    Negative for: Constitutional, Gastrointestinal, Neurological, Skin,   Genitourinary, Musculoskeletal, HENT, Endocrine, Cardiovascular,   Respiratory, Psychiatric, Allergic/Imm, Heme/Lymph    Last edited by ZORAIDA Beck, OD on 6/8/2022  4:52 PM. (History)        Assessment /Plan     For exam results, see Encounter Report.    Diabetes mellitus type 2 without retinopathy    Diabetes mellitus without complication  -     Ambulatory referral/consult to Optometry    Vitreous floaters, bilateral    Glaucoma screening    S/P LASIK surgery of both eyes      1,2. No wily/ retinopathy, no csme, gave Diabetic Retinopathy info, control glucose and BP  Advise annual DFE  3. Mild OU, RD precautions given and reviewed. Patient knows to call/ message if any further changes in symptoms occur.  High myopia and astigmatism prior to lasik---precautions   4. Not suspect  5. Stable OU ~ 7 yrs + with minimal residual Rx     Continue uncorrected vision    Discussed and educated patient on current findings /plan.  RTC 1 year, prn if any changes / issues

## 2022-07-05 ENCOUNTER — PATIENT MESSAGE (OUTPATIENT)
Dept: FAMILY MEDICINE | Facility: CLINIC | Age: 27
End: 2022-07-05
Payer: COMMERCIAL

## 2022-07-05 ENCOUNTER — LAB VISIT (OUTPATIENT)
Dept: LAB | Facility: HOSPITAL | Age: 27
End: 2022-07-05
Attending: INTERNAL MEDICINE
Payer: COMMERCIAL

## 2022-07-05 DIAGNOSIS — E11.9 DIABETES MELLITUS WITHOUT COMPLICATION: ICD-10-CM

## 2022-07-05 LAB
ESTIMATED AVG GLUCOSE: 255 MG/DL (ref 68–131)
HBA1C MFR BLD: 10.5 % (ref 4–5.6)

## 2022-07-05 PROCEDURE — 36415 COLL VENOUS BLD VENIPUNCTURE: CPT | Mod: PN | Performed by: INTERNAL MEDICINE

## 2022-07-05 PROCEDURE — 83036 HEMOGLOBIN GLYCOSYLATED A1C: CPT | Performed by: INTERNAL MEDICINE

## 2022-07-08 ENCOUNTER — OFFICE VISIT (OUTPATIENT)
Dept: FAMILY MEDICINE | Facility: CLINIC | Age: 27
End: 2022-07-08
Payer: COMMERCIAL

## 2022-07-08 VITALS — BODY MASS INDEX: 32.98 KG/M2 | WEIGHT: 250 LBS

## 2022-07-08 DIAGNOSIS — Z01.89 ENCOUNTER FOR LABORATORY TEST: ICD-10-CM

## 2022-07-08 DIAGNOSIS — T88.7XXA SIDE EFFECT OF MEDICATION: ICD-10-CM

## 2022-07-08 DIAGNOSIS — R93.5 ABNORMAL ABDOMINAL ULTRASOUND: ICD-10-CM

## 2022-07-08 DIAGNOSIS — R10.84 DIFFUSE ABDOMINAL PAIN: ICD-10-CM

## 2022-07-08 DIAGNOSIS — E11.65 TYPE 2 DIABETES MELLITUS WITH HYPERGLYCEMIA, WITHOUT LONG-TERM CURRENT USE OF INSULIN: Primary | ICD-10-CM

## 2022-07-08 DIAGNOSIS — F41.8 ANXIETY WITH DEPRESSION: ICD-10-CM

## 2022-07-08 DIAGNOSIS — Z91.119 DIETARY NONCOMPLIANCE: ICD-10-CM

## 2022-07-08 DIAGNOSIS — E66.9 OBESITY (BMI 30-39.9): ICD-10-CM

## 2022-07-08 PROCEDURE — 3008F PR BODY MASS INDEX (BMI) DOCUMENTED: ICD-10-PCS | Mod: CPTII,95,, | Performed by: INTERNAL MEDICINE

## 2022-07-08 PROCEDURE — 1160F PR REVIEW ALL MEDS BY PRESCRIBER/CLIN PHARMACIST DOCUMENTED: ICD-10-PCS | Mod: CPTII,95,, | Performed by: INTERNAL MEDICINE

## 2022-07-08 PROCEDURE — 3046F HEMOGLOBIN A1C LEVEL >9.0%: CPT | Mod: CPTII,95,, | Performed by: INTERNAL MEDICINE

## 2022-07-08 PROCEDURE — 3046F PR MOST RECENT HEMOGLOBIN A1C LEVEL > 9.0%: ICD-10-PCS | Mod: CPTII,95,, | Performed by: INTERNAL MEDICINE

## 2022-07-08 PROCEDURE — 3008F BODY MASS INDEX DOCD: CPT | Mod: CPTII,95,, | Performed by: INTERNAL MEDICINE

## 2022-07-08 PROCEDURE — 99214 OFFICE O/P EST MOD 30 MIN: CPT | Mod: 95,,, | Performed by: INTERNAL MEDICINE

## 2022-07-08 PROCEDURE — 99214 PR OFFICE/OUTPT VISIT, EST, LEVL IV, 30-39 MIN: ICD-10-PCS | Mod: 95,,, | Performed by: INTERNAL MEDICINE

## 2022-07-08 PROCEDURE — 1159F PR MEDICATION LIST DOCUMENTED IN MEDICAL RECORD: ICD-10-PCS | Mod: CPTII,95,, | Performed by: INTERNAL MEDICINE

## 2022-07-08 PROCEDURE — 1160F RVW MEDS BY RX/DR IN RCRD: CPT | Mod: CPTII,95,, | Performed by: INTERNAL MEDICINE

## 2022-07-08 PROCEDURE — 1159F MED LIST DOCD IN RCRD: CPT | Mod: CPTII,95,, | Performed by: INTERNAL MEDICINE

## 2022-07-08 RX ORDER — SERTRALINE HYDROCHLORIDE 100 MG/1
100 TABLET, FILM COATED ORAL DAILY
Qty: 30 TABLET | Refills: 2 | Status: SHIPPED | OUTPATIENT
Start: 2022-07-08 | End: 2022-08-11 | Stop reason: SDUPTHER

## 2022-07-08 RX ORDER — BUSPIRONE HYDROCHLORIDE 5 MG/1
TABLET ORAL
Qty: 30 TABLET | Refills: 1 | Status: SHIPPED | OUTPATIENT
Start: 2022-07-08 | End: 2023-07-25

## 2022-07-08 NOTE — Clinical Note
Please call patient and ask him to try and schedule a follow-up appointment sometime around 08/05/2022 as well as labs obtained 1 week prior as a BMP and hemoglobin A1c.  These have been ordered.  Follow-up appointment can be as a virtual or in the office.  Thank you

## 2022-07-08 NOTE — PATIENT INSTRUCTIONS
Type 2 diabetes mellitus with hyperglycemia, without long-term current use of insulin:  Patient is completely gone off his diet and knows the importance of getting back on low carb diet as well as regular exercise which also will help his blood sugar control.  Blood sugars have been 250+ with them being 260 this morning.  His hemoglobin A1c is up to 10.5 from 9.2.  And he has gained a little weight at 250 lb.  He admits to eating more food will with a good appetite.   To take glimepiride 2 mg 1 and half tabs or 3 mg with each breakfast.  If after 7 days his blood sugars still greater than 150 he is to increase the glimepiride to 4 mg every morning.  For this evening only at dinner time if his blood sugars greater than 150, he is to take a half of a 2 mg tablet of glimepiride.  Patient to continue Jardiance 25 mg every morning.  To remain off Rybelsus; his abdominal diffuse discomfort has cleared completely off the medication  -     Basic Metabolic Panel; Future; Expected date: 07/08/2022    Dietary noncompliance:  Has been completely off his diabetic diet.  Have expressed the importance of being back on a low carb diet as well as regular exercise for getting his blood sugar back under control    Anxiety with depression:  Depression has been doing well but feels like his if he is under increased anxiety.  Will increase his sertraline from  mg per day.  Patient to continue Wellbutrin at present 300 mg dose.  -     sertraline (ZOLOFT) 100 MG tablet; Take 1 tablet (100 mg total) by mouth once daily. Generic please  Dispense: 30 tablet; Refill: 2  -     busPIRone (BUSPAR) 5 MG Tab; Take 5-7.5 mg p.o. t.i.d. as needed for anxiety.  Generic please  Dispense: 30 tablet; Refill: 1    Diffuse abdominal pain:  Has cleared off Rybelsus    Side effect of medication: Rybelsus his suspected as having caused his nausea and diffuse abdominal discomfort.  Both have cleared off the medication.    Obesity (BMI 30-39.9): Caloric  restriction w regular exercise and weight reduction.    Abnormal abdominal ultrasound:  Recent ultrasound the abdomen showed changes consistent with hepatic steatosis/fatty liver.  Also possible portal venous hypertension noted please see report sling or megaly was also noted.  He has an upcoming appointment with [facility] for further evaluation and treatment.    Encounter for lab test:  Labs reviewed and discussed with patient at length in ordered for follow-up.

## 2022-07-08 NOTE — Clinical Note
Please call patient and schedule his follow-up appointment in 4 weeks with overnight fasting labs a few days before hand.  Please also inform patient his after visit summary is available for viewing in his portal.

## 2022-07-08 NOTE — PROGRESS NOTES
Subjective:    .The patient location is: home  The chief complaint leading to consultation is:  Management of his diabetes    Visit type:  Tele audiovisual [id number]  Face to Face time with patient:  2:14 p.m. to 2:46 p.m..  40 minutes of total time spent on the encounter, which includes face to face time and non-face to face time preparing to see the patient (eg, review of tests), Obtaining and/or reviewing separately obtained history, Documenting clinical information in the electronic or other health record, Independently interpreting results (not separately reported) and communicating results to the patient/family/caregiver, or Care coordination (not separately reported).     Each patient to whom he or she provides medical services by telemedicine is:  (1) informed of the relationship between the physician and patient and the respective role of any other health care provider with respect to management of the patient; and (2) notified that he or she may decline to receive medical services by telemedicine and may withdraw from such care at any time.    Notes:  Please see below     Patient ID: Roberto Carlos Souza is a 26 y.o. male.    Chief Complaint:  Diabetic management    HPI:  Here today to discuss diabetic management.  Hemoglobin A1c has gone from 9.2 to now 10.5.  Type 2 diabetes mellitus with hyperglycemia, without long-term current use of insulin:  Patient is completely gone off his diet and knows the importance of getting back on low carb diet as well as regular exercise which also will help his blood sugar control.  Blood sugars have been 250+ with them being 260 this morning.  His hemoglobin A1c is up to 10.5 from 9.2.  And he has gained a little weight at 250 lb.  He admits to eating more food will with a good appetite.   To take glimepiride 2 mg 1 and half tabs or 3 mg with each breakfast.  If after 7 days his blood sugars still greater than 150 he is to increase the glimepiride to 4 mg every morning.  For this  evening only at dinner time if his blood sugars greater than 150, he is to take a half of a 2 mg tablet of glimepiride.  Patient to continue Jardiance 25 mg every morning.  To remain off Rybelsus; his abdominal diffuse discomfort has cleared completely off the medication  -     Basic Metabolic Panel; Future; Expected date: 07/08/2022  Dietary noncompliance:  Has  been completely off his diabetic diet.  Have expressed the importance of being back on a low carb diet as well as regular exercise for getting his blood sugar back under control  Anxiety with depression:  Depression has been doing well but feels like his if he is under increased anxiety.  Will increase his sertraline from  mg per day.  Patient to continue Wellbutrin at present 300 mg dose.  -     sertraline (ZOLOFT) 100 MG tablet; Take 1 tablet (100 mg total) by mouth once daily. Generic please  Dispense: 30 tablet; Refill: 2  -     busPIRone (BUSPAR) 5 MG Tab; Take 5-7.5 mg p.o. t.i.d. as needed for anxiety.  Generic please  Dispense: 30 tablet; Refill: 1  Diffuse abdominal pain:  Has cleared off Rybelsus  Side effect of medication: Rybelsus his suspected as having caused his nausea and diffuse abdominal discomfort.  Both have cleared off the medication.  Obesity (BMI 30-39.9): Caloric restriction w regular exercise and weight reduction.  Abnormal abdominal ultrasound:  Recent ultrasound the abdomen showed changes consistent with hepatic steatosis/fatty liver.  Also possible portal venous hypertension noted please see report splenomegaly was also noted.  He has an upcoming appointment [facility] for further evaluation and treatment.  Encounter for lab test:  Labs reviewed and discussed with patient at length in ordered for follow-up.        Review of Systems   Constitutional: Negative for fatigue, fever and unexpected weight change.   HENT: Negative for congestion, postnasal drip and rhinorrhea.    Respiratory: Negative for cough, chest  tightness, shortness of breath and wheezing.    Cardiovascular: Negative for chest pain, palpitations and leg swelling.   Gastrointestinal: Negative for abdominal pain, blood in stool, constipation, diarrhea, nausea and vomiting.   Endocrine: Positive for polydipsia, polyphagia and polyuria.   Genitourinary: Negative for dysuria and hematuria.   Skin: Negative for pallor.   Neurological: Negative for dizziness, tremors, seizures, speech difficulty, weakness and headaches.   Psychiatric/Behavioral: Positive for dysphoric mood. Negative for confusion. The patient is nervous/anxious.         Anxiety has been increased but not depression      Objective:        Vitals:    22 1438   Weight: 113.4 kg (250 lb)       BMI Readings from Last 3 Encounters:   22 32.53 kg/m²   21 33.13 kg/m²   21 33.89 kg/m²        Wt Readings from Last 3 Encounters:   22 1438 113.4 kg (250 lb)   22 0833 111.8 kg (246 lb 9.4 oz)   21 0811 113.9 kg (251 lb 1.7 oz)        BP Readings from Last 3 Encounters:   22 126/76   21 138/88   21 126/78        There are no preventive care reminders to display for this patient.     Health Maintenance Due   Topic Date Due    Pneumococcal Vaccines (Age 0-64) (1 - PCV) Never done    HIV Screening  Never done    Foot Exam  2020    COVID-19 Vaccine (3 - Booster for Pfizer series) 10/14/2021    Diabetes Urine Screening  2022         Past Medical History:   Diagnosis Date    Anxiety     Diabetes mellitus, type 2     2015 dx.    Dyslipidemia     Hypertension     lisinopril in past    Obesity        Past Surgical History:   Procedure Laterality Date    LASIK Bilateral 2015    SPINE SURGERY      2 disc removed; herniated disc. Lower back. Left leg crush injury       Social History     Tobacco Use    Smoking status: Former Smoker     Types: Cigars     Quit date: 2018     Years since quittin.5    Smokeless tobacco: Never Used    Substance Use Topics    Alcohol use: Yes     Comment: social; less then one a week    Drug use: No       Family History   Problem Relation Age of Onset    Diabetes Mother     Breast cancer Mother         breast cancer; dx at early 40's    Diabetes Father     Colon cancer Neg Hx     Glaucoma Neg Hx        Review of patient's allergies indicates:   Allergen Reactions    Metformin      Discontinued with hematochezia; symptoms markedly improved but still persistent; remain off metformin    Rybelsus [semaglutide] Other (See Comments)     abdominal pain, nausea       Current Outpatient Medications on File Prior to Visit   Medication Sig Dispense Refill    atorvastatin (LIPITOR) 10 MG tablet Take 1 tablet (10 mg total) by mouth once daily. 90 tablet 1    blood sugar diagnostic (CONTOUR NEXT STRIPS) Strp 1 each by Misc.(Non-Drug; Combo Route) route once daily. 30 each 11    buPROPion (WELLBUTRIN XL) 300 MG 24 hr tablet Take 1 tablet (300 mg total) by mouth once daily. 90 tablet 3    glimepiride (AMARYL) 2 MG tablet Take 1 tablet (2 mg total) by mouth before breakfast. 30 tablet 0    JARDIANCE 25 mg tablet TAKE 1 TABLET (25 MG TOTAL) BY MOUTH ONCE DAILY. 90 tablet 0    lancets (MICROLET LANCET) Misc 1 each by Misc.(Non-Drug; Combo Route) route 3 (three) times daily. 100 each 11    ondansetron (ZOFRAN) 4 MG tablet Take 1 tablet (4 mg total) by mouth every 6 (six) hours as needed for Nausea (Or vomiting as needed). 15 tablet 1    pantoprazole (PROTONIX) 40 MG tablet Take 1 tablet (40 mg total) by mouth 2 (two) times daily. 40 mg p.o. daily as needed for GI upset or heartburn or belching 60 tablet 2    [DISCONTINUED] sertraline (ZOLOFT) 50 MG tablet TAKE ONE TABLET BY MOUTH DAILY 90 tablet 0     No current facility-administered medications on file prior to visit.       Physical Exam  Constitutional:       General: He is not in acute distress.     Appearance: He is well-developed. He is not diaphoretic.       Comments: Answers questions appropriately and with good thought content.   HENT:      Head: Normocephalic and atraumatic.   Pulmonary:      Effort: Pulmonary effort is normal. No respiratory distress.   Abdominal:      Comments: Generalized abdominal discomfort has cleared with stopping Rybelsus   Musculoskeletal:         General: Normal range of motion.   Neurological:      Mental Status: He is alert and oriented to person, place, and time.   Psychiatric:         Behavior: Behavior normal.         Assessment:       1. Type 2 diabetes mellitus with hyperglycemia, without long-term current use of insulin    2. Dietary noncompliance    3. Anxiety with depression    4. Diffuse abdominal pain    5. Side effect of medication    6. Obesity (BMI 30-39.9)    7. Abnormal abdominal ultrasound        Plan:       Type 2 diabetes mellitus with hyperglycemia, without long-term current use of insulin:  Patient is completely gone off his diet and knows the importance of getting back on low carb diet as well as regular exercise which also will help his blood sugar control.  Blood sugars have been 250+ with them being 260 this morning.  His hemoglobin A1c is up to 10.5 from 9.2.  And he has gained a little weight at 250 lb.  He admits to eating more food will with a good appetite.   To take glimepiride 2 mg 1 and half tabs or 3 mg with each breakfast.  If after 7 days his blood sugars still greater than 150 he is to increase the glimepiride to 4 mg every morning.  For this evening only at dinner time if his blood sugars greater than 150, he is to take a half of a 2 mg tablet of glimepiride.  Patient to continue Jardiance 25 mg every morning.  To remain off Rybelsus; his abdominal diffuse discomfort has cleared completely off the medication  -     Basic Metabolic Panel; Future; Expected date: 07/08/2022    Dietary noncompliance:  Has been completely off his diabetic diet.  Have expressed the importance of being back on a low carb diet  as well as regular exercise for getting his blood sugar back under control    Anxiety with depression:  Depression has been doing well but feels like his if he is under increased anxiety.  Will increase his sertraline from  mg per day.  Patient to continue Wellbutrin at present 300 mg dose.  -     sertraline (ZOLOFT) 100 MG tablet; Take 1 tablet (100 mg total) by mouth once daily. Generic please  Dispense: 30 tablet; Refill: 2  -     busPIRone (BUSPAR) 5 MG Tab; Take 5-7.5 mg p.o. t.i.d. as needed for anxiety.  Generic please  Dispense: 30 tablet; Refill: 1    Diffuse abdominal pain:  Has cleared off Rybelsus    Side effect of medication: Rybelsus his suspected as having caused his nausea and diffuse abdominal discomfort.  Both have cleared off the medication.    Obesity (BMI 30-39.9): Caloric restriction w regular exercise and weight reduction.    Abnormal abdominal ultrasound:  Recent ultrasound the abdomen showed changes consistent with hepatic steatosis/fatty liver.  Also possible portal venous hypertension noted please see report sling or megaly was also noted.  He has an upcoming appointment with [facility] for further evaluation and treatment.    Encounter for lab test:  Labs reviewed and discussed with patient at length in ordered for follow-up.

## 2022-07-13 ENCOUNTER — TELEPHONE (OUTPATIENT)
Dept: HEPATOLOGY | Facility: CLINIC | Age: 27
End: 2022-07-13
Payer: COMMERCIAL

## 2022-07-13 DIAGNOSIS — K76.0 FATTY LIVER: Primary | ICD-10-CM

## 2022-07-19 ENCOUNTER — PROCEDURE VISIT (OUTPATIENT)
Dept: HEPATOLOGY | Facility: CLINIC | Age: 27
End: 2022-07-19
Payer: COMMERCIAL

## 2022-07-19 ENCOUNTER — TELEPHONE (OUTPATIENT)
Dept: HEPATOLOGY | Facility: CLINIC | Age: 27
End: 2022-07-19

## 2022-07-19 ENCOUNTER — LAB VISIT (OUTPATIENT)
Dept: LAB | Facility: HOSPITAL | Age: 27
End: 2022-07-19
Payer: COMMERCIAL

## 2022-07-19 ENCOUNTER — OFFICE VISIT (OUTPATIENT)
Dept: HEPATOLOGY | Facility: CLINIC | Age: 27
End: 2022-07-19
Payer: COMMERCIAL

## 2022-07-19 VITALS
HEART RATE: 69 BPM | OXYGEN SATURATION: 97 % | HEIGHT: 73 IN | DIASTOLIC BLOOD PRESSURE: 83 MMHG | SYSTOLIC BLOOD PRESSURE: 129 MMHG | BODY MASS INDEX: 33.04 KG/M2 | TEMPERATURE: 98 F | WEIGHT: 249.31 LBS | RESPIRATION RATE: 18 BRPM

## 2022-07-19 DIAGNOSIS — R16.1 SPLENOMEGALY: ICD-10-CM

## 2022-07-19 DIAGNOSIS — K76.0 FATTY LIVER: Primary | ICD-10-CM

## 2022-07-19 DIAGNOSIS — R93.89 ABNORMAL ULTRASOUND: ICD-10-CM

## 2022-07-19 DIAGNOSIS — R74.8 ELEVATED LIVER ENZYMES: ICD-10-CM

## 2022-07-19 DIAGNOSIS — K76.0 FATTY LIVER: ICD-10-CM

## 2022-07-19 LAB
A1AT SERPL-MCNC: 137 MG/DL (ref 100–190)
ALBUMIN SERPL BCP-MCNC: 4 G/DL (ref 3.5–5.2)
ALP SERPL-CCNC: 81 U/L (ref 55–135)
ALT SERPL W/O P-5'-P-CCNC: 37 U/L (ref 10–44)
AST SERPL-CCNC: 19 U/L (ref 10–40)
BILIRUB DIRECT SERPL-MCNC: 0.2 MG/DL (ref 0.1–0.3)
BILIRUB SERPL-MCNC: 0.7 MG/DL (ref 0.1–1)
CERULOPLASMIN SERPL-MCNC: 33 MG/DL (ref 15–45)
CK SERPL-CCNC: 43 U/L (ref 20–200)
FERRITIN SERPL-MCNC: 309 NG/ML (ref 20–300)
IGG SERPL-MCNC: 792 MG/DL (ref 650–1600)
PLATELET # BLD AUTO: 262 K/UL (ref 150–450)
PMV BLD AUTO: 10.1 FL (ref 9.2–12.9)
PROT SERPL-MCNC: 7.3 G/DL (ref 6–8.4)

## 2022-07-19 PROCEDURE — 99204 OFFICE O/P NEW MOD 45 MIN: CPT | Mod: S$GLB,,, | Performed by: PHYSICIAN ASSISTANT

## 2022-07-19 PROCEDURE — 85049 AUTOMATED PLATELET COUNT: CPT | Performed by: PHYSICIAN ASSISTANT

## 2022-07-19 PROCEDURE — 86235 NUCLEAR ANTIGEN ANTIBODY: CPT | Performed by: PHYSICIAN ASSISTANT

## 2022-07-19 PROCEDURE — 82390 ASSAY OF CERULOPLASMIN: CPT | Performed by: PHYSICIAN ASSISTANT

## 2022-07-19 PROCEDURE — 1160F RVW MEDS BY RX/DR IN RCRD: CPT | Mod: CPTII,S$GLB,, | Performed by: PHYSICIAN ASSISTANT

## 2022-07-19 PROCEDURE — 3046F PR MOST RECENT HEMOGLOBIN A1C LEVEL > 9.0%: ICD-10-PCS | Mod: CPTII,S$GLB,, | Performed by: PHYSICIAN ASSISTANT

## 2022-07-19 PROCEDURE — 91200 FIBROSCAN (VIBRATION CONTROLLED TRANSIENT ELASTOGRAPHY): ICD-10-PCS | Mod: S$GLB,,, | Performed by: PHYSICIAN ASSISTANT

## 2022-07-19 PROCEDURE — 3074F PR MOST RECENT SYSTOLIC BLOOD PRESSURE < 130 MM HG: ICD-10-PCS | Mod: CPTII,S$GLB,, | Performed by: PHYSICIAN ASSISTANT

## 2022-07-19 PROCEDURE — 1160F PR REVIEW ALL MEDS BY PRESCRIBER/CLIN PHARMACIST DOCUMENTED: ICD-10-PCS | Mod: CPTII,S$GLB,, | Performed by: PHYSICIAN ASSISTANT

## 2022-07-19 PROCEDURE — 82784 ASSAY IGA/IGD/IGG/IGM EACH: CPT | Performed by: PHYSICIAN ASSISTANT

## 2022-07-19 PROCEDURE — 1159F PR MEDICATION LIST DOCUMENTED IN MEDICAL RECORD: ICD-10-PCS | Mod: CPTII,S$GLB,, | Performed by: PHYSICIAN ASSISTANT

## 2022-07-19 PROCEDURE — 3046F HEMOGLOBIN A1C LEVEL >9.0%: CPT | Mod: CPTII,S$GLB,, | Performed by: PHYSICIAN ASSISTANT

## 2022-07-19 PROCEDURE — 3079F DIAST BP 80-89 MM HG: CPT | Mod: CPTII,S$GLB,, | Performed by: PHYSICIAN ASSISTANT

## 2022-07-19 PROCEDURE — 86256 FLUORESCENT ANTIBODY TITER: CPT | Mod: 91 | Performed by: PHYSICIAN ASSISTANT

## 2022-07-19 PROCEDURE — 82728 ASSAY OF FERRITIN: CPT | Performed by: PHYSICIAN ASSISTANT

## 2022-07-19 PROCEDURE — 3074F SYST BP LT 130 MM HG: CPT | Mod: CPTII,S$GLB,, | Performed by: PHYSICIAN ASSISTANT

## 2022-07-19 PROCEDURE — 86038 ANTINUCLEAR ANTIBODIES: CPT | Performed by: PHYSICIAN ASSISTANT

## 2022-07-19 PROCEDURE — 80076 HEPATIC FUNCTION PANEL: CPT | Performed by: PHYSICIAN ASSISTANT

## 2022-07-19 PROCEDURE — 99204 PR OFFICE/OUTPT VISIT, NEW, LEVL IV, 45-59 MIN: ICD-10-PCS | Mod: S$GLB,,, | Performed by: PHYSICIAN ASSISTANT

## 2022-07-19 PROCEDURE — 91200 LIVER ELASTOGRAPHY: CPT | Mod: S$GLB,,, | Performed by: PHYSICIAN ASSISTANT

## 2022-07-19 PROCEDURE — 3079F PR MOST RECENT DIASTOLIC BLOOD PRESSURE 80-89 MM HG: ICD-10-PCS | Mod: CPTII,S$GLB,, | Performed by: PHYSICIAN ASSISTANT

## 2022-07-19 PROCEDURE — 99999 PR PBB SHADOW E&M-EST. PATIENT-LVL V: CPT | Mod: PBBFAC,,, | Performed by: PHYSICIAN ASSISTANT

## 2022-07-19 PROCEDURE — 3008F BODY MASS INDEX DOCD: CPT | Mod: CPTII,S$GLB,, | Performed by: PHYSICIAN ASSISTANT

## 2022-07-19 PROCEDURE — 99999 PR PBB SHADOW E&M-EST. PATIENT-LVL V: ICD-10-PCS | Mod: PBBFAC,,, | Performed by: PHYSICIAN ASSISTANT

## 2022-07-19 PROCEDURE — 3008F PR BODY MASS INDEX (BMI) DOCUMENTED: ICD-10-PCS | Mod: CPTII,S$GLB,, | Performed by: PHYSICIAN ASSISTANT

## 2022-07-19 PROCEDURE — 82103 ALPHA-1-ANTITRYPSIN TOTAL: CPT | Performed by: PHYSICIAN ASSISTANT

## 2022-07-19 PROCEDURE — 1159F MED LIST DOCD IN RCRD: CPT | Mod: CPTII,S$GLB,, | Performed by: PHYSICIAN ASSISTANT

## 2022-07-19 PROCEDURE — 87340 HEPATITIS B SURFACE AG IA: CPT | Performed by: PHYSICIAN ASSISTANT

## 2022-07-19 PROCEDURE — 82550 ASSAY OF CK (CPK): CPT | Performed by: PHYSICIAN ASSISTANT

## 2022-07-19 PROCEDURE — 36415 COLL VENOUS BLD VENIPUNCTURE: CPT | Performed by: PHYSICIAN ASSISTANT

## 2022-07-19 NOTE — PROCEDURES
FibroScan (Vibration Controlled Transient Elastography)    Date/Time: 7/19/2022 3:30 PM  Performed by: Shauna Hill PA-C  Authorized by: Shauna Hill PA-C     Diagnosis:  NAFLD    Probe:  XL    Universal Protocol: Patient's identity, procedure and site were verified, confirmatory pause was performed.  Discussed procedure including risks and potential complications.  Questions answered.  Patient verbalizes understanding and wishes to proceed with VCTE.     Procedure: After providing explanations of the procedure, patient was placed in the supine position with right arm in maximum abduction to allow optimal exposure of right lateral abdomen.  Patient was briefly assessed, Testing was performed in the mid-axillary location, 50Hz Shear Wave pulses were applied and the resulting Shear Wave and Propagation Speed detected with a 3.5 MHz ultrasonic signal, using the FibroScan probe, Skin to liver capsule distance and liver parenchyma were accessed during the entire examination with the FibroScan probe, Patient was instructed to breathe normally and to abstain from sudden movements during the procedure, allowing for random measurements of liver stiffness. At least 10 Shear Waves were produced, Individual measurements of each Shear Wave were calculated.  Patient tolerated the procedure well with no complications.  Meets discharge criteria as was dismissed.  Rates pain 0 out of 10.  Patient will follow up with ordering provider to review results.      Findings  Median liver stiffness score:  4.5  CAP Reading: dB/m:  315    IQR/med %:  18  Interpretation  Fibrosis interpretation is based on medial liver stiffness - Kilopascal (kPa).    Fibrosis Stage:  F 0-1  Steatosis interpretation is based on controlled attenuation parameter - (dB/m).    Steatosis Grade:  S3

## 2022-07-19 NOTE — PATIENT INSTRUCTIONS
FATTY LIVER RECOMMENDATIONS:    There is no FDA approved therapy for non-alcoholic fatty liver disease (NAFLD); therefore, lifestyle changes are important:  1. Weight loss goal of 24 lbs  2. Mediterranean diet is recommended that focuses on low-carb, low-sugar, and low-processed foods.  3. Exercise, 5 days per week, 30 minutes per day, as tolerated  4. Recommend good control of cholesterol, blood pressure, blood sugar levels (as these are risk factors for fatty liver). Cholesterol lowering medications including statins are typically safe and beneficial (even if liver enzymes are elevated).    5. Limit alcohol consumption, no more than 1 serving(s) of alcohol in any day (1 serving is 5 ounces of wine, 12 ounces of beer, or 1.5 ounces of liquor). Do not recommend daily alcohol use.        In some people, fatty liver can progress to steatohepatitis (inflammatory fatty liver) and possibly to cirrhosis, putting one at increased risk for liver cancer and liver failure in the future. Lifestyle changes can help to decrease this risk.     If interested in seeing a dietician to create a weight loss plan, contact the dietician team at [facility]: [email or web address].  You can also call to schedule a consult with a dietician: [phone]. *Virtual visits are available with one of our dieticians.     If you have diabetes or high blood pressure, you can meet with a Health  through [facility]. Let us know if you are interested in a referral.     Ask about our fatty liver/BERNARDO clinical trials if you have fibrosis / scar tissue related to fatty liver.    *If statins are being considered for HLD, statins are safe in patients with liver disease. Statins can be used to treat dyslipidemia in patients with both NAFLD and BERNARDO.      FATTY LIVER DIET TIPS:    ADD OLIVE OIL. I recommend olive oil daily (in salads or when cooking)  ADD COFFEE. Black coffee has also been found to be potentially  beneficial (skip the sweets and creamer). Add 1-2 cups per day,  if you are able to tolerate. Decaf is fine.  BE MINDFUL OF CARBS AND ADDED SUGARS. Try to limit your carb intake to LESS than 30-45 grams of carbs with a meal or LESS than 5-10 grams with any snack (total of any snack foods eaten during that time).   KEEP A FOOD DIARY. Use MyFitness Pal  OR LOSE IT beverley to add up your carbs through the day - the most successful folks on weight loss journey TRACK their progress and food.  LIMIT WHAT YOU ARE DRINKING. Do NOT drink any beverages with calories or carbs (this can lead to high blood sugar and weight gain). Also, some of our patients have been very successful with weight loss using the pre made/planned meal planning services that limit calories and portion size (one example is Sensible Meals but there are many out there). Unsweetened black or green tea is fine! Hibiscus tea is also great and refreshing, especially iced.     Tips for low/moderate carbohydrate diet:  3 meals a day made up of the following:  -- Green vegetables, tomatoes, mushrooms, spaghetti squash, cauliflower, meat, poultry, seafood, eggs   -- Beans (1-1.5 cups) or nuts (1/4 cup): can have 1 x a day.  -- Greek yogurt and fermented foods like kefir, kimchi, saurkraut (be careful if you have swelling issues as lots of salt can worsen swelling or blood pressure)  -- Dressings, seasonings, condiments, etc should have less than 2 g sugars.   -- 1-2 servings of citrus fruits, berries, pineapple or melon a day (1/2 cup)  -- Avoid fried foods  -- Avoid grains, rice, pasta, potatoes, bread, corn, peas, oatmeal, grits, tortillas, crackers, chips  -- No soda, sweet tea, juices or lemonade  -- Use olive/avocado oil rather than vegetable oils or butter.        ADDITIONAL RESOURCES:  Websites with information about fatty liver and inflammation related to fatty liver (BERNARDO): www.nashtruth.[employer]. and www.nashactually.com   [facility]: Non-Alcoholic Fatty Liver  Disease (NAFLD)    Facebook support group with tips and recipes:   Non-Alcoholic Fatty Liver Disease (NAFLD) Diet & Nutrition Support     Try [email or web address].[employer]P for recipes.

## 2022-07-19 NOTE — PROGRESS NOTES
Hepatology Consultation: [facility]    NEW PATIENT  PCP: Manan Arthur MD    Subjective      Mr. Souza is a 27 y.o. male with PMH as listed below who presents to clinic for evaluation of fatty liver with splenomegaly.     Recently obtained ultrasound for RUQ pain attributed to Rybelsus, suggesting fatty liver as well as portal vein change and splenomegaly. No prior imaging to compare.     I see prior history of elevated liver enzymes.     Lab Results   Component Value Date    ALT 34 04/20/2022    AST 25 04/20/2022    ALKPHOS 75 04/20/2022    BILITOT 0.8 04/20/2022    ALBUMIN 3.7 04/20/2022     05/14/2021     In relation to metabolic risk factors:   Lab Results   Component Value Date    HGBA1C 10.5 (H) 07/05/2022    CHOL 177 04/09/2022    TSH 1.294 05/14/2021     Body mass index is 32.9 kg/m².    Fibroscan 07/19/2022 today suggests S3, F0-1    Initial history 07/19/2022:  Roberto Carlos Souza arrives today alone. Denies symptoms of hepatic decompensation including jaundice, ascites, cognitive problems to suggest hepatic encephalopathy, or GI bleeding. Denies fluid issues or LE edema. Denies RUQ pain.     Reports 7-8 years he has been diabetic. Could watch his diet more and admits to fast food. Reports he travels for work a lot so difficult to plan for meals. His blood sugars are recently uncontrolled. Started jardiance. States his weight has been stable.     Denies family history of liver disease. Never told he had a fatty liver.   2019 - HLD - started atorvastatin; pravastatin d/c'd    He rarely drinks alcohol. Last drink was 7 months ago. Denies prior heavy alcohol use. No smoking or prior drug use. No new sexual partners. No supplements.     Vaccinated against HAV & HBV per chart review.      PMH Roberto Carlos has a past medical history of Anxiety, Diabetes mellitus, type 2, Dyslipidemia, Hypertension, and Obesity.   PSXH Roberto Carlos has a past surgical history that includes LASIK  "(Bilateral, 2015) and Spine surgery (2011).    Roberto Carlos's family history includes Breast cancer in his mother; Diabetes in his father and mother.   SH Roberto Carlos reports that he quit smoking about 4 years ago. His smoking use included cigars. He has never used smokeless tobacco. He reports current alcohol use. He reports that he does not use drugs.   ALG Roberto Carlos is allergic to metformin and rybelsus [semaglutide].   MED Roberto Carlos has a current medication list which includes the following prescription(s): atorvastatin, blood sugar diagnostic, bupropion, buspirone, glimepiride, jardiance, lancets, ondansetron, pantoprazole, and sertraline.           ROS:   GENERAL: Denies fatigue  HEENT: Denies yellowing of eyes   CARDIOVASCULAR: Denies edema  GI: Denies abdominal pain  SKIN: Denies rash, itching   NEURO: Denies confusion, memory loss, or mood changes  HEME/LYMPH: Denies easy bruising or bleeding      Objective     /83 (BP Location: Right arm, Patient Position: Sitting, BP Method: Medium (Automatic))   Pulse 69   Temp 98.4 °F (36.9 °C) (Oral)   Resp 18   Ht 6' 1" (1.854 m)   Wt 113.1 kg (249 lb 5.4 oz)   SpO2 97%   BMI 32.90 kg/m²     PHYSICAL EXAM:   Friendly man, obese, in no acute distress; alert and oriented to person, place and time  EYES: Sclerae anicteric  GI: Soft, non-tender, non-distended. No ascites.  EXTREMITIES:  No edema.  SKIN: Warm and dry. No jaundice. No telangectasias noted. No palmar erythema.  NEURO:  Normal gait. No asterixis.  PSYCH:  Memory intact. Thought and speech pattern appropriate. Behavior normal      DIAGNOSTICS  Lab Results   Component Value Date    ALT 34 04/20/2022    AST 25 04/20/2022    ALKPHOS 75 04/20/2022    BILITOT 0.8 04/20/2022    ALBUMIN 3.7 04/20/2022     05/14/2021     No results found for: AFP        Prior serologic workup:   Lab Results   Component Value Date    HEPCAB Negative 02/15/2022       Imaging:  US Abdomen Limited  Narrative: EXAMINATION:  US ABDOMEN " LIMITED    CLINICAL HISTORY:  RUQ pain;.    Unspecified abdominal pain    TECHNIQUE:  Limited ultrasound evaluation of the abdomen (including the pancreas,IVC, liver, gallbladder, common bile duct, and spleen) was performed utilizing grayscale and color flow imaging.    COMPARISON:  None.    FINDINGS:  Pancreas: The pancreas is normal in echogenicity without focal mass or definite pseudocyst where visualized.    Liver:The liver is normal in size measuring 15.1 cm in sagittal dimension.  The liver demonstrates increased attenuation of the ultrasound beam suggesting hepatic steatosis.  There is a geographic focal fatty sparing noted adjacent to the gallbladder fossa..  The portal vein is patent and shows normal directional flow.The portal vein measures 11 mm.  There is a 6.5% caliber change in the size of the portal vein between quiet respiration and deep inspiration.  The IVC is patent where visualized.    Biliary system:  The gallbladder shows no evidence of shadowing stones, distension, pericholecystic fluid, or sonographic Natarajan sign.The common bile duct is not dilated, measuring 3 mm. No intrahepatic biliary ductal dilatation.    Spleen: The spleen is enlarged in size measuring  15 x 6.3 x 6.7cm.No splenic mass.    Miscellaneous: No ascites.  Impression: 1. Sonographic findings which suggest hepatic steatosis with geographic focal fatty sparing noted adjacent to the gallbladder fossa.  2. 6.5% caliber change in the size of the portal vein between quiet respiration and deep inspiration suggesting possible portal venous hypertension.  3. Splenomegaly.    Electronically signed by: Bashir Edwards MD  Date:    04/27/2022  Time:    08:11         Findings Fibroscan 07/19/2022  Median liver stiffness score:  4.5  CAP Reading: dB/m:  315     IQR/med %:  18  Interpretation  Fibrosis interpretation is based on medial liver stiffness - Kilopascal (kPa).     Fibrosis Stage:  F 0-1  Steatosis interpretation is based on  controlled attenuation parameter - (dB/m).     Steatosis Grade:  S3    Assessment/Plan     27 y.o. male with:    1. Abnormal ultrasound  2. Fatty liver  - with RF including BM 32.9, DM2   - recommended nutritionist, declined  - recommend Mediterranean diet with 10% body weight loss.   - recommend optimal diabetic control. Last a1c worse >10%     3. Elevated liver enzymes  - chronic hepatocellular without hepatic dysfunction  - since at least 2017   - recently better  - Alpha-1-Antitrypsin; Future  - BRYNN Screen w/Reflex; Future  - Antimitochondrial Antibody; Future  - Anti-Smooth Muscle Antibody; Future  - Ceruloplasmin; Future  - CK; Future  - Ferritin; Future  - IgG; Future  - Hepatitis B Surface Antigen; Future  - Hepatic Function Panel; Future    4. Splenomegaly  - no evidence of fibrosis today, will inform PCP  - no thrombocytopenia  - consider hematology referral if needed         Orders Placed This Encounter   Procedures    Alpha-1-Antitrypsin    BRYNN Screen w/Reflex    Antimitochondrial Antibody    Anti-Smooth Muscle Antibody    Ceruloplasmin    CK    Ferritin    IgG    Hepatitis B Surface Antigen    Hepatic Function Panel    PLATELET COUNT       · Fibroscan today suggests no fibrosis and severe fatty liver. No thrombocytopenia. Will CC PCP regarding findings.  · Labs today to r/o causes of liver disease given prior video visit follow-up thereafter.  · Discussed recommend weight loss of 24 lbs with Mediterranean high protein low carb diet. Recommend diabetic control optimal. Nutritionist referral declined.   · Recommend ultrasound in 1 year w/ follow-up thereafter.        I spent 45 minutes on the day of this encounter preparing for, evaluating, treating, and managing this patient.       Thank you for allowing me to participate in the care of LISSETTE AlfordC  Hepatology & Liver Transplant

## 2022-07-20 DIAGNOSIS — E11.65 TYPE 2 DIABETES MELLITUS WITH HYPERGLYCEMIA, WITHOUT LONG-TERM CURRENT USE OF INSULIN: ICD-10-CM

## 2022-07-20 LAB
ANA SER QL IF: NORMAL
HBV SURFACE AG SERPL QL IA: NEGATIVE

## 2022-07-20 NOTE — TELEPHONE ENCOUNTER
No new care gaps identified.  [facility] Embedded Care Gaps. Reference number: [id number]. 7/20/2022   6:55:03 AM CDT

## 2022-07-20 NOTE — TELEPHONE ENCOUNTER
No new care gaps identified.  [facility] Embedded Care Gaps. Reference number: [id number]. 7/20/2022   6:55:36 AM CDT

## 2022-07-21 RX ORDER — EMPAGLIFLOZIN 25 MG/1
25 TABLET, FILM COATED ORAL DAILY
Qty: 90 TABLET | Refills: 1 | Status: SHIPPED | OUTPATIENT
Start: 2022-07-21 | End: 2022-08-11 | Stop reason: SDUPTHER

## 2022-07-21 RX ORDER — GLIMEPIRIDE 2 MG/1
2 TABLET ORAL
Qty: 90 TABLET | Refills: 1 | Status: SHIPPED | OUTPATIENT
Start: 2022-07-21 | End: 2022-10-28 | Stop reason: SDUPTHER

## 2022-07-21 NOTE — TELEPHONE ENCOUNTER
Agree with recommendation to consider seeing hematology for splenomegaly workup, but will defer to PCP who has seen patient more recently.

## 2022-07-22 LAB
MITOCHONDRIA AB TITR SER IF: NORMAL {TITER}
SMOOTH MUSCLE AB TITR SER IF: NORMAL {TITER}

## 2022-08-02 ENCOUNTER — OFFICE VISIT (OUTPATIENT)
Dept: HEPATOLOGY | Facility: CLINIC | Age: 27
End: 2022-08-02
Payer: COMMERCIAL

## 2022-08-02 ENCOUNTER — TELEPHONE (OUTPATIENT)
Dept: HEPATOLOGY | Facility: CLINIC | Age: 27
End: 2022-08-02

## 2022-08-02 DIAGNOSIS — E66.9 OBESITY (BMI 30-39.9): ICD-10-CM

## 2022-08-02 DIAGNOSIS — K76.0 FATTY LIVER: Primary | ICD-10-CM

## 2022-08-02 DIAGNOSIS — R74.8 ELEVATED LIVER ENZYMES: ICD-10-CM

## 2022-08-02 DIAGNOSIS — E11.65 TYPE 2 DIABETES MELLITUS WITH HYPERGLYCEMIA, WITHOUT LONG-TERM CURRENT USE OF INSULIN: ICD-10-CM

## 2022-08-02 PROCEDURE — 3046F PR MOST RECENT HEMOGLOBIN A1C LEVEL > 9.0%: ICD-10-PCS | Mod: CPTII,95,, | Performed by: PHYSICIAN ASSISTANT

## 2022-08-02 PROCEDURE — 3046F HEMOGLOBIN A1C LEVEL >9.0%: CPT | Mod: CPTII,95,, | Performed by: PHYSICIAN ASSISTANT

## 2022-08-02 PROCEDURE — 1160F PR REVIEW ALL MEDS BY PRESCRIBER/CLIN PHARMACIST DOCUMENTED: ICD-10-PCS | Mod: CPTII,95,, | Performed by: PHYSICIAN ASSISTANT

## 2022-08-02 PROCEDURE — 99213 OFFICE O/P EST LOW 20 MIN: CPT | Mod: 95,,, | Performed by: PHYSICIAN ASSISTANT

## 2022-08-02 PROCEDURE — 1160F RVW MEDS BY RX/DR IN RCRD: CPT | Mod: CPTII,95,, | Performed by: PHYSICIAN ASSISTANT

## 2022-08-02 PROCEDURE — 1159F MED LIST DOCD IN RCRD: CPT | Mod: CPTII,95,, | Performed by: PHYSICIAN ASSISTANT

## 2022-08-02 PROCEDURE — 1159F PR MEDICATION LIST DOCUMENTED IN MEDICAL RECORD: ICD-10-PCS | Mod: CPTII,95,, | Performed by: PHYSICIAN ASSISTANT

## 2022-08-02 PROCEDURE — 99213 PR OFFICE/OUTPT VISIT, EST, LEVL III, 20-29 MIN: ICD-10-PCS | Mod: 95,,, | Performed by: PHYSICIAN ASSISTANT

## 2022-08-02 NOTE — TELEPHONE ENCOUNTER
----- Message from Shauna Hill PA-C sent at 8/2/2022  2:43 PM CDT -----  Call patient and schedule follow-up in 1 year with labs 1 week prior to visit. Thanks!

## 2022-08-02 NOTE — PROGRESS NOTES
The patient location is: work, [place]   The chief complaint leading to consultation is: fatty liver    Visit type: audiovisual    Face to Face time with patient: 10 min  20 minutes of total time spent on the encounter, which includes face to face time and non-face to face time preparing to see the patient (eg, review of tests), Obtaining and/or reviewing separately obtained history, Documenting clinical information in the electronic or other health record, Independently interpreting results (not separately reported) and communicating results to the patient/family/caregiver, or Care coordination (not separately reported).         Each patient to whom he or she provides medical services by telemedicine is:  (1) informed of the relationship between the physician and patient and the respective role of any other health care provider with respect to management of the patient; and (2) notified that he or she may decline to receive medical services by telemedicine and may withdraw from such care at any time.    Notes:     Hepatology Consultation: [facility]    EST. PATIENT  PCP: Manan Arthur MD    Subjective      Mr. Souza is a 27 y.o. male with PMH as listed below who presents to clinic for evaluation of fatty liver with splenomegaly.     Recently obtained ultrasound for RUQ pain attributed to Rybelsus, suggesting fatty liver as well as portal vein change and splenomegaly. No prior imaging to compare.     I see prior history of elevated liver enzymes.     Lab Results   Component Value Date    ALT 37 07/19/2022    AST 19 07/19/2022    ALKPHOS 81 07/19/2022    BILITOT 0.7 07/19/2022    ALBUMIN 4.0 07/19/2022     07/19/2022     In relation to metabolic risk factors:   Lab Results   Component Value Date    HGBA1C 10.5 (H) 07/05/2022    CHOL 177 04/09/2022    TSH 1.294 05/14/2021     Body mass index is 32.9 kg/m².    Fibroscan 07/19/2022 today suggests S3, F0-1    Initial history  07/19/2022:  Roberto Carlos Souza arrives today alone. Denies symptoms of hepatic decompensation including jaundice, ascites, cognitive problems to suggest hepatic encephalopathy, or GI bleeding. Denies fluid issues or LE edema. Denies RUQ pain.     Reports 7-8 years he has been diabetic. Could watch his diet more and admits to fast food. Reports he travels for work a lot so difficult to plan for meals. His blood sugars are recently uncontrolled. Started jardiance. States his weight has been stable.     Denies family history of liver disease. Never told he had a fatty liver.   2019 - HLD - started atorvastatin; pravastatin d/c'd    He rarely drinks alcohol. Last drink was 7 months ago. Denies prior heavy alcohol use. No smoking or prior drug use. No new sexual partners. No supplements.     Vaccinated against HAV & HBV per chart review.    Interval history 08/02/2022:  Roberto Carlos Souza arrives today on video alone  Since our last visit, he has cut his carb intake by 75% and is eating mainly protein. Motivated to lose weight and control Blood sugars. His blood sugars have improved. He is feeling well and denies any abdominal pain since discontinuing Rybelsus. Denies symptoms of hepatic decompensation including jaundice, ascites, cognitive problems to suggest hepatic encephalopathy, or GI bleeding.     Completed labs and Fibroscan. Not sure when next a1c check in is.         [facility] Roberto Carlos has a past medical history of Anxiety, Diabetes mellitus, type 2, Dyslipidemia, Hypertension, and Obesity.   PS Roberto Carlos has a past surgical history that includes LASIK (Bilateral, 2015) and Spine surgery (2011).    Roberto Carlos's family history includes Breast cancer in his mother; Diabetes in his father and mother.    Roberto Carlos reports that he quit smoking about 4 years ago. His smoking use included cigars. He has never used smokeless tobacco. He reports current alcohol use. He reports that he does not use drugs.   ALG Roberto Carlos is allergic to metformin  and rybelsus [semaglutide].   JUAN Azevedo has a current medication list which includes the following prescription(s): atorvastatin, blood sugar diagnostic, bupropion, buspirone, jardiance, glimepiride, lancets, ondansetron, pantoprazole, and sertraline.           ROS:   GENERAL: Denies fatigue  HEENT: Denies yellowing of eyes   CARDIOVASCULAR: Denies edema  GI: Denies abdominal pain  SKIN: Denies rash, itching   NEURO: Denies confusion, memory loss, or mood changes  HEME/LYMPH: Denies easy bruising or bleeding      Objective     There were no vitals taken for this visit.    Physical exam is limited by video visit:   Friendly man, in no acute distress; alert and oriented to person, place and time  VITALS: There were no vitals taken for this visit.   SKIN/EYES: No obvious jaundice or yellowing of the eyes.   HENT: Normocephalic, without obvious abnormality.   NECK: Supple.   CARDIOVASCULAR: JUAN DAVID on video visit  RESPIRATORY: Normal respiratory effort.   GI: JUAN DAVID hepatosplenomegaly  PSYCH: Thought and speech pattern appropriate.     DIAGNOSTICS  Lab Results   Component Value Date    ALT 37 07/19/2022    AST 19 07/19/2022    ALKPHOS 81 07/19/2022    BILITOT 0.7 07/19/2022    ALBUMIN 4.0 07/19/2022     07/19/2022     No results found for: AFP        Prior serologic workup:   Lab Results   Component Value Date    SMOOTHMUSCAB Negative 1:40 07/19/2022    AMAIFA Negative 1:40 07/19/2022    IGGSERUM 792 07/19/2022    ANASCREEN Negative <1:80 07/19/2022    FERRITIN 309 (H) 07/19/2022    CERULOPLSM 33.0 07/19/2022    HEPBSAG Negative 07/19/2022    HEPCAB Negative 02/15/2022       Imaging:  US Abdomen Limited  Narrative: EXAMINATION:  US ABDOMEN LIMITED    CLINICAL HISTORY:  RUQ pain;.    Unspecified abdominal pain    TECHNIQUE:  Limited ultrasound evaluation of the abdomen (including the pancreas,IVC, liver, gallbladder, common bile duct, and spleen) was performed utilizing grayscale and color flow  imaging.    COMPARISON:  None.    FINDINGS:  Pancreas: The pancreas is normal in echogenicity without focal mass or definite pseudocyst where visualized.    Liver:The liver is normal in size measuring 15.1 cm in sagittal dimension.  The liver demonstrates increased attenuation of the ultrasound beam suggesting hepatic steatosis.  There is a geographic focal fatty sparing noted adjacent to the gallbladder fossa..  The portal vein is patent and shows normal directional flow.The portal vein measures 11 mm.  There is a 6.5% caliber change in the size of the portal vein between quiet respiration and deep inspiration.  The IVC is patent where visualized.    Biliary system:  The gallbladder shows no evidence of shadowing stones, distension, pericholecystic fluid, or sonographic Natarajan sign.The common bile duct is not dilated, measuring 3 mm. No intrahepatic biliary ductal dilatation.    Spleen: The spleen is enlarged in size measuring  15 x 6.3 x 6.7cm.No splenic mass.    Miscellaneous: No ascites.  Impression: 1. Sonographic findings which suggest hepatic steatosis with geographic focal fatty sparing noted adjacent to the gallbladder fossa.  2. 6.5% caliber change in the size of the portal vein between quiet respiration and deep inspiration suggesting possible portal venous hypertension.  3. Splenomegaly.    Electronically signed by: Bashir Edwards MD  Date:    04/27/2022  Time:    08:11         Findings Fibroscan 07/19/2022  Median liver stiffness score:  4.5  CAP Reading: dB/m:  315     IQR/med %:  18  Interpretation  Fibrosis interpretation is based on medial liver stiffness - Kilopascal (kPa).     Fibrosis Stage:  F 0-1  Steatosis interpretation is based on controlled attenuation parameter - (dB/m).     Steatosis Grade:  S3    Assessment/Plan     27 y.o. male with:    1. Abnormal ultrasound  2. Fatty liver  - with RF including BM 32.9, DM2   - recommended nutritionist, declined  - recommend Mediterranean diet with 10%  body weight loss.   - recommend optimal diabetic control. Last a1c worse >10%     3. Elevated liver enzymes  - chronic hepatocellular without hepatic dysfunction  - since at least 2017   - recently better  - ferritin mildly elevated, will ctm.    4. Splenomegaly  - no evidence of fibrosis today, will inform PCP  - no thrombocytopenia  - consider hematology referral if needed         · Labs reviewed. Mild hyperferritinemia, I postulate related to NAFLD. Recommend weight loss and continue blood sugar control.   · Discussed recommend weight loss of 24 lbs with Mediterranean high protein low carb diet. Recommend diabetic control optimal. Nutritionist referral declined.   · Recommend ultrasound in 1 year w/ follow-up thereafter.        I spent 20 minutes on the day of this encounter preparing for, evaluating, treating, and managing this patient.       Thank you for allowing me to participate in the care of LISSETTE AlfordC  Hepatology & Liver Transplant

## 2022-08-02 NOTE — PATIENT INSTRUCTIONS
FATTY LIVER RECOMMENDATIONS:    There is no FDA approved therapy for non-alcoholic fatty liver disease (NAFLD); therefore, lifestyle changes are important:  1. Weight loss goal of 25 lbs  2. Mediterranean diet is recommended that focuses on low-carb, low-sugar, and low-processed foods.  3. Exercise, 5 days per week, 30 minutes per day, as tolerated  4. Recommend good control of cholesterol, blood pressure, blood sugar levels (as these are risk factors for fatty liver). Cholesterol lowering medications including statins are typically safe and beneficial (even if liver enzymes are elevated).    5. Limit alcohol consumption, no more than 0 serving(s) of alcohol in any day (1 serving is 5 ounces of wine, 12 ounces of beer, or 1.5 ounces of liquor). Do not recommend daily alcohol use.        In some people, fatty liver can progress to steatohepatitis (inflammatory fatty liver) and possibly to cirrhosis, putting one at increased risk for liver cancer and liver failure in the future. Lifestyle changes can help to decrease this risk.     If interested in seeing a dietician to create a weight loss plan, contact the dietician team at [facility]: [email or web address].  You can also call to schedule a consult with a dietician: [phone]. *Virtual visits are available with one of our dieticians.     If you have diabetes or high blood pressure, you can meet with a Health  through [facility] program. Let us know if you are interested in a referral.     Ask about our fatty liver/BERNARDO clinical trials if you have fibrosis / scar tissue related to fatty liver.    *If statins are being considered for HLD, statins are safe in patients with liver disease. Statins can be used to treat dyslipidemia in patients with both NAFLD and BERNARDO.      FATTY LIVER DIET TIPS:    ADD OLIVE OIL. I recommend olive oil daily (in salads or when cooking)  ADD COFFEE. Black coffee has also been found to be potentially  beneficial (skip the sweets and creamer). Add 1-2 cups per day,  if you are able to tolerate. Decaf is fine.  BE MINDFUL OF CARBS AND ADDED SUGARS. Try to limit your carb intake to LESS than 30-45 grams of carbs with a meal or LESS than 5-10 grams with any snack (total of any snack foods eaten during that time).   KEEP A FOOD DIARY. Use MyFitness Pal  OR LOSE IT beverley to add up your carbs through the day - the most successful folks on weight loss journey TRACK their progress and food.  LIMIT WHAT YOU ARE DRINKING. Do NOT drink any beverages with calories or carbs (this can lead to high blood sugar and weight gain). Also, some of our patients have been very successful with weight loss using the pre made/planned meal planning services that limit calories and portion size (one example is Sensible Meals but there are many out there). Unsweetened black or green tea is fine! Hibiscus tea is also great and refreshing, especially iced.     Tips for low/moderate carbohydrate diet:  3 meals a day made up of the following:  -- Green vegetables, tomatoes, mushrooms, spaghetti squash, cauliflower, meat, poultry, seafood, eggs   -- Beans (1-1.5 cups) or nuts (1/4 cup): can have 1 x a day.  -- Greek yogurt and fermented foods like kefir, kimchi, saurkraut (be careful if you have swelling issues as lots of salt can worsen swelling or blood pressure)  -- Dressings, seasonings, condiments, etc should have less than 2 g sugars.   -- 1-2 servings of citrus fruits, berries, pineapple or melon a day (1/2 cup)  -- Avoid fried foods  -- Avoid grains, rice, pasta, potatoes, bread, corn, peas, oatmeal, grits, tortillas, crackers, chips  -- No soda, sweet tea, juices or lemonade  -- Use olive/avocado oil rather than vegetable oils or butter.        ADDITIONAL RESOURCES:  Websites with information about fatty liver and inflammation related to fatty liver (BERNARDO): www.nashtruth.PeopleMatter and www.nashactually.com   [facility]: Non-Alcoholic Fatty Liver  Disease (NAFLD)    Facebook support group with tips and recipes:   Non-Alcoholic Fatty Liver Disease (NAFLD) Diet & Nutrition Support     Try www.dietdoctor.[employer] for recipes.

## 2022-08-11 DIAGNOSIS — F41.8 ANXIETY WITH DEPRESSION: ICD-10-CM

## 2022-08-11 DIAGNOSIS — E11.65 TYPE 2 DIABETES MELLITUS WITH HYPERGLYCEMIA, WITHOUT LONG-TERM CURRENT USE OF INSULIN: ICD-10-CM

## 2022-08-11 NOTE — TELEPHONE ENCOUNTER
No new care gaps identified.  [facility] Embedded Care Gaps. Reference number: [id number]. 8/11/2022   8:43:47 AM RUDYT

## 2022-08-12 ENCOUNTER — PATIENT MESSAGE (OUTPATIENT)
Dept: FAMILY MEDICINE | Facility: CLINIC | Age: 27
End: 2022-08-12
Payer: COMMERCIAL

## 2022-08-12 RX ORDER — EMPAGLIFLOZIN 25 MG/1
25 TABLET, FILM COATED ORAL DAILY
Qty: 90 TABLET | Refills: 1 | Status: SHIPPED | OUTPATIENT
Start: 2022-08-12 | End: 2023-07-27 | Stop reason: SDUPTHER

## 2022-08-12 RX ORDER — SERTRALINE HYDROCHLORIDE 100 MG/1
100 TABLET, FILM COATED ORAL DAILY
Qty: 90 TABLET | Refills: 1 | Status: SHIPPED | OUTPATIENT
Start: 2022-08-12 | End: 2022-12-15 | Stop reason: SDUPTHER

## 2022-12-12 ENCOUNTER — PATIENT MESSAGE (OUTPATIENT)
Dept: PSYCHIATRY | Facility: CLINIC | Age: 27
End: 2022-12-12
Payer: COMMERCIAL

## 2022-12-15 ENCOUNTER — OFFICE VISIT (OUTPATIENT)
Dept: PSYCHIATRY | Facility: CLINIC | Age: 27
End: 2022-12-15
Payer: COMMERCIAL

## 2022-12-15 VITALS
WEIGHT: 256.81 LBS | HEART RATE: 74 BPM | HEIGHT: 73 IN | BODY MASS INDEX: 34.04 KG/M2 | DIASTOLIC BLOOD PRESSURE: 87 MMHG | SYSTOLIC BLOOD PRESSURE: 136 MMHG

## 2022-12-15 DIAGNOSIS — R41.840 ATTENTION AND CONCENTRATION DEFICIT: ICD-10-CM

## 2022-12-15 DIAGNOSIS — F33.42 RECURRENT MAJOR DEPRESSIVE DISORDER, IN FULL REMISSION: Primary | ICD-10-CM

## 2022-12-15 DIAGNOSIS — F41.1 GAD (GENERALIZED ANXIETY DISORDER): ICD-10-CM

## 2022-12-15 DIAGNOSIS — F41.8 ANXIETY WITH DEPRESSION: ICD-10-CM

## 2022-12-15 PROCEDURE — 99999 PR PBB SHADOW E&M-EST. PATIENT-LVL III: CPT | Mod: PBBFAC,,,

## 2022-12-15 PROCEDURE — 3008F PR BODY MASS INDEX (BMI) DOCUMENTED: ICD-10-PCS | Mod: CPTII,S$GLB,,

## 2022-12-15 PROCEDURE — 1159F PR MEDICATION LIST DOCUMENTED IN MEDICAL RECORD: ICD-10-PCS | Mod: CPTII,S$GLB,,

## 2022-12-15 PROCEDURE — 1160F PR REVIEW ALL MEDS BY PRESCRIBER/CLIN PHARMACIST DOCUMENTED: ICD-10-PCS | Mod: CPTII,S$GLB,,

## 2022-12-15 PROCEDURE — 1159F MED LIST DOCD IN RCRD: CPT | Mod: CPTII,S$GLB,,

## 2022-12-15 PROCEDURE — 3079F DIAST BP 80-89 MM HG: CPT | Mod: CPTII,S$GLB,,

## 2022-12-15 PROCEDURE — 3079F PR MOST RECENT DIASTOLIC BLOOD PRESSURE 80-89 MM HG: ICD-10-PCS | Mod: CPTII,S$GLB,,

## 2022-12-15 PROCEDURE — 3008F BODY MASS INDEX DOCD: CPT | Mod: CPTII,S$GLB,,

## 2022-12-15 PROCEDURE — 3046F PR MOST RECENT HEMOGLOBIN A1C LEVEL > 9.0%: ICD-10-PCS | Mod: CPTII,S$GLB,,

## 2022-12-15 PROCEDURE — 99214 PR OFFICE/OUTPT VISIT, EST, LEVL IV, 30-39 MIN: ICD-10-PCS | Mod: S$GLB,,,

## 2022-12-15 PROCEDURE — 3075F SYST BP GE 130 - 139MM HG: CPT | Mod: CPTII,S$GLB,,

## 2022-12-15 PROCEDURE — 99214 OFFICE O/P EST MOD 30 MIN: CPT | Mod: S$GLB,,,

## 2022-12-15 PROCEDURE — 3075F PR MOST RECENT SYSTOLIC BLOOD PRESS GE 130-139MM HG: ICD-10-PCS | Mod: CPTII,S$GLB,,

## 2022-12-15 PROCEDURE — 1160F RVW MEDS BY RX/DR IN RCRD: CPT | Mod: CPTII,S$GLB,,

## 2022-12-15 PROCEDURE — 99999 PR PBB SHADOW E&M-EST. PATIENT-LVL III: ICD-10-PCS | Mod: PBBFAC,,,

## 2022-12-15 PROCEDURE — 3046F HEMOGLOBIN A1C LEVEL >9.0%: CPT | Mod: CPTII,S$GLB,,

## 2022-12-15 RX ORDER — SERTRALINE HYDROCHLORIDE 100 MG/1
100 TABLET, FILM COATED ORAL DAILY
Qty: 90 TABLET | Refills: 1 | Status: SHIPPED | OUTPATIENT
Start: 2022-12-15 | End: 2023-03-15

## 2022-12-15 RX ORDER — BUPROPION HYDROCHLORIDE 300 MG/1
300 TABLET ORAL DAILY
Qty: 90 TABLET | Refills: 0 | Status: SHIPPED | OUTPATIENT
Start: 2022-12-15 | End: 2023-07-27 | Stop reason: SDUPTHER

## 2022-12-15 NOTE — PROGRESS NOTES
"   Outpatient Psychiatry Follow-Up Visit (MD/NP)    12/15/2022    Clinical Status of Patient:  Outpatient (Ambulatory)    Chief Complaint:  Roberto Carlos Souza is a 27 y.o. male who presents today for follow-up of depression and anxiety.  Met with patient.      Interval History and Content of Current Session:  Interim Events/Subjective Report/Content of Current Session:   Pt is a 27 y.o. male with past history of MDD and MARCOS who presents for follow up treatment. Pt established care with me on 11/24/21, where Pt met criteria for MDD and MARCOS. Pt is currently taking Zoloft 100 mg po daily, Wellbutrin  mg q.a.m., and buspirone 5-7.5 mg p.o. t.i.d. p.r.n. anxiety.  Prior psychotropic trials include Lexapro.    Patient's most recent visit was 12/22/2021. Over the interim patient was seen by his primary care provider for increased anxiety and sertraline was increased from  mg per day.  Buspirone 5-7.5 mg p.o. t.i.d. as needed for anxiety was started at that time as well.  To day, patient reports mood and anxiety are well controlled with current medication regimen.  He does however, report sluggishness and a lack of motivation and states his Focus has been kind of shot for a while. It's not easy to focus.  I'd rather be doing nothing."  Patient states difficulty with attention and concentration has been lifelong and requests evaluation for further clarification on a possible ADHD diagnosis.  Patient also reports some situational anxiety related to his wife being 6 weeks pregnant.  He notes his wife's best friend recently had a miscarriage and the patient and his wife are hyper aware of this possibility.      ADHD Screening:  Trouble paying close attention to details, or careless mistakes: outside of work  Difficulty sustaining attention/remaining focused: yes  Absent minded/wandeing thoughts during conversation: yes  Doesn't follow through on instructions, starts tasks but does not finish or easily distracted: yes " "  Difficulty with organizing: "I'm terrible, house is a mess"  Avoids/dislikes tasks that require sustained attention: doesn't avoid but doesn't seek out  Looses important things: yes, screwdrivers (most important tool for work)   Easily distracted by extraneous stimuli: occasionally  Forgetful in daily activities: yes  ------  Often fidgets/squirms: yes, fidgets, I move my legs a lot  Often leaves seat at inappropriate times: "No, but that's because I was terrified of my mom"  Runs around, climbing on things or feeling restless: ne  Unable to engage in leisure activities: no  Often "on the go" , motor driven: no  Often talks excessively: no, "Way less"  Blurts out, interrupts: frequently cuts wife off while speaking  Can't wait turn: occasionally  Often interrupts/intrudes: yes        Psych Review of Symptoms:  Pt reports symptoms marked with a check and denies those unmarked  Depression: [] depressed mood, [] anhedonia, [x] Lack of motivation, [] appetite/weight changes, []insomnia, [] hypersomnia, [x] fatigue, [x] concentration difficulty, [] psychomotor slowing or agitation, [] feelings of worthlessness, [] hopelessness, or [] guilt, or [] recurrent thoughts of death or [] SI  Josefina: [] episodes of expansive mood, [] decreased need for sleep, [] increased goal directed behaviors, [] racing thoughts, [x] distractibility, [] indiscretion, [] grandiosity, [] pressured speech   Anxiety: [] excessive anxiety or worry, [] panic attacks, [] restlessness, [] irritability, [] social anxiety, [] phobias, [] avoidance, [] agoraphobia, [] somatic related complaints, [] dissociative episodes   OCD: [] obsessions [] compulsive behaviors, [] hyper startle response   PTSD: [] nightmares, [] avoidance of stimuli  Psychosis: [] A/V hallucinations, [] Delusions, [] paranoia    SI/HI: [] suicidal ideation, [] plan, [] thoughts of harm to self or others, [] SIB      12/22/2021: Today, Pt notes symptoms have improved since initial " "visit. He increased Zoloft from 25 to 50 mg po daily ~1 week ago. He reports his mood is, "Better. It's pretty good. It's leveling out, I haven't really had that many lows." He denies anhedonia and states he has been able to occasionally participate in hobbies. He reports decreased feelings of guilt and worthlessness. States his ability to concentrate is better, "Less foggy." Reports his sleep is unchanged and he still gets ~8 hours per night. He reports a decrease in fatigue. He denies thoughts of self harm or SI. He reports he still worries about finances and his wife but states he no longer feels that "Anything that can go wrong will." He does still report occasional irritability.     Initial HPI:   Bc. is a 26 y.o. male, with a past psychiatric hx of depression and anxiety presenting to the clinic for an initial evaluation and treatment. PMHx outlined below. Pt is currently taking Prozac 20 mg daily and Wellbutrin  mg qam. Pt has taken these medications at various doses since 2015. Prozac was increased to 20 mg daily on 5/28/21 and Wellbutrin XL was increased to 300 mg on 10/5/21. Pt denies any improvement in symptoms with dose increases, and in fact, reports worsening of symptoms over the last 2-3 months, with "Way more bad days" than good. He requests a change in his medications.     He reports his first episode of depression was at age 10 when his mother was diagnosed with breast cancer, however, he was not treated at this time. He again became depressed around age 16 when he left for college as he felt he was forced by his parents into going to college. He was first treated for depression at age 20, when, "I hit rock bottom, and I wasn't responding to anything." He was started on Lexapro 10 mg daily by his PCP, however he reports no change in symptoms so he was switched to Prozac.     He states his depressive symptoms have been worsening slowly since the beginning of the pandemic and markedly worsened " "2-3 months ago. He reports depressed mood, stating that now, "I have an exteremly bleak outlook. When I'm down its for at least 24 hours and there's nothing I can do to recover," whereas before, "I used to be able to take control by taking a few minutes," to lift his mood. He reports anhedonia, stating he has been unable to enjoy his usual hobbies recently, "I've just been sitting on the couch and being sad." He reports feelings of guilt and worthlessness and decreased ability to concentrate. He denies changes in sleep or appetite, stating he gets around 8 hours of sleep per night, but reports fatigue, "I am exhausted when I get home from work now." He denies SI/plan/intent, but states he feels sad because he feels like he, "Chose the wrong path in life," such as work and friendships. He reports significant anxiety and worry that he is unable to control about multiple areas of life including his wife, finances, and a general outlook of, "Anything that can go wrong will." He is "Always on edge" about his wife and reports occasional irritability.    Past Psychiatric History:  First psych contact: at 20 for depression and anxiety    Prior hospitalizations: denies  Prior suicide attempts or self-harm: denies  Prior diagnosis: MDD, MARCOS  Prior meds: Lexapro  Current meds: Prozac 20 mg daily and Wellbutrin  mg qam  Prior psychotherapy: individual psychotherapy @ [age], recently with Dr. Nettles     Past Medical hx:   Past Medical History:   Diagnosis Date    Anxiety     Diabetes mellitus, type 2     2015 dx.    Dyslipidemia     Hypertension     lisinopril in past    Obesity    Hx of TBI: denies  Hx of seizures: denies    Family Hx:   Paternal: no psychiatric history or history of substance abuse or suicide  Maternal: mom probable ADHD;  no history of substance abuse or suicide     Social Hx:   Childhood: born and raised in [place], [place]  Marital Status:  for 2 years   Children: denies  Resides: " [place]  Occupation: voltage   Hobbies: video games, card and board games    Pentecostal: denies  Education level: Associates degree from [employer]  :  denies  Legal: denies     Substance Hx:  Caffeine: diet coke 3-4 daily  Tobacco: denies  Alcohol: ~1 / month, ~2 beers per occasion  Drug use: denies  Rehab: denies  Prior/current AA: denies      Interim hx:     Medication changes last visit: 12/22/2021  Continue Zoloft 50 mg po daily  Continue Wellbutrin  mg q.a.m.     Medication changes 11/24/2021:  D/c Prozac 20 mg po daily  -start Zoloft 25 mg po daily x 2 weeks then increase to 50 mg po daily  -continue Wellbutrin  mg po q.a.m.      Alcohol/Drugs/Caffeine/Tobacco: No change in consumption    Review of Systems   PSYCHIATRIC: Pertinant items are noted in the narrative.  CONSTITUTIONAL: weight stable  MSK: no pain  ENT: no changes in hearing  ABD: no n/v/d       Past Medical, Family and Social History: The patient's past medical, family and social history have been reviewed and updated as appropriate within the electronic medical record - see encounter notes.    Adherence:  Psychotropic medications changed by PCP over the the interim    Side effects:  Denies    Risk Parameters:  Patient reports no suicidal ideation  Patient reports no homicidal ideation  Patient reports no self-injurious behavior  Patient reports no violent behavior    Exam   Constitutional  Vitals:  Most recent vital signs, dated less than 90 days prior to this appointment, were reviewed.   Last 3 sets of Vitals    Vitals - 1 value per visit 7/19/2022 12/15/2022 12/15/2022   SYSTOLIC 129 - 136   DIASTOLIC 83 - 87   Pulse 69 - 74   Temp 98.4 - -   Resp 18 - -   SPO2 97 - -   Weight (lb) 249.34 - 256.84   Weight (kg) 113.1 - 116.5   Height 73 - 73   BMI (Calculated) 32.9 - 33.9   VISIT REPORT - - -   Pain Score  - 0 -   Some recent data might be hidden          General:  unremarkable, age appropriate      Musculoskeletal  Muscle Strength/Tone:  no rigidity, no flaccidity, no dystonia, no tic   Gait & Station:  non-ataxic     Psychiatric  Speech:  no latency; no press   Mood & Affect:  euthymic  congruent and appropriate   Thought Process:  normal and logical   Associations:  intact   Thought Content:  normal, no suicidality, no homicidality, delusions, or paranoia   Insight:  intact   Judgement: behavior is adequate to circumstances   Orientation:  grossly intact   Memory: intact for content of interview   Language: grossly intact   Attention Span & Concentration:  able to focus   Fund of Knowledge:  intact and appropriate to age and level of education     Suicide Risk Assessment:  Protective factors: age, gender, no prior attempts, no prior hospitalizations, no ongoing substance abuse, no psychosis, , denies SI/intent/plan, seeking treatment, access to treatment, future oriented, good primary support, no access to firearms  Risks: ongoing anxiety and depression  Patient is a low immediate and long-term risk considering risk factors       Assessment and Diagnosis   Status/Progress: Based on the examination today, the patient's problem(s) is/are improved.  New problems have not been presented today.   Co-morbidities are not complicating management of the primary condition.  There are no active rule-out diagnoses for this patient at this time.     General Impression: Pt is a 27 y.o. male with past history of MDD and MARCOS who presents today for f/u.  Patient's last visit with me was approximately 1 year ago on 12/22/2021.  Patient was seen by PCP approximately 5 months ago for increased anxiety and Zoloft was increased and buspirone was initiated at that time.  Patient reports mood and anxiety are well controlled but notes continued difficulty with attention and concentration and requests evaluation for ADHD.  Referral placed to Psychology for evaluation of attention and concentration deficit.  Current  pharmacological intervention will be continued at this time.    Encounter Diagnoses   Name Primary?    Recurrent major depressive disorder, in full remission Yes    MARCOS (generalized anxiety disorder)     Anxiety with depression     Attention and concentration deficit          Intervention/Counseling/Treatment Plan   Medication Management: The risks and benefits of medication were discussed with the patient. Shared decision making occurred   The treatment plan and follow up plan were reviewed with the patient.     Continue Zoloft 100 mg po daily  Continue Wellbutrin  mg q.a.m.   Continue buspirone 5-7.5 mg p.o. t.i.d. p.r.n. anxiety  Referral placed to Psychology for evaluation of attention concentration deficit    Offered referral for individual psychotherapy.  Call to report any worsening of symptoms or problems with the medication. Pt instructed to go to ER with thoughts of harming self, others  Labs: no new orders      Return to Clinic: 3 months or sooner as needed    -Pt given contact number for psychotherapists at [facility] and also instructed they may check with insurance for a list of providers  -Spent 30min face to face with the pt; >50% time spent in counseling   -Supportive therapy and psychoeducation provided  -R/B/SE's of medications discussed with the pt who expresses understanding and chooses to take medications as prescribed.   -Pt instructed to call clinic, 911 or go to nearest emergency room if sxs worsen or pt is in   crisis. The pt expresses understanding.    ESTELLA Becerril, PMHNP-BC  Department of Psychiatry - [facility]  [address]  [place] [id number]  Office: [phone]  Fax: [phone]

## 2022-12-31 ENCOUNTER — PATIENT MESSAGE (OUTPATIENT)
Dept: FAMILY MEDICINE | Facility: CLINIC | Age: 27
End: 2022-12-31
Payer: COMMERCIAL

## 2023-01-03 ENCOUNTER — PATIENT MESSAGE (OUTPATIENT)
Dept: FAMILY MEDICINE | Facility: CLINIC | Age: 28
End: 2023-01-03
Payer: COMMERCIAL

## 2023-01-17 ENCOUNTER — PATIENT MESSAGE (OUTPATIENT)
Dept: ADMINISTRATIVE | Facility: HOSPITAL | Age: 28
End: 2023-01-17
Payer: COMMERCIAL

## 2023-01-24 ENCOUNTER — PATIENT OUTREACH (OUTPATIENT)
Dept: ADMINISTRATIVE | Facility: HOSPITAL | Age: 28
End: 2023-01-24
Payer: COMMERCIAL

## 2023-01-24 NOTE — PROGRESS NOTES
2023 Care Everywhere updates requested and reviewed.  Immunizations reconciled. Media reports reviewed.  Duplicate HM overrides and  orders removed.  Overdue HM topic chart audit and/or requested.  Overdue lab testing linked to upcoming lab appointments if applies.  Lab quentin, and [employer] reviewed  Lab testing     Labs scheduled 2023    Health Maintenance Due   Topic Date Due    Pneumococcal Vaccines (Age 0-64) (1 - PCV) Never done    HIV Screening  Never done    Foot Exam  2020    COVID-19 Vaccine (3 - Booster for Pfizer series) 2021    Diabetes Urine Screening  2022    Influenza Vaccine (1) 2022    Hemoglobin A1c  10/05/2022

## 2023-02-04 ENCOUNTER — LAB VISIT (OUTPATIENT)
Dept: LAB | Facility: HOSPITAL | Age: 28
End: 2023-02-04
Attending: INTERNAL MEDICINE
Payer: COMMERCIAL

## 2023-02-04 DIAGNOSIS — E11.65 TYPE 2 DIABETES MELLITUS WITH HYPERGLYCEMIA, WITHOUT LONG-TERM CURRENT USE OF INSULIN: ICD-10-CM

## 2023-02-04 DIAGNOSIS — Z91.119 DIETARY NONCOMPLIANCE: ICD-10-CM

## 2023-02-04 LAB
ANION GAP SERPL CALC-SCNC: 12 MMOL/L (ref 8–16)
BUN SERPL-MCNC: 15 MG/DL (ref 6–20)
CALCIUM SERPL-MCNC: 9.7 MG/DL (ref 8.7–10.5)
CHLORIDE SERPL-SCNC: 104 MMOL/L (ref 95–110)
CO2 SERPL-SCNC: 22 MMOL/L (ref 23–29)
CREAT SERPL-MCNC: 0.8 MG/DL (ref 0.5–1.4)
EST. GFR  (NO RACE VARIABLE): >60 ML/MIN/1.73 M^2
ESTIMATED AVG GLUCOSE: 226 MG/DL (ref 68–131)
GLUCOSE SERPL-MCNC: 233 MG/DL (ref 70–110)
HBA1C MFR BLD: 9.5 % (ref 4–5.6)
POTASSIUM SERPL-SCNC: 4.3 MMOL/L (ref 3.5–5.1)
SODIUM SERPL-SCNC: 138 MMOL/L (ref 136–145)

## 2023-02-04 PROCEDURE — 36415 COLL VENOUS BLD VENIPUNCTURE: CPT | Mod: PO | Performed by: INTERNAL MEDICINE

## 2023-02-04 PROCEDURE — 80048 BASIC METABOLIC PNL TOTAL CA: CPT | Performed by: INTERNAL MEDICINE

## 2023-02-04 PROCEDURE — 83036 HEMOGLOBIN GLYCOSYLATED A1C: CPT | Performed by: INTERNAL MEDICINE

## 2023-02-07 ENCOUNTER — OFFICE VISIT (OUTPATIENT)
Dept: FAMILY MEDICINE | Facility: CLINIC | Age: 28
End: 2023-02-07
Payer: COMMERCIAL

## 2023-02-07 DIAGNOSIS — E11.65 TYPE 2 DIABETES MELLITUS WITH HYPERGLYCEMIA, WITHOUT LONG-TERM CURRENT USE OF INSULIN: Primary | ICD-10-CM

## 2023-02-07 PROBLEM — E66.09 NON MORBID OBESITY DUE TO EXCESS CALORIES: Status: RESOLVED | Noted: 2017-05-23 | Resolved: 2023-02-07

## 2023-02-07 PROBLEM — E78.5 DYSLIPIDEMIA: Chronic | Status: ACTIVE | Noted: 2019-11-20

## 2023-02-07 PROBLEM — F33.42 RECURRENT MAJOR DEPRESSIVE DISORDER, IN FULL REMISSION: Status: RESOLVED | Noted: 2017-09-13 | Resolved: 2023-02-07

## 2023-02-07 PROBLEM — F33.1 MODERATE EPISODE OF RECURRENT MAJOR DEPRESSIVE DISORDER: Chronic | Status: ACTIVE | Noted: 2021-11-24

## 2023-02-07 PROBLEM — E66.9 OBESITY (BMI 30-39.9): Status: RESOLVED | Noted: 2019-11-20 | Resolved: 2023-02-07

## 2023-02-07 PROCEDURE — 1160F PR REVIEW ALL MEDS BY PRESCRIBER/CLIN PHARMACIST DOCUMENTED: ICD-10-PCS | Mod: CPTII,95,, | Performed by: STUDENT IN AN ORGANIZED HEALTH CARE EDUCATION/TRAINING PROGRAM

## 2023-02-07 PROCEDURE — 1159F PR MEDICATION LIST DOCUMENTED IN MEDICAL RECORD: ICD-10-PCS | Mod: CPTII,95,, | Performed by: STUDENT IN AN ORGANIZED HEALTH CARE EDUCATION/TRAINING PROGRAM

## 2023-02-07 PROCEDURE — 3066F PR DOCUMENTATION OF TREATMENT FOR NEPHROPATHY: ICD-10-PCS | Mod: CPTII,95,, | Performed by: STUDENT IN AN ORGANIZED HEALTH CARE EDUCATION/TRAINING PROGRAM

## 2023-02-07 PROCEDURE — 1160F RVW MEDS BY RX/DR IN RCRD: CPT | Mod: CPTII,95,, | Performed by: STUDENT IN AN ORGANIZED HEALTH CARE EDUCATION/TRAINING PROGRAM

## 2023-02-07 PROCEDURE — 99214 PR OFFICE/OUTPT VISIT, EST, LEVL IV, 30-39 MIN: ICD-10-PCS | Mod: 95,,, | Performed by: STUDENT IN AN ORGANIZED HEALTH CARE EDUCATION/TRAINING PROGRAM

## 2023-02-07 PROCEDURE — 3060F POS MICROALBUMINURIA REV: CPT | Mod: CPTII,95,, | Performed by: STUDENT IN AN ORGANIZED HEALTH CARE EDUCATION/TRAINING PROGRAM

## 2023-02-07 PROCEDURE — 3066F NEPHROPATHY DOC TX: CPT | Mod: CPTII,95,, | Performed by: STUDENT IN AN ORGANIZED HEALTH CARE EDUCATION/TRAINING PROGRAM

## 2023-02-07 PROCEDURE — 3060F PR POS MICROALBUMINURIA RESULT DOCUMENTED/REVIEW: ICD-10-PCS | Mod: CPTII,95,, | Performed by: STUDENT IN AN ORGANIZED HEALTH CARE EDUCATION/TRAINING PROGRAM

## 2023-02-07 PROCEDURE — 3046F HEMOGLOBIN A1C LEVEL >9.0%: CPT | Mod: CPTII,95,, | Performed by: STUDENT IN AN ORGANIZED HEALTH CARE EDUCATION/TRAINING PROGRAM

## 2023-02-07 PROCEDURE — 3072F LOW RISK FOR RETINOPATHY: CPT | Mod: CPTII,95,, | Performed by: STUDENT IN AN ORGANIZED HEALTH CARE EDUCATION/TRAINING PROGRAM

## 2023-02-07 PROCEDURE — 3072F PR LOW RISK FOR RETINOPATHY: ICD-10-PCS | Mod: CPTII,95,, | Performed by: STUDENT IN AN ORGANIZED HEALTH CARE EDUCATION/TRAINING PROGRAM

## 2023-02-07 PROCEDURE — 99214 OFFICE O/P EST MOD 30 MIN: CPT | Mod: 95,,, | Performed by: STUDENT IN AN ORGANIZED HEALTH CARE EDUCATION/TRAINING PROGRAM

## 2023-02-07 PROCEDURE — 1159F MED LIST DOCD IN RCRD: CPT | Mod: CPTII,95,, | Performed by: STUDENT IN AN ORGANIZED HEALTH CARE EDUCATION/TRAINING PROGRAM

## 2023-02-07 PROCEDURE — 3046F PR MOST RECENT HEMOGLOBIN A1C LEVEL > 9.0%: ICD-10-PCS | Mod: CPTII,95,, | Performed by: STUDENT IN AN ORGANIZED HEALTH CARE EDUCATION/TRAINING PROGRAM

## 2023-02-07 RX ORDER — DULAGLUTIDE 0.75 MG/.5ML
0.75 INJECTION, SOLUTION SUBCUTANEOUS
Qty: 4 PEN | Refills: 0 | Status: SHIPPED | OUTPATIENT
Start: 2023-02-07 | End: 2023-02-28

## 2023-02-07 NOTE — PROGRESS NOTES
Assessment and Plan:    Diagnoses and all orders for this visit:    Type 2 diabetes mellitus with hyperglycemia, without long-term current use of insulin: Continue glimepiride 2 mg BID, Jardiance 25 mg daily, Lipitor 10 mg daily. Add Trulicity as below. Counseled on dietary and lifestyle modifications.   -     dulaglutide (TRULICITY) 0.75 mg/0.5 mL pen injector; Inject 0.75 mg into the skin every 7 days.    Follow up in about 4 weeks (around 3/7/2023), or if symptoms worsen or fail to improve.    Kylah Zamudio MD  02/07/2023     Audiovisual Telehealth Visit:     The patient location is: Home  The chief complaint leading to consultation is: (documented below in HPI)  Visit type: Virtual visit with audiovisual  Total time spent on this encounter: 20 minutes.This includes face to face time and non-face to face time preparing to see the patient (eg, review of tests), obtaining and/or reviewing separately obtained history, documenting clinical information in the electronic or other health record, independently interpreting results and communicating results to the patient/family/caregiver, or care coordinator.       Each patient to whom I provide medical services by telemedicine is: (1) informed of the relationship between the physician and patient and the respective role of any other health care provider with respect to management of the patient; and (2) notified that they may decline to receive medical services by telemedicine and may withdraw from such care at any time. Patient verbally consented to receive this service via audiovisual call.    Patient ID: Roberto Carlos Souza is a 27 y.o. male     HPI: 27 y.o. male with a PMHx as documented below presents to clinic today (via audiovisual telehealth visit) for the following:    DMT2:   - Glimepiride 2 mg BID, Jardiance 25 mg daily   - Lipitor 10 mg daily  - Hgb A1c 9.5 (2/4/23), down from A1c 10.5 months ago (7/5/22)  - Microalb/creat ratio 89.6, elevated  - Pt reports  not being able to tolerate immediate or extended release metformin in the past 2/2 side effects  - Pt states he tried another medication in the past but unable to remember name/why it was discontinued at time of today's appointment  - Pt reports eating unhealthy diet 2/2 work limitations and schedule, working with his wife to make dietary/lifestyle changes together  - Counseled on small changes to help start dietary modifications    Past Medical History:   Diagnosis Date    Anxiety     Diabetes mellitus, type 2 2015    Dyslipidemia     Hypertension     Previously on lisinopril    Obesity      ROS  Constitutional:  Negative for chills and fever.   Respiratory:  Negative for shortness of breath.    Cardiovascular:  Negative for chest pain.   Gastrointestinal:  Negative for abdominal pain, constipation, diarrhea, nausea and vomiting.      Physical Exam:   Constitutional:       General: She is not in acute distress.  HENT:      Head: Normocephalic and atraumatic.   Pulmonary:      Effort: Pulmonary effort is normal. No respiratory distress.   Neurological:      General: No focal deficit present.      Mental Status: She is alert and oriented to person, place, and time. Mental status is at baseline.      Assessment and Plan:   See above    Kylah Zamudio MD  [facility]  Office: [phone]   Fax: [phone]  02/07/2023       Disclaimer: This note was partly generated using dictation software which may occasionally result in transcription errors.

## 2023-02-20 ENCOUNTER — PATIENT MESSAGE (OUTPATIENT)
Dept: FAMILY MEDICINE | Facility: CLINIC | Age: 28
End: 2023-02-20
Payer: COMMERCIAL

## 2023-03-09 ENCOUNTER — PATIENT MESSAGE (OUTPATIENT)
Dept: FAMILY MEDICINE | Facility: CLINIC | Age: 28
End: 2023-03-09
Payer: COMMERCIAL

## 2023-03-09 DIAGNOSIS — E11.65 TYPE 2 DIABETES MELLITUS WITH HYPERGLYCEMIA, WITHOUT LONG-TERM CURRENT USE OF INSULIN: ICD-10-CM

## 2023-03-09 RX ORDER — DULAGLUTIDE 0.75 MG/.5ML
0.75 INJECTION, SOLUTION SUBCUTANEOUS
Qty: 4 PEN | Refills: 2 | Status: CANCELLED | OUTPATIENT
Start: 2023-03-09 | End: 2023-04-08

## 2023-03-09 NOTE — TELEPHONE ENCOUNTER
No new care gaps identified.  [facility] Embedded Care Gaps. Reference number: [id number]. 3/09/2023   7:36:45 AM CST

## 2023-03-13 ENCOUNTER — PATIENT OUTREACH (OUTPATIENT)
Dept: ADMINISTRATIVE | Facility: HOSPITAL | Age: 28
End: 2023-03-13
Payer: COMMERCIAL

## 2023-03-13 DIAGNOSIS — E78.5 DYSLIPIDEMIA: Chronic | ICD-10-CM

## 2023-03-13 DIAGNOSIS — E11.9 DIABETES MELLITUS WITHOUT COMPLICATION: Primary | ICD-10-CM

## 2023-03-13 NOTE — PROGRESS NOTES
"OUTPATIENT PSYCHIATRY FOLLOW UP VISIT    Encounter Date: 3/15/2023    Clinical Status of Patient:  Outpatient (Virtual)  The patient location is: [address] [place] [address]  The patient phone number is: [phone]   Visit type: Virtual visit with synchronous audio and video  Each patient to whom he or she provides medical services by telemedicine is:  (1) informed of the relationship between the practitioner and patient and the respective role of any other health care provider with respect to management of the patient; and (2) notified that he or she may decline to receive medical services by telemedicine and may withdraw from such care at any time.    Chief Complaint:  Roberto Carlos Souza is a 27 y.o. male who presents today for follow-up.  Met with patient.      HISTORY OF PRESENTING ILLNESS:  Roberto Carlos Souza is a 27 y.o. male with history of MDD and MARCOS who presents for follow up appointment.      Plan at last appointment on 12/15/2022:  Continue Zoloft 100 mg po daily  Continue Wellbutrin  mg q.a.m.   Continue buspirone 5-7.5 mg p.o. t.i.d. p.r.n. anxiety  Referral placed to Psychology for evaluation of attention concentration deficit    Psychotropic medication history:   Lexapro    HPI as told by patient:  Patient states his mood has been on and off, wavering." He denies depressed mood but reports he has been, Checked out of everything, not interested in doing normal hobbies, and I don't really listen." He reports worsening difficulty with concentration.      Pt reports increased anxiety.  He and his wife are expecting their 1st child "and that's terrifying." INDERJIT is August 10th.  The patient reports that the baby is starting to move and it is becoming real in my mind.  Am I actually ready for this?  We been talking about it for years and now it is real.   Patient's site economic stability as a stressor and also states he believes he does not measure up to his own father in terms of parenting " ability.  Encouraged patient to engage talk through these feelings of inadequacy.    Patient reports he has not taken BuSpar over the interim.  He also notes he forgot to schedule further psychological evaluation for his attention and concentration deficit and notes he would like to schedule this in the future.    No interval episodes with symptoms consistent with benny or hypomania.  Denied interval or current suicidal/homicidal thoughts, intent, or plan or NSSI.  Denied other questions and concerns.      Medication side effects: None  Medication adherence: yes      PSYCHIATRIC REVIEW OF SYSTEMS:  Is patient experiencing or having changes in:  Trouble with sleep:  no  Appetite changes:  decreased, has been taking Trulicity for DM  Weight changes:  no  Lack of energy:  no  Anhedonia:  yes, not interested in usual activities  Somatic symptoms:  no  Anxiety/panic:  increased  Irritability: minor increase, a lot of littler things are getting to me when they used to not  Guilty/hopeless:  little bit of worthlessness  Concentration:  Patient reports his ability to concentrate has worsened recently  Racing thoughts: no  Impulsive behaviors: no  Paranoia/AVH: no  Self-injurious behavior/risky behavior:  no  Any drugs:  no  Alcohol:  ~1 / month, ~2 beers per occasion    MEDICAL REVIEW OF SYSTEMS:   Pain: Denies any significant chronic or acute pain.  Constitutional: Denies fever or change in appetite.  Cardiovascular: Denies chest pain or exertional dyspnea.  Respiratory: Artemio cough or orthopnea.   GI: Denies abdominal pain, N/V  Neurological: Denies tremor, seizure, or focal weakness.  Psychiatric: See HPI above.    PAST PSYCHIATRIC, MEDICAL, AND SOCIAL HISTORY REVIEWED  The patient's past medical, family and social history have been reviewed and updated as appropriate within the electronic medical record - see encounter notes.    PAST MEDICAL HISTORY:   Past Medical History:   Diagnosis Date    Anxiety     Diabetes  "mellitus, type 2 2015    Dyslipidemia     Hypertension     Previously on lisinopril    Obesity      Head trauma/Loss of consciousness: denies  Seizures: denies     PAST PSYCHIATRIC HISTORY:  First psych contact: at 20 for depression and anxiety    Prior hospitalizations: denies  Prior suicide attempts or self-harm: denies  Prior diagnosis: MDD, MARCOS  Prior meds: Lexapro  Current meds: Prozac 20 mg daily and Wellbutrin  mg qam  Prior psychotherapy: individual psychotherapy @ [age], recently with Dr. Nettles     FAMILY HISTORY:   Paternal: no psychiatric history or history of substance abuse or suicide  Maternal: mom probable ADHD;  no history of substance abuse or suicide     SOCIAL HISTORY:   Childhood: born and raised in [place], [place]  Marital Status:  for 2 years   Children: denies  Resides: [place]  Occupation: voltage   Hobbies: video games, card and board games    Advent: denies  Education level: Associates degree from [employer]  :  denies  Legal: denies    SUBSTANCE USE HISTORY:  Caffeine: diet coke 3-4 daily  Tobacco: denies  Alcohol: ~1 / month, ~2 beers per occasion  Drug use: denies  Rehab: denies  Prior/current AA: denies    INITIAL HPI:   Pt. is a 26 y.o. male, with a past psychiatric hx of depression and anxiety presenting to the clinic for an initial evaluation and treatment. PMHx outlined below. Pt is currently taking Prozac 20 mg daily and Wellbutrin  mg qam. Pt has taken these medications at various doses since 2015. Prozac was increased to 20 mg daily on 5/28/21 and Wellbutrin XL was increased to 300 mg on 10/5/21. Pt denies any improvement in symptoms with dose increases, and in fact, reports worsening of symptoms over the last 2-3 months, with "Way more bad days" than good. He requests a change in his medications.     He reports his first episode of depression was at age 10 when his mother was diagnosed with breast cancer, however, he was not treated " "at this time. He again became depressed around age 16 when he left for college as he felt he was forced by his parents into going to college. He was first treated for depression at age 20, when, "I hit rock bottom, and I wasn't responding to anything." He was started on Lexapro 10 mg daily by his PCP, however he reports no change in symptoms so he was switched to Prozac.     He states his depressive symptoms have been worsening slowly since the beginning of the pandemic and markedly worsened 2-3 months ago. He reports depressed mood, stating that now, "I have an exteremly bleak outlook. When I'm down its for at least 24 hours and there's nothing I can do to recover," whereas before, "I used to be able to take control by taking a few minutes," to lift his mood. He reports anhedonia, stating he has been unable to enjoy his usual hobbies recently, "I've just been sitting on the couch and being sad." He reports feelings of guilt and worthlessness and decreased ability to concentrate. He denies changes in sleep or appetite, stating he gets around 8 hours of sleep per night, but reports fatigue, "I am exhausted when I get home from work now." He denies SI/plan/intent, but states he feels sad because he feels like he, "Chose the wrong path in life," such as work and friendships. He reports significant anxiety and worry that he is unable to control about multiple areas of life including his wife, finances, and a general outlook of, "Anything that can go wrong will." He is "Always on edge" about his wife and reports occasional irritability.      MEDICATIONS:    Current Outpatient Medications:     atorvastatin (LIPITOR) 10 MG tablet, Take 1 tablet (10 mg total) by mouth once daily., Disp: 90 tablet, Rfl: 1    blood sugar diagnostic (CONTOUR NEXT STRIPS) Strp, 1 each by Misc.(Non-Drug; Combo Route) route once daily., Disp: 30 each, Rfl: 11    buPROPion (WELLBUTRIN XL) 300 MG 24 hr tablet, Take 1 tablet (300 mg total) by mouth " once daily., Disp: 90 tablet, Rfl: 0    busPIRone (BUSPAR) 5 MG Tab, Take 5-7.5 mg p.o. t.i.d. as needed for anxiety.  Generic please, Disp: 30 tablet, Rfl: 1    dulaglutide (TRULICITY) 0.75 mg/0.5 mL pen injector, INJECT 0.75 MG INTO THE SKIN EVERY 7 DAYS., Disp: 4 pen, Rfl: 2    empagliflozin (JARDIANCE) 25 mg tablet, Take 1 tablet (25 mg total) by mouth once daily., Disp: 90 tablet, Rfl: 1    glimepiride (AMARYL) 2 MG tablet, TAKE 1 TABLET BY MOUTH TWICE DAILY WITH BREAKFAST AND DINNER, Disp: 180 tablet, Rfl: 1    lancets (MICROLET LANCET) Misc, 1 each by Misc.(Non-Drug; Combo Route) route 3 (three) times daily., Disp: 100 each, Rfl: 11    ondansetron (ZOFRAN) 4 MG tablet, Take 1 tablet (4 mg total) by mouth every 6 (six) hours as needed for Nausea (Or vomiting as needed)., Disp: 15 tablet, Rfl: 1    pantoprazole (PROTONIX) 40 MG tablet, Take 40 mg p.o. daily as needed for GI upset, reflux or abdominal pain, Disp: 90 tablet, Rfl: 1    sertraline (ZOLOFT) 100 MG tablet, Take 1.5 tablets (150 mg total) by mouth once daily. Generic please, Disp: 45 tablet, Rfl: 2    ALLERGIES:  Review of patient's allergies indicates:   Allergen Reactions    Metformin      Discontinued with hematochezia; symptoms markedly improved but still persistent; remain off metformin    Rybelsus [semaglutide] Other (See Comments)     abdominal pain, nausea       EXAM:  Constitutional  Vitals:  Most recent vital signs were reviewed.   Last 3 sets of VS:  Vitals - 1 value per visit 7/19/2022 12/15/2022 12/15/2022   SYSTOLIC 129 - 136   DIASTOLIC 83 - 87   Pulse 69 - 74   Temp 98.4 - -   Resp 18 - -   SPO2 97 - -   Weight (lb) 249.34 - 256.84   Weight (kg) 113.1 - 116.5   Height 73 - 73   BMI (Calculated) 32.9 - 33.9   VISIT REPORT - - -   Pain Score  - 0 -   Some recent data might be hidden      General:  unremarkable, age appropriate     Musculoskeletal  Muscle Strength/Tone:  No tremor or abnormal movements   Gait & Station:  Steady,  non-ataxic     Psychiatric  Speech:  no latency; no press   Mood & Affect:  anxious, dysthymic  congruent and appropriate   Thought Process:  normal and logical   Associations:  intact   Thought Content:  normal, no suicidality, no homicidality, delusions, or paranoia   Insight:  intact   Judgement: behavior is adequate to circumstances   Orientation:  grossly intact   Memory: intact for content of interview   Language: grossly intact   Attention Span & Concentration:  able to focus   Fund of Knowledge:  intact and appropriate to age and level of education     SUICIDE RISK ASSESSMENT:  Protective factors: age, gender, no prior attempts, no prior hospitalizations, no ongoing substance abuse, no psychosis, , wife is currently pregnant with their 1st child, denies SI/intent/plan, seeking treatment, access to treatment, future oriented, good primary support, no access to firearms  Risks: ongoing anxiety and depression  Patient is a low immediate and long-term risk considering risk factors    RELEVANT LABS/STUDIES:    Lab Results   Component Value Date    WBC 5.83 05/14/2021    HGB 15.7 05/14/2021    HCT 46.0 05/14/2021    MCV 85 05/14/2021     07/19/2022     BMP  Lab Results   Component Value Date     02/04/2023    K 4.3 02/04/2023     02/04/2023    CO2 22 (L) 02/04/2023    BUN 15 02/04/2023    CREATININE 0.8 02/04/2023    CALCIUM 9.7 02/04/2023    ANIONGAP 12 02/04/2023    ESTGFRAFRICA >60.0 04/20/2022    EGFRNONAA >60.0 04/20/2022     Lab Results   Component Value Date    ALT 37 07/19/2022    AST 19 07/19/2022    ALKPHOS 81 07/19/2022    BILITOT 0.7 07/19/2022     Lab Results   Component Value Date    TSH 1.294 05/14/2021     Lab Results   Component Value Date    HGBA1C 9.5 (H) 02/04/2023       IMPRESSION:    Roberto Carlos Souza is a 27 y.o. male with history of MDD and MARCOS who presents for follow up appointment.    Status/Progress: Based on the examination today, the patient's problem(s) is/are  inadequately controlled.  New problems have not been presented today.   Co-morbidities are not complicating management of the primary condition.  There are no active rule-out diagnoses for this patient at this time.     12/15/2022 Patient's last visit with me was approximately 1 year ago on 12/22/2021.  Patient was seen by PCP approximately 5 months ago for increased anxiety and Zoloft was increased and buspirone was initiated at that time.  Patient reports mood and anxiety are well controlled but notes continued difficulty with attention and concentration and requests evaluation for ADHD.  Referral placed to Psychology for evaluation of attention and concentration deficit.  Current pharmacological intervention will be continued at this time.      Risk Parameters:  Patient reports no suicidal ideation  Patient reports no homicidal ideation  Patient reports no self-injurious behavior  Patient reports no violent behavior    DIAGNOSES:    ICD-10-CM ICD-9-CM   1. Anxiety with depression  F41.8 300.4       PLAN:  Increase Zoloft to 150 mg daily for mood and residual anxiety  Continue Wellbutrin  mg q.a.m. for mood and attention and concentration deficit  Continue buspirone 5-7.5 mg p.o. t.i.d. p.r.n. anxiety  Referral previously placed to Psychology for evaluation of attention concentration deficit, patient states he will schedule  Offered referral for individual psychotherapy.  Pt given information on 3 months of free online therapy per the [facility] in partnership with NAYLA Jackson        RETURN TO CLINIC:   6 weeks      ESTELLA Becerril, PMHNP-BC        30 minutes spent on this encounter, >50% time spent in counseling.     At this time there are no indications the patient represents an imminent danger to either themselves or others; will continue to manage treatment in the outpatient setting.    I discussed the patient's care with the patient including benefits, alternatives,  "possible adverse effects of the treatment plan; including the potential for metabolic complications, major organ dysfunction, black box warnings, and contraindications. The opportunity was given for questions/clarification, and after this discussion the above treatment plan was devised through shared decision making. The patient voiced their understanding of the diagnoses and treatments listed above and agreed to the treatment plan. Follow up plan was reviewed with the patient. The patient was advised to call to report any worsening of symptoms or problems with medication.    Supportive therapy and psychoeducation provided. I discussed the importance of regular exercise, maintenance of a healthy weight, balanced diet rich in fruits/vegetables and lean protein, and avoidance of unhealthy habits like smoking and excessive alcohol intake. Educated patient about activating patient portal to receive education material. Patient agreed and understands that I will be providing reading material to help understand treatment.     Patient has been given crisis information including Suicide and Crisis Lifeline (call or text: 175), Crisis Text Hotline (text: HOME to 118917). Patient also given instructions to go to the nearest ER or call 911 if unable to remain safe or if the Pt develops thoughts of harming self or others.    [facility]: Reviewed today to detect potential controlled substance misuse, diversion, excessive prescribing, or multiple providers prescribing controlled substances. The patients report was deemed appropriate without new medications of concerns prescribed by other providers.    Documentation entered by me for this encounter may have been done in part using [employer] Direct voice recognition transcription software. Garbled syntax, mangled pronouns, and other bizarre constructions may be attributed to that software system. Although I have made an effort to ensure accuracy, "sound like" errors " may exist and should be interpreted in context.

## 2023-03-15 ENCOUNTER — OFFICE VISIT (OUTPATIENT)
Dept: PSYCHIATRY | Facility: CLINIC | Age: 28
End: 2023-03-15
Payer: COMMERCIAL

## 2023-03-15 ENCOUNTER — PATIENT MESSAGE (OUTPATIENT)
Dept: PSYCHIATRY | Facility: CLINIC | Age: 28
End: 2023-03-15
Payer: COMMERCIAL

## 2023-03-15 DIAGNOSIS — F41.8 ANXIETY WITH DEPRESSION: ICD-10-CM

## 2023-03-15 PROCEDURE — 3046F HEMOGLOBIN A1C LEVEL >9.0%: CPT | Mod: CPTII,95,,

## 2023-03-15 PROCEDURE — 1160F RVW MEDS BY RX/DR IN RCRD: CPT | Mod: CPTII,95,,

## 2023-03-15 PROCEDURE — 99214 OFFICE O/P EST MOD 30 MIN: CPT | Mod: 95,,,

## 2023-03-15 PROCEDURE — 3066F NEPHROPATHY DOC TX: CPT | Mod: CPTII,95,,

## 2023-03-15 PROCEDURE — 3060F POS MICROALBUMINURIA REV: CPT | Mod: CPTII,95,,

## 2023-03-15 PROCEDURE — 99214 PR OFFICE/OUTPT VISIT, EST, LEVL IV, 30-39 MIN: ICD-10-PCS | Mod: 95,,,

## 2023-03-15 PROCEDURE — 3060F PR POS MICROALBUMINURIA RESULT DOCUMENTED/REVIEW: ICD-10-PCS | Mod: CPTII,95,,

## 2023-03-15 PROCEDURE — 1159F MED LIST DOCD IN RCRD: CPT | Mod: CPTII,95,,

## 2023-03-15 PROCEDURE — 1160F PR REVIEW ALL MEDS BY PRESCRIBER/CLIN PHARMACIST DOCUMENTED: ICD-10-PCS | Mod: CPTII,95,,

## 2023-03-15 PROCEDURE — 1159F PR MEDICATION LIST DOCUMENTED IN MEDICAL RECORD: ICD-10-PCS | Mod: CPTII,95,,

## 2023-03-15 PROCEDURE — 3066F PR DOCUMENTATION OF TREATMENT FOR NEPHROPATHY: ICD-10-PCS | Mod: CPTII,95,,

## 2023-03-15 PROCEDURE — 3046F PR MOST RECENT HEMOGLOBIN A1C LEVEL > 9.0%: ICD-10-PCS | Mod: CPTII,95,,

## 2023-03-15 PROCEDURE — 3072F PR LOW RISK FOR RETINOPATHY: ICD-10-PCS | Mod: CPTII,95,,

## 2023-03-15 PROCEDURE — 3072F LOW RISK FOR RETINOPATHY: CPT | Mod: CPTII,95,,

## 2023-03-15 RX ORDER — SERTRALINE HYDROCHLORIDE 100 MG/1
150 TABLET, FILM COATED ORAL DAILY
Qty: 45 TABLET | Refills: 2 | Status: SHIPPED | OUTPATIENT
Start: 2023-03-15 | End: 2023-07-27 | Stop reason: SDUPTHER

## 2023-04-06 ENCOUNTER — PATIENT MESSAGE (OUTPATIENT)
Dept: FAMILY MEDICINE | Facility: CLINIC | Age: 28
End: 2023-04-06
Payer: COMMERCIAL

## 2023-04-06 DIAGNOSIS — R80.9 MICROALBUMINURIA: ICD-10-CM

## 2023-04-06 DIAGNOSIS — E11.65 TYPE 2 DIABETES MELLITUS WITH HYPERGLYCEMIA, WITHOUT LONG-TERM CURRENT USE OF INSULIN: Primary | ICD-10-CM

## 2023-04-06 NOTE — TELEPHONE ENCOUNTER
Please call patient and see if he can be seen sooner than his follow-up appointment scheduled for 05/12/2023; sometime in the next few weeks as his fasting blood sugars 233; and his hemoglobin A1c is 9.5 down from 10.5.  Please advise him to adhere to low carb diet for roughly around 1800 calorie ADA.  Ask him to also exercise regularly as this will help him with his blood sugar control.  And ask him to obtain a fasting BMP and hemoglobin A1c before his next follow-up for reassessment; patient also needs to come by the office and  a 24 hour urine container for protein collection due to his microalbuminuria for further evaluation as well

## 2023-04-18 ENCOUNTER — PATIENT MESSAGE (OUTPATIENT)
Dept: FAMILY MEDICINE | Facility: CLINIC | Age: 28
End: 2023-04-18
Payer: COMMERCIAL

## 2023-04-19 RX ORDER — DULAGLUTIDE 0.75 MG/.5ML
0.75 INJECTION, SOLUTION SUBCUTANEOUS WEEKLY
COMMUNITY
Start: 2023-03-30 | End: 2023-04-19 | Stop reason: SDUPTHER

## 2023-04-19 NOTE — TELEPHONE ENCOUNTER
Care Due:                  Date            Visit Type   Department     Provider  --------------------------------------------------------------------------------                                ESTABLISHED                              PATIENT -    [facility]  Last Visit: 02-      VIRTUAL      MEDICINE       Kylah Zamudio                              EP -                              PRIMARY      [facility]  Next Visit: 05-      CARE ([facility])   MEDICINE       Manan Arthur                                                            Last  Test          Frequency    Reason                     Performed    Due Date  --------------------------------------------------------------------------------    CMP.........  12 months..  atorvastatin.............  04- 07-    [facility] Embedded Care Gaps. Reference number: [id number]. 4/19/2023   10:50:22 AM CDT

## 2023-04-20 RX ORDER — DULAGLUTIDE 0.75 MG/.5ML
0.75 INJECTION, SOLUTION SUBCUTANEOUS WEEKLY
Qty: 4 PEN | Refills: 0 | Status: SHIPPED | OUTPATIENT
Start: 2023-04-20 | End: 2023-07-25

## 2023-04-20 NOTE — TELEPHONE ENCOUNTER
Approved one time only. Needs appt.  Past due would not be able to do further refills on Trulicity without an appointment for evaluation and discussion and will need labs done prior

## 2023-04-24 ENCOUNTER — PATIENT OUTREACH (OUTPATIENT)
Dept: ADMINISTRATIVE | Facility: HOSPITAL | Age: 28
End: 2023-04-24
Payer: COMMERCIAL

## 2023-04-24 NOTE — PROGRESS NOTES
UNCONTROLLED A1C REPORT.  PATIENT HAS AN UPCOMING LAB APPOINTMENT.  CHART REVIEWED;  LABS ORDERED AND LINKED WHAT IS NEEDED    Hemoglobin A1C   Date Value Ref Range Status   02/04/2023 9.5 (H) 4.0 - 5.6 % Final     Comment:     ADA Screening Guidelines:  5.7-6.4%  Consistent with prediabetes  >or=6.5%  Consistent with diabetes    High levels of fetal hemoglobin interfere with the HbA1C  assay. Heterozygous hemoglobin variants (HbS, HgC, etc)do  not significantly interfere with this assay.   However, presence of multiple variants may affect accuracy.     07/05/2022 10.5 (H) 4.0 - 5.6 % Final     Comment:     ADA Screening Guidelines:  5.7-6.4%  Consistent with prediabetes  >or=6.5%  Consistent with diabetes    High levels of fetal hemoglobin interfere with the HbA1C  assay. Heterozygous hemoglobin variants (HbS, HgC, etc)do  not significantly interfere with this assay.   However, presence of multiple variants may affect accuracy.     04/09/2022 9.2 (H) 4.0 - 5.6 % Final     Comment:     ADA Screening Guidelines:  5.7-6.4%  Consistent with prediabetes  >or=6.5%  Consistent with diabetes    High levels of fetal hemoglobin interfere with the HbA1C  assay. Heterozygous hemoglobin variants (HbS, HgC, etc)do  not significantly interfere with this assay.   However, presence of multiple variants may affect accuracy.

## 2023-05-23 NOTE — TELEPHONE ENCOUNTER
Please call patient and ask him to schedule a follow-up appointment for reassessment sometime in the next several weeks; he is past due; also ask him if he can obtain labs after an overnight fast the next morning about 1 week before his appointment.  With these are to include labs ordered by me on 03/13/2023 and 04/06/2023.  Hope this note finds and doing well

## 2023-05-25 NOTE — TELEPHONE ENCOUNTER
Discussed case with patient by phone and the importance of his being compliant with regards to his labs and his follow-up in the office.  Last office visit with me was 07/08/2022.  With last labs 02/04/2023 fasting blood sugar was 233 and hemoglobin A1c 9.5; previously prior to that on 07/05 it was 10.5.  Patient willing to schedule appointment for his labs and for follow-up visit.  Please schedule his labs which were should include labs ordered by me on 03/13 and 4/6 please do not double book duplicate labs.  He will also need to be seen by me sometime within the next few weeks.  Please book this appointment as well to go over the results.       Advised to increase glimepiride to 3 mg in the morning with blood sugars averaging 170-190 in the morning.  Patient is still on Trulicity and is nausea and vomiting had cleared suspicious for having had food poisoning.  Stressed the importance of obtaining labs as directed as well as keeping his follow ups.

## 2023-06-06 ENCOUNTER — LAB VISIT (OUTPATIENT)
Dept: LAB | Facility: HOSPITAL | Age: 28
End: 2023-06-06
Attending: INTERNAL MEDICINE
Payer: COMMERCIAL

## 2023-06-06 DIAGNOSIS — E78.5 DYSLIPIDEMIA: Chronic | ICD-10-CM

## 2023-06-06 DIAGNOSIS — R80.9 MICROALBUMINURIA: ICD-10-CM

## 2023-06-06 DIAGNOSIS — E11.65 TYPE 2 DIABETES MELLITUS WITH HYPERGLYCEMIA, WITHOUT LONG-TERM CURRENT USE OF INSULIN: ICD-10-CM

## 2023-06-06 DIAGNOSIS — E11.9 DIABETES MELLITUS WITHOUT COMPLICATION: ICD-10-CM

## 2023-06-06 LAB
ALBUMIN SERPL BCP-MCNC: 3.9 G/DL (ref 3.5–5.2)
ALP SERPL-CCNC: 72 U/L (ref 55–135)
ALT SERPL W/O P-5'-P-CCNC: 53 U/L (ref 10–44)
ANION GAP SERPL CALC-SCNC: 9 MMOL/L (ref 8–16)
ANION GAP SERPL CALC-SCNC: 9 MMOL/L (ref 8–16)
AST SERPL-CCNC: 27 U/L (ref 10–40)
BILIRUB SERPL-MCNC: 0.4 MG/DL (ref 0.1–1)
BUN SERPL-MCNC: 13 MG/DL (ref 6–20)
BUN SERPL-MCNC: 13 MG/DL (ref 6–20)
CALCIUM SERPL-MCNC: 9.9 MG/DL (ref 8.7–10.5)
CALCIUM SERPL-MCNC: 9.9 MG/DL (ref 8.7–10.5)
CHLORIDE SERPL-SCNC: 104 MMOL/L (ref 95–110)
CHLORIDE SERPL-SCNC: 104 MMOL/L (ref 95–110)
CHOLEST SERPL-MCNC: 229 MG/DL (ref 120–199)
CHOLEST/HDLC SERPL: 7.2 {RATIO} (ref 2–5)
CO2 SERPL-SCNC: 25 MMOL/L (ref 23–29)
CO2 SERPL-SCNC: 25 MMOL/L (ref 23–29)
CREAT SERPL-MCNC: 0.7 MG/DL (ref 0.5–1.4)
CREAT SERPL-MCNC: 0.7 MG/DL (ref 0.5–1.4)
EST. GFR  (NO RACE VARIABLE): >60 ML/MIN/1.73 M^2
EST. GFR  (NO RACE VARIABLE): >60 ML/MIN/1.73 M^2
ESTIMATED AVG GLUCOSE: 214 MG/DL (ref 68–131)
ESTIMATED AVG GLUCOSE: 214 MG/DL (ref 68–131)
GLUCOSE SERPL-MCNC: 251 MG/DL (ref 70–110)
GLUCOSE SERPL-MCNC: 251 MG/DL (ref 70–110)
HBA1C MFR BLD: 9.1 % (ref 4–5.6)
HBA1C MFR BLD: 9.1 % (ref 4–5.6)
HDLC SERPL-MCNC: 32 MG/DL (ref 40–75)
HDLC SERPL: 14 % (ref 20–50)
LDLC SERPL CALC-MCNC: 133.2 MG/DL (ref 63–159)
NONHDLC SERPL-MCNC: 197 MG/DL
POTASSIUM SERPL-SCNC: 4.3 MMOL/L (ref 3.5–5.1)
POTASSIUM SERPL-SCNC: 4.3 MMOL/L (ref 3.5–5.1)
PROT SERPL-MCNC: 7.1 G/DL (ref 6–8.4)
SODIUM SERPL-SCNC: 138 MMOL/L (ref 136–145)
SODIUM SERPL-SCNC: 138 MMOL/L (ref 136–145)
TRIGL SERPL-MCNC: 319 MG/DL (ref 30–150)

## 2023-06-06 PROCEDURE — 83036 HEMOGLOBIN GLYCOSYLATED A1C: CPT | Performed by: INTERNAL MEDICINE

## 2023-06-06 PROCEDURE — 80061 LIPID PANEL: CPT | Performed by: INTERNAL MEDICINE

## 2023-06-06 PROCEDURE — 80053 COMPREHEN METABOLIC PANEL: CPT | Performed by: INTERNAL MEDICINE

## 2023-06-06 PROCEDURE — 84156 ASSAY OF PROTEIN URINE: CPT | Performed by: INTERNAL MEDICINE

## 2023-06-06 PROCEDURE — 36415 COLL VENOUS BLD VENIPUNCTURE: CPT | Mod: PN | Performed by: INTERNAL MEDICINE

## 2023-06-07 LAB
PROT 24H UR-MRATE: NORMAL MG/SPEC (ref 0–100)
PROT UR-MCNC: <7 MG/DL (ref 0–15)
URINE COLLECTION DURATION: NORMAL HR
URINE VOLUME: NORMAL ML

## 2023-06-27 ENCOUNTER — TELEPHONE (OUTPATIENT)
Dept: OPTOMETRY | Facility: CLINIC | Age: 28
End: 2023-06-27
Payer: COMMERCIAL

## 2023-06-27 NOTE — TELEPHONE ENCOUNTER
Spoke with pt. Gave him appt for 9:00 am annie with Dr. Reyes.  Try AT's not visine for some reilef.          ----- Message from Silvia Land sent at 6/27/2023  1:40 PM CDT -----  Contact: pt  Type:  Same Day Appointment Request    Caller is requesting a same day appointment.  Caller declined first available appointment listed below.    Name of Caller:pt  When is the first available appointment?6/28  Symptoms:Feels like something in right eye. Itchy, red, little painful for 2 days  Best Call Back Number:[phone]  Additional Information: Please advise. thanks         no

## 2023-06-28 ENCOUNTER — PATIENT MESSAGE (OUTPATIENT)
Dept: PSYCHIATRY | Facility: CLINIC | Age: 28
End: 2023-06-28
Payer: COMMERCIAL

## 2023-06-28 ENCOUNTER — OFFICE VISIT (OUTPATIENT)
Dept: OPTOMETRY | Facility: CLINIC | Age: 28
End: 2023-06-28
Payer: COMMERCIAL

## 2023-06-28 DIAGNOSIS — H10.531 CONTACT BLEPHAROCONJUNCTIVITIS OF RIGHT EYE: Primary | ICD-10-CM

## 2023-06-28 PROCEDURE — 1159F PR MEDICATION LIST DOCUMENTED IN MEDICAL RECORD: ICD-10-PCS | Mod: CPTII,S$GLB,, | Performed by: OPTOMETRIST

## 2023-06-28 PROCEDURE — 3066F NEPHROPATHY DOC TX: CPT | Mod: CPTII,S$GLB,, | Performed by: OPTOMETRIST

## 2023-06-28 PROCEDURE — 1160F RVW MEDS BY RX/DR IN RCRD: CPT | Mod: CPTII,S$GLB,, | Performed by: OPTOMETRIST

## 2023-06-28 PROCEDURE — 3046F HEMOGLOBIN A1C LEVEL >9.0%: CPT | Mod: CPTII,S$GLB,, | Performed by: OPTOMETRIST

## 2023-06-28 PROCEDURE — 99213 PR OFFICE/OUTPT VISIT, EST, LEVL III, 20-29 MIN: ICD-10-PCS | Mod: S$GLB,,, | Performed by: OPTOMETRIST

## 2023-06-28 PROCEDURE — 1160F PR REVIEW ALL MEDS BY PRESCRIBER/CLIN PHARMACIST DOCUMENTED: ICD-10-PCS | Mod: CPTII,S$GLB,, | Performed by: OPTOMETRIST

## 2023-06-28 PROCEDURE — 3060F POS MICROALBUMINURIA REV: CPT | Mod: CPTII,S$GLB,, | Performed by: OPTOMETRIST

## 2023-06-28 PROCEDURE — 99999 PR PBB SHADOW E&M-EST. PATIENT-LVL III: CPT | Mod: PBBFAC,,, | Performed by: OPTOMETRIST

## 2023-06-28 PROCEDURE — 99213 OFFICE O/P EST LOW 20 MIN: CPT | Mod: S$GLB,,, | Performed by: OPTOMETRIST

## 2023-06-28 PROCEDURE — 1159F MED LIST DOCD IN RCRD: CPT | Mod: CPTII,S$GLB,, | Performed by: OPTOMETRIST

## 2023-06-28 PROCEDURE — 99999 PR PBB SHADOW E&M-EST. PATIENT-LVL III: ICD-10-PCS | Mod: PBBFAC,,, | Performed by: OPTOMETRIST

## 2023-06-28 PROCEDURE — 3046F PR MOST RECENT HEMOGLOBIN A1C LEVEL > 9.0%: ICD-10-PCS | Mod: CPTII,S$GLB,, | Performed by: OPTOMETRIST

## 2023-06-28 PROCEDURE — 3060F PR POS MICROALBUMINURIA RESULT DOCUMENTED/REVIEW: ICD-10-PCS | Mod: CPTII,S$GLB,, | Performed by: OPTOMETRIST

## 2023-06-28 PROCEDURE — 3066F PR DOCUMENTATION OF TREATMENT FOR NEPHROPATHY: ICD-10-PCS | Mod: CPTII,S$GLB,, | Performed by: OPTOMETRIST

## 2023-06-28 RX ORDER — TOBRAMYCIN AND DEXAMETHASONE 3; 1 MG/ML; MG/ML
1 SUSPENSION/ DROPS OPHTHALMIC 4 TIMES DAILY
Qty: 2.5 ML | Refills: 0 | Status: SHIPPED | OUTPATIENT
Start: 2023-06-28 | End: 2023-07-25

## 2023-06-28 NOTE — PROGRESS NOTES
HPI    Pt presents for irritated OD x 2 days   Trace mucous oD, no contact lens wear  States noticed about 2 days ago OD became red and irritated, very itchy   and slight pain. On a scale of 1-10 pain is a 3.     Anti itch/redness relief PRN (has since d/c)   ATS PRN OU   Last edited by Elvis Shaw, OD on 6/28/2023 10:15 AM.            Assessment /Plan     For exam results, see Encounter Report.    Contact blepharoconjunctivitis of right eye  -     tobramycin-dexAMETHasone 0.3-0.1% (TOBRADEX) 0.3-0.1 % DrpS; Place 1 drop into the right eye 4 (four) times daily. for 7 days  Dispense: 2.5 mL; Refill: 0      Start Tobradex drops 4x/day x 1 week, RTC or call if no better in 1 week.

## 2023-07-05 NOTE — PROGRESS NOTES
Diabetes Education  Author: Cassandra Plummer RD, CDE  Date: 5/17/2017    Diabetes Education Visit  Diabetes Education Record Assessment/Progress: Post Program/Follow-up    Diabetes Type  Diabetes Type : Type II    Diabetes History  Diabetes Diagnosis: 0-1 year    Nutrition  Meal Planning: snacks between meal, water, diet drinks, eats out often (Eats 2-3 meals a day - usually skips bfast; if skips bfast, has AM snack)    Monitoring   Monitoring: Other (Was given a Contour meter at his last visit and Rx for testing supplies routed to Dr. Bush, but were never signed; has been using parents' testing supplies - uses same meter; Rx request routed to Dr. Dee today)  Self Monitoring :  (Checks BG once a day)  Blood Glucose Logs: No    Exercise   Exercise Type: walking (Some walking)    Current Diabetes Treatment   Current Treatment: Oral Medication (Metformin - denies missing doses )                                                 Diabetes Education Assessment/Progress  Acute Complications (preventing, detecting, and treating acute complications): Competent (verbalizes/demonstrates)  Chronic Complications (preventing, detecting, and treating chronic complications): Competent (verbalizes/demonstrates)  Diabetes Disease Process (diabetes disease process and treatment options): Competent (verbalizes/demonstrates)  Nutrition (Incorporating nutritional management into one's lifestyle): Competent (verbalizes/demonstrates)  Physical Activity (incorporating physical activity into one's lifestyle): Competent (verbalizes/demonstrates)  Medications (states correct name, dose, onset, peak, duration, side effects & timing of meds): Competent (verbalizes/demonstrates)  Monitoring (monitoring blood glucose/other parameters & using results): Competent (verbalizes/demonstrates)  Goal Setting and Problem Solving (verbalizes behavior change strategies & sets realistic goals): Competent (verbalizes/demonstrates)  Behavior Change (developing  personal strategies to health & behavior change): Comnpetent (verbalizes/demonstrates)  Psychosocial Issues (developing personal srategies to address psychosocial concerns): Competent (verbalizes/demonstrates)    Goals  Monitoring: In Progress (Patient was not able to check 2 times a day as initially recommended because testing supplies were not sent to pharmacy - was only able to check once a day with parents' supplies; okay to check once a day now)    Diabetes Self-Management Support Plan  Diabetes Learning: other (Annual DE)    Diabetes Care Plan/Intervention  Education Plan/Intervention:  (Patient has ENDO appt next week; advised that he needs to establish himself with a PCP - currently does not have one)    Diabetes Meal Plan  Carbohydrate Per Meal: 45-60g  Carbohydrate Per Snack : 7-15g    Education Units of Time   Time Spent: 30 min      Health Maintenance Topics with due status: Not Due       Topic Last Completion Date    TETANUS VACCINE 11/29/2016    Influenza Vaccine 01/11/2017     Health Maintenance Due   Topic Date Due    Pneumococcal PPSV23 (Medium Risk) (1) 07/15/2011      Statement Selected

## 2023-07-12 NOTE — PROGRESS NOTES
Outpatient Psychiatry Follow-Up Visit (MD/NP)    12/21/2017    Clinical Status of Patient:  Outpatient (Ambulatory)    Chief Complaint:  Roberto Carlos Souza is a 22 y.o. male who presents today for follow-up of depression and anxiety.  Met with patient.  Pt new to me. Last visit was with Aura Becerra NP on 9/14/17. Chart reveiwed.    Interval History and Content of Current Session:  Psychiatric Medication Profile  · Continue Wellbutrin  mg daily  · Continue Prozac 10 mg daily      Affect bright and congruent with mood .  Reports efficacy with current medications.  Denies side effects or adverse reactions.  No changes to dose in 2 years.  Will follow/up in 6 months.     Psychotherapy:  · Target symptoms: depression, anxiety   · Why chosen therapy is appropriate versus another modality: relevant to diagnosis, evidence based practice  · Outcome monitoring methods: self-report, observation  · Therapeutic intervention type: supportive psychotherapy  · Topics discussed/themes: work stress  · The patient's response to the intervention is accepting. The patient's progress toward treatment goals is good.   · Duration of intervention: 15 minutes.    Review of Systems   GENERAL: No weight gain or loss   SKIN: No rashes or lacerations   HEAD: No headaches   EYES: No exophthalmos, jaundice or blindness   EARS: No dizziness, tinnitus or hearing loss   NOSE: No changes in smell   MOUTH & THROAT: No dyskinetic movements or obvious goiter   CHEST: No shortness of breath, hyperventilation or cough   CARDIOVASCULAR: No tachycardia or chest pain   ABDOMEN: No nausea, vomiting, pain, constipation or diarrhea   URINARY: No frequency, dysuria or sexual dysfunction   ENDOCRINE: No polydipsia, polyuria   MUSCULOSKELETAL: No pain or stiffness of the joints   NEUROLOGIC: No weakness, sensory changes, seizures, confusion, memory loss, tremor or other abnormal movements      Psychiatric Review Of Systems:  sleep: no  appetite changes:  Use [employer] [facility]    Go to [facility]    Check your sugars every morning. They should stay <140. If they are higher despite the medication, please call us to let us know         Advance Directives: Care Instructions  Overview  An advance directive is a legal way to state your wishes at the end of your life. It tells your family and your doctor what to do if you can't say what you want. There are two main types of advance directives. You can change them any time your wishes change. Living will. This form tells your family and your doctor your wishes about life support and other treatment. The form is also called a declaration. Medical power of . This form lets you name a person to make treatment decisions for you when you can't speak for yourself. This person is called a health care agent (health care proxy, health care surrogate). The form is also called a durable power of  for health care. If you do not have an advance directive, decisions about your medical care may be made by a family member, or by a doctor or a  who doesn't know you. It may help to think of an advance directive as a gift to the people who care for you. If you have one, they won't have to make tough decisions by themselves. For more information, including forms for your state, see the [address] [place] [address] website (www.caringinfo.org/planning/advance-directives/). Follow-up care is a key part of your treatment and safety. Be sure to make and go to all appointments, and call your doctor if you are having problems. It's also a good idea to know your test results and keep a list of the medicines you take. What should you include in an advance directive? Many states have a unique advance directive form. (It may ask you to address specific issues.) Or you might use a universal form that's approved by many states.   If your form doesn't tell you what to address, it may be hard to know what to include in your advance "no  weight changes: yes, weight loss  energy/anergy: no  interest/pleasure/anhedonia: no  somatic symptoms: no  libido: not assessed  anxiety/panic: no      Past Medical, Family and Social History: The patient's past medical, family and social history have been reviewed and updated as appropriate within the electronic medical record - see encounter notes.    Compliance: yes    Side effects: None    Risk Parameters:  Patient reports no suicidal ideation  Patient reports no homicidal ideation  Patient reports no self-injurious behavior  Patient reports no violent behavior    Exam (detailed: at least 9 elements; comprehensive: all 15 elements)   Constitutional  Vitals:  Most recent vital signs, dated less than 90 days prior to this appointment, were not reviewed.   There were no vitals filed for this visit.  124.1 kg (273 lb 9.6 oz), 6' 1" (1.854 m)   General:  unremarkable, age appropriate, well nourished, bearded, casually dressed     Musculoskeletal  Muscle Strength/Tone:  not examined   Gait & Station:  non-ataxic     Psychiatric  Speech:  no latency; no press   Mood & Affect:  happy  congruent and appropriate   Thought Process:  normal and logical   Associations:  intact   Thought Content:  normal, no suicidality, no homicidality, delusions, or paranoia   Insight:  intact   Judgement: behavior is adequate to circumstances   Orientation:  grossly intact   Memory: intact for content of interview   Language: grossly intact   Attention Span & Concentration:  able to focus   Fund of Knowledge:  intact and appropriate to age and level of education     Assessment and Diagnosis   Status/Progress: Based on the examination today, the patient's problem(s) is/are well controlled.  New problems have not been presented today.   Co-morbidities, Diagnostic uncertainty and Lack of compliance are not complicating management of the primary condition.  There are no active rule-out diagnoses for this patient at this time.     General " Impression:   Major Depressive Disorder, Recurrent, Moderate, In Remission  Generalized Anxiety Disorder      Intervention/Counseling/Treatment Plan   · Medication Management: Continue current medications. The risks and benefits of medication were discussed with the patient.   · Continue Wellbutrin  mg daily  · Continue Prozac 10 mg daily      Return to Clinic: 6 months   Yes

## 2023-07-19 ENCOUNTER — PATIENT MESSAGE (OUTPATIENT)
Dept: PSYCHIATRY | Facility: CLINIC | Age: 28
End: 2023-07-19
Payer: COMMERCIAL

## 2023-07-25 ENCOUNTER — OFFICE VISIT (OUTPATIENT)
Dept: FAMILY MEDICINE | Facility: CLINIC | Age: 28
End: 2023-07-25
Payer: COMMERCIAL

## 2023-07-25 ENCOUNTER — TELEPHONE (OUTPATIENT)
Dept: ENDOCRINOLOGY | Facility: CLINIC | Age: 28
End: 2023-07-25
Payer: COMMERCIAL

## 2023-07-25 VITALS
HEART RATE: 71 BPM | SYSTOLIC BLOOD PRESSURE: 136 MMHG | RESPIRATION RATE: 16 BRPM | BODY MASS INDEX: 33.72 KG/M2 | WEIGHT: 254.44 LBS | DIASTOLIC BLOOD PRESSURE: 88 MMHG | HEIGHT: 73 IN | OXYGEN SATURATION: 96 % | TEMPERATURE: 99 F

## 2023-07-25 DIAGNOSIS — T88.7XXA SIDE EFFECT OF MEDICATION: ICD-10-CM

## 2023-07-25 DIAGNOSIS — E66.9 CLASS 1 OBESITY WITH BODY MASS INDEX (BMI) OF 33.0 TO 33.9 IN ADULT, UNSPECIFIED OBESITY TYPE, UNSPECIFIED WHETHER SERIOUS COMORBIDITY PRESENT: ICD-10-CM

## 2023-07-25 DIAGNOSIS — F33.1 MODERATE EPISODE OF RECURRENT MAJOR DEPRESSIVE DISORDER: Chronic | ICD-10-CM

## 2023-07-25 DIAGNOSIS — R74.01 TRANSAMINITIS: ICD-10-CM

## 2023-07-25 DIAGNOSIS — E78.5 DYSLIPIDEMIA: Chronic | ICD-10-CM

## 2023-07-25 DIAGNOSIS — F41.1 GAD (GENERALIZED ANXIETY DISORDER): Chronic | ICD-10-CM

## 2023-07-25 DIAGNOSIS — E11.65 TYPE 2 DIABETES MELLITUS WITH HYPERGLYCEMIA, WITHOUT LONG-TERM CURRENT USE OF INSULIN: Primary | Chronic | ICD-10-CM

## 2023-07-25 DIAGNOSIS — Z01.89 ENCOUNTER FOR LABORATORY TEST: ICD-10-CM

## 2023-07-25 LAB — GLUCOSE SERPL-MCNC: 263 MG/DL (ref 70–110)

## 2023-07-25 PROCEDURE — 99999 PR PBB SHADOW E&M-EST. PATIENT-LVL V: ICD-10-PCS | Mod: PBBFAC,,, | Performed by: INTERNAL MEDICINE

## 2023-07-25 PROCEDURE — 3066F PR DOCUMENTATION OF TREATMENT FOR NEPHROPATHY: ICD-10-PCS | Mod: CPTII,S$GLB,, | Performed by: INTERNAL MEDICINE

## 2023-07-25 PROCEDURE — 3008F PR BODY MASS INDEX (BMI) DOCUMENTED: ICD-10-PCS | Mod: CPTII,S$GLB,, | Performed by: INTERNAL MEDICINE

## 2023-07-25 PROCEDURE — 3079F DIAST BP 80-89 MM HG: CPT | Mod: CPTII,S$GLB,, | Performed by: INTERNAL MEDICINE

## 2023-07-25 PROCEDURE — 3060F PR POS MICROALBUMINURIA RESULT DOCUMENTED/REVIEW: ICD-10-PCS | Mod: CPTII,S$GLB,, | Performed by: INTERNAL MEDICINE

## 2023-07-25 PROCEDURE — 3075F PR MOST RECENT SYSTOLIC BLOOD PRESS GE 130-139MM HG: ICD-10-PCS | Mod: CPTII,S$GLB,, | Performed by: INTERNAL MEDICINE

## 2023-07-25 PROCEDURE — 99215 OFFICE O/P EST HI 40 MIN: CPT | Mod: S$GLB,,, | Performed by: INTERNAL MEDICINE

## 2023-07-25 PROCEDURE — 3075F SYST BP GE 130 - 139MM HG: CPT | Mod: CPTII,S$GLB,, | Performed by: INTERNAL MEDICINE

## 2023-07-25 PROCEDURE — 3008F BODY MASS INDEX DOCD: CPT | Mod: CPTII,S$GLB,, | Performed by: INTERNAL MEDICINE

## 2023-07-25 PROCEDURE — 3046F HEMOGLOBIN A1C LEVEL >9.0%: CPT | Mod: CPTII,S$GLB,, | Performed by: INTERNAL MEDICINE

## 2023-07-25 PROCEDURE — 3060F POS MICROALBUMINURIA REV: CPT | Mod: CPTII,S$GLB,, | Performed by: INTERNAL MEDICINE

## 2023-07-25 PROCEDURE — 99999 PR PBB SHADOW E&M-EST. PATIENT-LVL V: CPT | Mod: PBBFAC,,, | Performed by: INTERNAL MEDICINE

## 2023-07-25 PROCEDURE — 1160F RVW MEDS BY RX/DR IN RCRD: CPT | Mod: CPTII,S$GLB,, | Performed by: INTERNAL MEDICINE

## 2023-07-25 PROCEDURE — 82962 POCT GLUCOSE, HAND-HELD DEVICE: ICD-10-PCS | Mod: S$GLB,,, | Performed by: INTERNAL MEDICINE

## 2023-07-25 PROCEDURE — 3079F PR MOST RECENT DIASTOLIC BLOOD PRESSURE 80-89 MM HG: ICD-10-PCS | Mod: CPTII,S$GLB,, | Performed by: INTERNAL MEDICINE

## 2023-07-25 PROCEDURE — 3066F NEPHROPATHY DOC TX: CPT | Mod: CPTII,S$GLB,, | Performed by: INTERNAL MEDICINE

## 2023-07-25 PROCEDURE — 82962 GLUCOSE BLOOD TEST: CPT | Mod: S$GLB,,, | Performed by: INTERNAL MEDICINE

## 2023-07-25 PROCEDURE — 3072F PR LOW RISK FOR RETINOPATHY: ICD-10-PCS | Mod: CPTII,S$GLB,, | Performed by: INTERNAL MEDICINE

## 2023-07-25 PROCEDURE — 3072F LOW RISK FOR RETINOPATHY: CPT | Mod: CPTII,S$GLB,, | Performed by: INTERNAL MEDICINE

## 2023-07-25 PROCEDURE — 1159F MED LIST DOCD IN RCRD: CPT | Mod: CPTII,S$GLB,, | Performed by: INTERNAL MEDICINE

## 2023-07-25 PROCEDURE — 1159F PR MEDICATION LIST DOCUMENTED IN MEDICAL RECORD: ICD-10-PCS | Mod: CPTII,S$GLB,, | Performed by: INTERNAL MEDICINE

## 2023-07-25 PROCEDURE — 3046F PR MOST RECENT HEMOGLOBIN A1C LEVEL > 9.0%: ICD-10-PCS | Mod: CPTII,S$GLB,, | Performed by: INTERNAL MEDICINE

## 2023-07-25 PROCEDURE — 1160F PR REVIEW ALL MEDS BY PRESCRIBER/CLIN PHARMACIST DOCUMENTED: ICD-10-PCS | Mod: CPTII,S$GLB,, | Performed by: INTERNAL MEDICINE

## 2023-07-25 PROCEDURE — 99215 PR OFFICE/OUTPT VISIT, EST, LEVL V, 40-54 MIN: ICD-10-PCS | Mod: S$GLB,,, | Performed by: INTERNAL MEDICINE

## 2023-07-25 RX ORDER — INSULIN DETEMIR 100 [IU]/ML
INJECTION, SOLUTION SUBCUTANEOUS
Qty: 6 ML | Refills: 1 | Status: SHIPPED | OUTPATIENT
Start: 2023-07-25 | End: 2023-07-27 | Stop reason: SDUPTHER

## 2023-07-25 RX ORDER — TIRZEPATIDE 2.5 MG/.5ML
2.5 INJECTION, SOLUTION SUBCUTANEOUS
Qty: 4 PEN | Refills: 2 | Status: SHIPPED | OUTPATIENT
Start: 2023-07-25 | End: 2023-07-27

## 2023-07-25 RX ORDER — BUSPIRONE HYDROCHLORIDE 7.5 MG/1
TABLET ORAL
Qty: 50 TABLET | Refills: 2 | Status: SHIPPED | OUTPATIENT
Start: 2023-07-25 | End: 2024-01-23

## 2023-07-25 NOTE — PATIENT INSTRUCTIONS
Type 2 diabetes mellitus with hyperglycemia, without long-term current use of insulin; needs to resume BS's checks at home and watch his carb intake. DM education consult and consult A Chante NP DM Endocrine for eval and tx. ; POCT Glu 263; hgA1C 9.1. stressed im portance of taking his meds regularly, dieting and exercisising w monitoring BS's at home as well; check CBG's 2-3x a day and keep a log.   -     POCT Glucose, Hand-Held Device; 263 in office.   -     Ambulatory referral/consult to Diabetes Education; Future; Expected date: 08/01/2023  -     tirzepatide (MOUNJARO) 2.5 mg/0.5 mL PnIj; Inject 2.5 mg into the skin every 7 days.  Dispense: 4 pen ; Refill: 2  -     Comprehensive Metabolic Panel; Future; Expected date: 07/25/2023  -     Lipid Panel; Future; Expected date: 07/25/2023  -     Hemoglobin A1C; Future; Expected date: 07/25/2023  -     Ambulatory referral/consult to Endocrinology; Future; Expected date: 08/01/2023  -     insulin detemir U-100, Levemir, (LEVEMIR FLEXPEN) 100 unit/mL (3 mL) InPn pen; Inject 10 units sq every morning.  Dispense: 6 mL; Refill: 1    MARCOS (generalized anxiety disorder): Limit caffeine intake with stress reduction and regular exercise as tolerated.   -     busPIRone (BUSPAR) 7.5 MG tablet; Take 7.5 mg po TID as needed for anxiety. Generic  Dispense: 50 tablet; Refill: 2    Moderate episode of recurrent major depressive disorder; on sertraline w recent increase to 150 mg a day 2mos ago dr CASSIUS Campoverde psych; needs to schedule apt to revisit her in next few weeks.   -     busPIRone (BUSPAR) 7.5 MG tablet; Take 7.5 mg po TID as needed for anxiety. Generic  Dispense: 50 tablet; Refill: 2    Class 1 obesity with body mass index (BMI) of 33.0 to 33.9 in adult,  regularly. .     Dyslipidemia; Maintain low fat high fiber diet, exercise regularly. Weight reduction where indicated. Increase aerobic activity  -     Lipid Panel; Future; Expected date: 07/25/2023    Transaminitis; keep  well hydrated at 6-8 glasses of fluid a day.   -     Comprehensive Metabolic Panel; Future; Expected date: 07/25/2023    Side effect of medication; has used aleve occasional which may have raised LFT some. Keep well hydrtaed and limit NSDAID use; rare alcohol use. None in 18 mos.   -     Comprehensive Metabolic Panel; Future; Expected date: 07/25/2023

## 2023-07-25 NOTE — PROGRESS NOTES
Subjective:       Patient ID: Roberto Carlos Souza is a 28 y.o. male.    Chief Complaint: Follow-up  HPI:  Patient here today for reassessment and encounter for lab work  Follow-up  Pertinent negatives include no abdominal pain, arthralgias, chest pain, congestion, coughing, headaches, myalgias, nausea, rash or vomiting.     Type 2 diabetes mellitus with hyperglycemia, without long-term current use of insulin; needs to resume BS's checks at home and watch his carb intake.  And including exercise regimen as part of his daily routine. DM education consult and consult A Chante NP DM Endocrine for eval and tx. ; POCT Glu 263; HgA1C 9.1 on 06/06; previously was 9.5.. stressed im portance of taking his meds regularly, dieting and exercisising w monitoring BS's at home as well; check CBG's 2-3x a day and keep a log.  Stressed that he has been busy wife to be induced Thursday; reportedly worries a lot.  Discussed the importance of following his Accu-Cheks at home as well as exercising regularly and following his diet as well.  Patient is on glimepiride 2 mg b.i.d. and Jardiance 25 mg daily.  Has had no weight reduction with Trulicity.  Trulicity has been used for 3 months.  Fasting blood sugar 251.  Stressed the importance of his paying attention to his diabetes; potential risk of complications if he continues to ignore it.  After discussion stated he will try to do better.  Agreeable to diabetic Education consult, as well as consult A Chante NP DM Endocrine for eval and tx. assistance.  Start Levemir insulin 10 units subQ every morning        -     POCT Glucose, Hand-Held Device; 263 in office.   -     Ambulatory referral/consult to Diabetes Education; Future; Expected date: 08/01/2023  -     tirzepatide (MOUNJARO) 2.5 mg/0.5 mL PnIj; Inject 2.5 mg into the skin every 7 days.  Dispense: 4 pen ; Refill: 2  -     Comprehensive Metabolic Panel; Future; Expected date: 07/25/2023  -     Lipid Panel; Future; Expected date:  07/25/2023  -     Hemoglobin A1C; Future; Expected date: 07/25/2023  -     Ambulatory referral/consult to Endocrinology; Future; Expected date: 08/01/2023  -     insulin detemir U-100, Levemir, (LEVEMIR FLEXPEN) 100 unit/mL (3 mL) InPn pen; Inject 10 units sq every morning.  Dispense: 6 mL; Refill: 1    MARCOS (generalized anxiety disorder): Limit caffeine intake with stress reduction and regular exercise as tolerated.  BuSpar increase to 7.5 mg t.i.d. as needed for anxiety;         -     busPIRone (BUSPAR) 7.5 MG tablet; Take 7.5 mg po TID as needed for anxiety. Generic  Dispense: 50 tablet; Refill: 2    Moderate episode of recurrent major depressive disorder; on sertraline w recent increase to 150 mg a day, 2mos ago Dr CASSIUS Campoverde psych; needs to schedule apt to revisit her in next few weeks.  Prescribed BuSpar 7.5 mg t.i.d. as needed for anxiety.  Also on Wellbutrin 300 mg XL daily generic.  -     busPIRone (BUSPAR) 7.5 MG tablet; Take 7.5 mg po TID as needed for anxiety. Generic  Dispense: 50 tablet; Refill: 2    Class 1 obesity with body mass index (BMI) of 33.0 to 33.9 in adult,  exercise regularly; adherence to his diet .     Dyslipidemia; Maintain low fat high fiber diet, exercise regularly. Weight reduction where indicated. Increase aerobic activity  -     Lipid Panel; Future; Expected date: 07/25/2023    Transaminitis; keep well hydrated at 6-8 glasses of fluid a day.  6/6 ALT 53.  Limit any alcohol intake as well as NSAID use.  -     Comprehensive Metabolic Panel; Future; Expected date: 07/25/2023    Side effect of medication; has used aleve occasional which may have raised LFT some. Keep well hydrtaed and limit NSDAID use; rare alcohol use. None in 18 mos.   -     Comprehensive Metabolic Panel; Future; Expected date: 07/25/2023    Encounter for lab test: Labs reviewed and discussed with patient at length in ordered for follow-up      Total time: 12:58 p.m. through 2:02 p.m..  Greater than 50% of the time spent  "in discussion counseling and review.  Extensive time spent on diabetes counseling and management.  Labs reviewed and ordered for follow-up medications reviewed and addressed.  Consultations placed to diabetes management and MARIJA Sharif NP, DM specialist with endocrine service.    Review of Systems   Constitutional:  Negative for appetite change and unexpected weight change.   HENT:  Negative for congestion, postnasal drip, rhinorrhea and sinus pressure.         Denies seasonal allergies, or perennial allergies   Eyes:  Negative for discharge and itching.   Respiratory:  Negative for cough, chest tightness, shortness of breath and wheezing.    Cardiovascular:  Negative for chest pain, palpitations and leg swelling.   Gastrointestinal:  Negative for abdominal pain, blood in stool, constipation, diarrhea, nausea and vomiting.   Endocrine: Negative for polydipsia, polyphagia and polyuria.   Genitourinary:  Negative for dysuria and hematuria.   Musculoskeletal:  Negative for arthralgias and myalgias.   Skin:  Negative for rash.   Allergic/Immunologic: Negative for environmental allergies and food allergies.   Neurological:  Negative for tremors, seizures and headaches.   Hematological:  Negative for adenopathy. Does not bruise/bleed easily.   Psychiatric/Behavioral:  Positive for dysphoric mood. The patient is nervous/anxious.         Meds helping.      Objective:        Vitals:    07/25/23 1215   BP: 136/88   Pulse: 71   Resp: 16   Temp: 98.8 °F (37.1 °C)   TempSrc: Oral   SpO2: 96%   Weight: 115.4 kg (254 lb 6.6 oz)   Height: 6' 1" (1.854 m)       BMI Readings from Last 3 Encounters:   07/25/23 33.57 kg/m²   12/15/22 33.89 kg/m²   07/19/22 32.90 kg/m²        Wt Readings from Last 3 Encounters:   07/25/23 1215 115.4 kg (254 lb 6.6 oz)   12/15/22 1001 116.5 kg (256 lb 13.4 oz)   07/19/22 1550 113.1 kg (249 lb 5.4 oz)        BP Readings from Last 3 Encounters:   07/25/23 136/88   12/15/22 136/87   07/19/22 129/83    "     There are no preventive care reminders to display for this patient.     Health Maintenance Due   Topic Date Due    Pneumococcal Vaccines (Age 0-64) (1 - PCV) Never done    HIV Screening  Never done    Foot Exam  2020    COVID-19 Vaccine (3 - Pfizer series) 2021    Eye Exam  2023         Past Medical History:   Diagnosis Date    Anxiety     Diabetes mellitus, type 2     Dyslipidemia     Hypertension     Previously on lisinopril    Obesity        Past Surgical History:   Procedure Laterality Date    LASIK Bilateral 2015    SPINE SURGERY      2 disc removed; herniated disc. Lower back. Left leg crush injury       Social History     Tobacco Use    Smoking status: Former     Current packs/day: 0.00     Types: Cigars     Quit date:      Years since quittin.6    Smokeless tobacco: Never   Substance Use Topics    Alcohol use: Yes     Comment: social; less then one a week    Drug use: No       Family History   Problem Relation Age of Onset    Diabetes Mother     Breast cancer Mother         breast cancer; dx at early 40's    Diabetes Father     Colon cancer Neg Hx     Glaucoma Neg Hx     Cirrhosis Neg Hx        Review of patient's allergies indicates:   Allergen Reactions    Mounjaro [tirzepatide] Other (See Comments)     Patient developed with using Mounjaro, indigestion, belching, abdominal pain, diarrhea, upset stomach and nausea.  Was advised to discontinue the medication; please see e-mail addressing the issue..  Above GI complaints were addressed.  Pain was rated about a 6; he was advised to go to the emergency room should it worsen    Metformin      Discontinued with hematochezia; symptoms markedly improved but still persistent; remain off metformin    Rybelsus [semaglutide] Other (See Comments)     abdominal pain, nausea       Current Outpatient Medications on File Prior to Visit   Medication Sig Dispense Refill    atorvastatin (LIPITOR) 10 MG tablet Take 1 tablet (10 mg total)  by mouth once daily. 90 tablet 1    blood sugar diagnostic (CONTOUR NEXT STRIPS) Strp 1 each by Misc.(Non-Drug; Combo Route) route once daily. 30 each 11    glimepiride (AMARYL) 2 MG tablet TAKE 1 TABLET BY MOUTH TWICE DAILY WITH BREAKFAST AND DINNER 180 tablet 1    lancets (MICROLET LANCET) Misc 1 each by Misc.(Non-Drug; Combo Route) route 3 (three) times daily. 100 each 11    ondansetron (ZOFRAN) 4 MG tablet Take 1 tablet (4 mg total) by mouth every 6 (six) hours as needed for Nausea (Or vomiting as needed). 15 tablet 1    pantoprazole (PROTONIX) 40 MG tablet Take 40 mg p.o. daily as needed for GI upset, reflux or abdominal pain 90 tablet 1     No current facility-administered medications on file prior to visit.       Physical Exam  Vitals reviewed.   Constitutional:       Appearance: He is well-developed.   HENT:      Head: Normocephalic and atraumatic.   Neck:      Thyroid: No thyromegaly.   Cardiovascular:      Rate and Rhythm: Normal rate and regular rhythm.      Heart sounds: Normal heart sounds. No murmur heard.     No gallop.   Pulmonary:      Effort: Pulmonary effort is normal. No respiratory distress.      Breath sounds: Normal breath sounds. No wheezing or rales.   Abdominal:      General: Bowel sounds are normal. There is no distension.      Palpations: Abdomen is soft.      Tenderness: There is no abdominal tenderness. There is no guarding or rebound.   Musculoskeletal:         General: Normal range of motion.      Cervical back: Normal range of motion and neck supple.      Right lower leg: No edema.      Left lower leg: No edema.   Lymphadenopathy:      Cervical: No cervical adenopathy.   Skin:     Findings: No rash.   Neurological:      Mental Status: He is alert and oriented to person, place, and time.      Comments: Moves all 4 extremities fine.   Psychiatric:         Behavior: Behavior normal.         Thought Content: Thought content normal.         Assessment:       1. Type 2 diabetes mellitus  with hyperglycemia, without long-term current use of insulin    2. MARCOS (generalized anxiety disorder)    3. Moderate episode of recurrent major depressive disorder    4. Class 1 obesity with body mass index (BMI) of 33.0 to 33.9 in adult, unspecified obesity type, unspecified whether serious comorbidity present    5. Dyslipidemia    6. Transaminitis    7. Side effect of medication    8. Encounter for laboratory test        Plan:       Type 2 diabetes mellitus with hyperglycemia, without long-term current use of insulin; needs to resume BS's checks at home and watch his carb intake.  And including exercise regimen as part of his daily routine. DM education consult and consult A Chante NP DM Endocrine for eval and tx. ; POCT Glu 263; HgA1C 9.1 on 06/06; previously was 9.5.. stressed im portance of taking his meds regularly, dieting and exercisising w monitoring BS's at home as well; check CBG's 2-3x a day and keep a log.  Stressed that he has been busy wife to be induced Thursday; reportedly worries a lot.  Discussed the importance of following his Accu-Cheks at home as well as exercising regularly and following his diet as well.  Patient is on glimepiride 2 mg b.i.d. and Jardiance 25 mg daily.  Has had no weight reduction with Trulicity.  Trulicity has been used for 3 months.  Fasting blood sugar 251.  Stressed the importance of his paying attention to his diabetes; potential risk of complications if he continues to ignore it.  After discussion stated he will try to do better.  Agreeable to diabetic Education consult, as well as consult A Chante [facility] DM Endocrine for eval and tx. assistance.  Start Levemir insulin 10 units subQ every morning. Stop trulicity.  Used for 3 months with no weight loss.        -     POCT Glucose, Hand-Held Device; 263 in office.   -     Ambulatory referral/consult to Diabetes Education; Future; Expected date: 08/01/2023  -     tirzepatide (MOUNJARO) 2.5 mg/0.5 mL PnIj; Inject 2.5 mg  into the skin every 7 days.  Dispense: 4 pen ; Refill: 2  -     Comprehensive Metabolic Panel; Future; Expected date: 07/25/2023  -     Lipid Panel; Future; Expected date: 07/25/2023  -     Hemoglobin A1C; Future; Expected date: 07/25/2023  -     Ambulatory referral/consult to Endocrinology; Future; Expected date: 08/01/2023  -     insulin detemir U-100, Levemir, (LEVEMIR FLEXPEN) 100 unit/mL (3 mL) InPn pen; Inject 10 units sq every morning.  Dispense: 6 mL; Refill: 1    MARCOS (generalized anxiety disorder): Limit caffeine intake with stress reduction and regular exercise as tolerated.  BuSpar increase to 7.5 mg t.i.d. as needed for anxiety;         -     busPIRone (BUSPAR) 7.5 MG tablet; Take 7.5 mg po TID as needed for anxiety. Generic  Dispense: 50 tablet; Refill: 2    Moderate episode of recurrent major depressive disorder; on sertraline w recent increase to 150 mg a day, 2mos ago Dr CASSIUS Campoverde psych; needs to schedule apt to revisit her in next few weeks.  Prescribed BuSpar 7.5 mg t.i.d. as needed for anxiety.  Also on Wellbutrin 300 mg XL daily generic.  -     busPIRone (BUSPAR) 7.5 MG tablet; Take 7.5 mg po TID as needed for anxiety. Generic  Dispense: 50 tablet; Refill: 2    Class 1 obesity with body mass index (BMI) of 33.0 to 33.9 in adult,  exercise regularly; adherence to his diet .     Dyslipidemia; Maintain low fat high fiber diet, exercise regularly. Weight reduction where indicated. Increase aerobic activity  -     Lipid Panel; Future; Expected date: 07/25/2023    Transaminitis; keep well hydrated at 6-8 glasses of fluid a day.  6/6 ALT 53.  Limit any alcohol intake as well as NSAID use.  -     Comprehensive Metabolic Panel; Future; Expected date: 07/25/2023    Side effect of medication; has used aleve occasional which may have raised LFT some. Keep well hydrtaed and limit NSDAID use; rare alcohol use. None in 18 mos.   -     Comprehensive Metabolic Panel; Future; Expected date: 07/25/2023    Encounter  for lab test: Labs reviewed and discussed with patient at length in ordered for follow-up

## 2023-07-27 ENCOUNTER — PATIENT MESSAGE (OUTPATIENT)
Dept: FAMILY MEDICINE | Facility: CLINIC | Age: 28
End: 2023-07-27
Payer: COMMERCIAL

## 2023-07-27 DIAGNOSIS — F33.42 RECURRENT MAJOR DEPRESSIVE DISORDER, IN FULL REMISSION: ICD-10-CM

## 2023-07-27 DIAGNOSIS — F41.8 ANXIETY WITH DEPRESSION: ICD-10-CM

## 2023-07-27 DIAGNOSIS — E11.65 TYPE 2 DIABETES MELLITUS WITH HYPERGLYCEMIA, WITHOUT LONG-TERM CURRENT USE OF INSULIN: Chronic | ICD-10-CM

## 2023-07-27 DIAGNOSIS — F41.1 GAD (GENERALIZED ANXIETY DISORDER): ICD-10-CM

## 2023-07-27 DIAGNOSIS — T88.7XXA SIDE EFFECT OF MEDICATION: Primary | ICD-10-CM

## 2023-07-27 DIAGNOSIS — E11.65 TYPE 2 DIABETES MELLITUS WITH HYPERGLYCEMIA, WITHOUT LONG-TERM CURRENT USE OF INSULIN: ICD-10-CM

## 2023-07-27 RX ORDER — INSULIN DETEMIR 100 [IU]/ML
INJECTION, SOLUTION SUBCUTANEOUS
Qty: 6 ML | Refills: 1 | Status: SHIPPED | OUTPATIENT
Start: 2023-07-27 | End: 2023-12-18 | Stop reason: SDUPTHER

## 2023-07-27 RX ORDER — BUPROPION HYDROCHLORIDE 300 MG/1
300 TABLET ORAL DAILY
Qty: 90 TABLET | Refills: 0 | Status: SHIPPED | OUTPATIENT
Start: 2023-07-27 | End: 2023-11-14 | Stop reason: SDUPTHER

## 2023-07-27 RX ORDER — SERTRALINE HYDROCHLORIDE 100 MG/1
150 TABLET, FILM COATED ORAL DAILY
Qty: 45 TABLET | Refills: 2 | Status: SHIPPED | OUTPATIENT
Start: 2023-07-27 | End: 2023-11-14 | Stop reason: SDUPTHER

## 2023-07-27 NOTE — TELEPHONE ENCOUNTER
Refill Decision Note   Roberto Carlos Souza  is requesting a refill authorization.  Brief Assessment and Rationale for Refill:  Approve     Medication Therapy Plan:         Comments:     Note composed:12:30 PM 07/27/2023

## 2023-07-27 NOTE — TELEPHONE ENCOUNTER
----- Message from Swetha Mercer sent at 7/27/2023  9:43 AM CDT -----  Contact: self  Type: Needs Medical Advice  Who Called:  Patient     Pharmacy name and phone #:    [id number] [place] - [address]  [address]  [place] [address]  Phone: [phone] Fax: [phone]        Best Call Back Number: [phone]    Additional Information: Pt states he would like to speak with office regarding getting some needles for   insulin detemir U-100, Levemir, (LEVEMIR FLEXPEN) 100 unit/mL (3 mL) InPn pen.Please call back

## 2023-07-27 NOTE — TELEPHONE ENCOUNTER
No care due was identified.  [facility] Embedded Care Due Messages. Reference number: [id number].   7/27/2023 11:39:20 AM CDT

## 2023-07-27 NOTE — TELEPHONE ENCOUNTER
No care due was identified.  [facility] Embedded Care Due Messages. Reference number: [id number].   7/27/2023 9:48:47 AM CDT

## 2023-07-28 ENCOUNTER — LAB VISIT (OUTPATIENT)
Dept: LAB | Facility: HOSPITAL | Age: 28
End: 2023-07-28
Attending: INTERNAL MEDICINE
Payer: COMMERCIAL

## 2023-07-28 DIAGNOSIS — E11.65 TYPE 2 DIABETES MELLITUS WITH HYPERGLYCEMIA, WITHOUT LONG-TERM CURRENT USE OF INSULIN: Chronic | ICD-10-CM

## 2023-07-28 DIAGNOSIS — T88.7XXA SIDE EFFECT OF MEDICATION: ICD-10-CM

## 2023-07-28 DIAGNOSIS — E78.5 DYSLIPIDEMIA: Chronic | ICD-10-CM

## 2023-07-28 LAB
ALBUMIN SERPL BCP-MCNC: 3.7 G/DL (ref 3.5–5.2)
ALP SERPL-CCNC: 80 U/L (ref 55–135)
ALT SERPL W/O P-5'-P-CCNC: 52 U/L (ref 10–44)
AMYLASE SERPL-CCNC: 42 U/L (ref 20–110)
ANION GAP SERPL CALC-SCNC: 12 MMOL/L (ref 8–16)
AST SERPL-CCNC: 27 U/L (ref 10–40)
BILIRUB SERPL-MCNC: 0.9 MG/DL (ref 0.1–1)
BUN SERPL-MCNC: 15 MG/DL (ref 6–20)
CALCIUM SERPL-MCNC: 9.3 MG/DL (ref 8.7–10.5)
CHLORIDE SERPL-SCNC: 103 MMOL/L (ref 95–110)
CHOLEST SERPL-MCNC: 220 MG/DL (ref 120–199)
CHOLEST/HDLC SERPL: 6.5 {RATIO} (ref 2–5)
CO2 SERPL-SCNC: 20 MMOL/L (ref 23–29)
CREAT SERPL-MCNC: 0.9 MG/DL (ref 0.5–1.4)
EST. GFR  (NO RACE VARIABLE): >60 ML/MIN/1.73 M^2
ESTIMATED AVG GLUCOSE: 246 MG/DL (ref 68–131)
GLUCOSE SERPL-MCNC: 230 MG/DL (ref 70–110)
HBA1C MFR BLD: 10.2 % (ref 4–5.6)
HDLC SERPL-MCNC: 34 MG/DL (ref 40–75)
HDLC SERPL: 15.5 % (ref 20–50)
LDLC SERPL CALC-MCNC: 116.2 MG/DL (ref 63–159)
LIPASE SERPL-CCNC: 13 U/L (ref 4–60)
NONHDLC SERPL-MCNC: 186 MG/DL
POTASSIUM SERPL-SCNC: 3.9 MMOL/L (ref 3.5–5.1)
PROT SERPL-MCNC: 6.7 G/DL (ref 6–8.4)
SODIUM SERPL-SCNC: 135 MMOL/L (ref 136–145)
TRIGL SERPL-MCNC: 349 MG/DL (ref 30–150)

## 2023-07-28 PROCEDURE — 80061 LIPID PANEL: CPT | Performed by: INTERNAL MEDICINE

## 2023-07-28 PROCEDURE — 80053 COMPREHEN METABOLIC PANEL: CPT | Performed by: INTERNAL MEDICINE

## 2023-07-28 PROCEDURE — 83690 ASSAY OF LIPASE: CPT | Performed by: INTERNAL MEDICINE

## 2023-07-28 PROCEDURE — 83036 HEMOGLOBIN GLYCOSYLATED A1C: CPT | Performed by: INTERNAL MEDICINE

## 2023-07-28 PROCEDURE — 82150 ASSAY OF AMYLASE: CPT | Performed by: INTERNAL MEDICINE

## 2023-07-28 PROCEDURE — 36415 COLL VENOUS BLD VENIPUNCTURE: CPT | Mod: PO | Performed by: INTERNAL MEDICINE

## 2023-07-28 NOTE — TELEPHONE ENCOUNTER
Please advise the patient to discontinue Mounjaro, and not to try any other medications that are GLP agonist for weight reduction.  He can use generic Zofran that he has already for any nausea/vomit and take Protonix generic pantoprazole 40 mg daily for GI upset, reflux or abdominal pain or diarrhea.  Please advise him not to take any caffeine.  He also can take Imodium over-the-counter as needed for diarrhea.  I would like him to obtain a CMP level along with amylase and lipase and call 24 hours later for the results; please see if he has any abdominal pain as well

## 2023-07-28 NOTE — TELEPHONE ENCOUNTER
Please advise patient should his condition worsen for him to go to the emergency room please; I would like him to change his pantoprazole dose to 40 mg b.i.d. for the next 2 weeks then he can reduce it to as needed daily for GI upset, reflux, abdominal pain or diarrhea.  He is to be scheduled an appointment to be seen by 1st available in the office in the next 10 days to 2 weeks.  He can also take Tums over-the-counter on an as-needed basis as well for GI upset.  Please have him obtain his labs tomorrow as fasting and call in 24 hours for the results

## 2023-08-08 ENCOUNTER — OFFICE VISIT (OUTPATIENT)
Dept: PSYCHIATRY | Facility: CLINIC | Age: 28
End: 2023-08-08
Payer: COMMERCIAL

## 2023-08-08 DIAGNOSIS — F90.0 ADHD (ATTENTION DEFICIT HYPERACTIVITY DISORDER), INATTENTIVE TYPE: ICD-10-CM

## 2023-08-08 PROCEDURE — 99499 NO LOS: ICD-10-PCS | Mod: S$GLB,,, | Performed by: PSYCHOLOGIST

## 2023-08-08 PROCEDURE — 99999 PR PBB SHADOW E&M-EST. PATIENT-LVL II: CPT | Mod: PBBFAC,,, | Performed by: PSYCHOLOGIST

## 2023-08-08 PROCEDURE — 99999 PR PBB SHADOW E&M-EST. PATIENT-LVL II: ICD-10-PCS | Mod: PBBFAC,,, | Performed by: PSYCHOLOGIST

## 2023-08-08 PROCEDURE — 96139 PR PSYCH/NEUROPSYCH TEST ADMIN/SCORING, BY TECH, 2+ TESTS, EA ADDTL 30 MIN: ICD-10-PCS | Mod: S$GLB,,, | Performed by: PSYCHOLOGIST

## 2023-08-08 PROCEDURE — 96130 PSYCL TST EVAL PHYS/QHP 1ST: CPT | Mod: S$GLB,,, | Performed by: PSYCHOLOGIST

## 2023-08-08 PROCEDURE — 96138 PR PSYCH/NEUROPSYCH TEST ADMIN/SCORING, BY TECH, 2+ TESTS, 1ST 30 MIN: ICD-10-PCS | Mod: S$GLB,,, | Performed by: PSYCHOLOGIST

## 2023-08-08 PROCEDURE — 99499 UNLISTED E&M SERVICE: CPT | Mod: S$GLB,,, | Performed by: PSYCHOLOGIST

## 2023-08-08 PROCEDURE — 96131 PR PSYCHOLOGIC TEST EVAL SVCS, EA ADDTL HR: ICD-10-PCS | Mod: S$GLB,,, | Performed by: PSYCHOLOGIST

## 2023-08-08 PROCEDURE — 96130 PR PSYCHOLOGIC TEST EVAL SVCS, 1ST HR: ICD-10-PCS | Mod: S$GLB,,, | Performed by: PSYCHOLOGIST

## 2023-08-08 PROCEDURE — 96139 PSYCL/NRPSYC TST TECH EA: CPT | Mod: S$GLB,,, | Performed by: PSYCHOLOGIST

## 2023-08-08 PROCEDURE — 96138 PSYCL/NRPSYC TECH 1ST: CPT | Mod: S$GLB,,, | Performed by: PSYCHOLOGIST

## 2023-08-08 PROCEDURE — 96131 PSYCL TST EVAL PHYS/QHP EA: CPT | Mod: S$GLB,,, | Performed by: PSYCHOLOGIST

## 2023-08-08 NOTE — PROGRESS NOTES
Outpatient Psychological Consultation    Name: Roberto Carlos Souza  MRN: [id number]  : 1995    Testing appointment: 2023    ID:  Patient presents for consultation for diagnostic clarity in regard to difficulties with attention/concentration    Reason for encounter Referral from Julia Campoverde NP     Psychiatric records were reviewed prior to the diagnostic interview. Comprehensive psychological assessment will not be documented in this report rather will be focused on the referral question. See prior notes for comprehensive psychiatric work-up.    Chief Complaint: Pt is a 28 year old male presenting as a referral from Julia Campoverde NP for diagnostic clarification due to report of difficulty concentration/poor attention    Psychological Assessments Administered  Wender [place] Rating Scale for the Attention Deficit Hyperactivity Disorder  Lester Adult ADHD Rating Scales-IV: Other-Report, current symptoms  [place]ester Adult ADHD Rating Scales-IV: Other-Report, childhood symptoms  Yolette Continuous Performance Test 3  The Dot Counting Test    Results    Wender [place] Rating Scale for the Attention Deficit Hyperactivity Disorder  The Wender [place] Rating Scale can be used to assess adults for Attention Deficit Hyperactivity Disorder with a subset of 25 questions associated with that diagnosis. It is a retrospective diagnosis of childhood ADHD.      Wender [place] Rating Scale Subscore = 75 (sum of the 25 questions endorsed that are associated with ADHD)    Scores vary from 1-100, and the cutoff score is 46.    Given pt's report of their own symptoms of ADHD in childhood, their response style does support a diagnosis of ADHD.    Lester Adult ADHD Rating Scales-IV: Other-Report, current symptoms  The BAARS-IV: Other-Report, current symptoms is a collateral measure of the patient's current symptoms of ADHD, as noted by someone with knowledge of the patient's executive functioning.    Dickson Souza is the evaluator whose  relationship to pt is his spouse.     Inattention total score: 31      Hyperactivity total score: 5      Impulsivity total score: 8      Sluggish Cognitive Tempo total score: 26    ADHD total score (sum of Inattention/Hyperactivity/Impulsivity Subsections): 44       Inattention Symptom Count: 8     Hyperactivity/Impulsivity Symptom Count: 0   ADHD Symptom Count total (sum of Inattention/Hyperactivity/Impulsivity Subsections): 8     Based on the evaluator's report of pt's symptoms, pt does often struggle with 8 symptoms of inattention but only 0 symptoms of hyperactivity/impulsivity. This supports a diagnosis of ADHD - predominately inattentive type    Lester Adult ADHD Rating Scales-IV: Other-Report, childhood symptoms  The BAARS-IV: Other-Report, childhood symptoms is a collateral measure of the patient's symptoms of ADHD between the ages of 5-12 years old, as reported by someone with knowledge of the patient's symptoms during childhood.    Juan Mancini is the evaluator whose relationship to pt is his sister.     Inattention total score: 34   Hyperactivity/Impulsivity total score: 25      Inattention Symptom Count: 9    Hyperactivity/Impulsivity Symptom Count: 5     Based on the evaluator's report of symptoms, pt did exhibit sufficient symptoms of inattention, hyperactivity, and impulsivity that affected multiple settings at an early age to meet the DSM-5 diagnosis criteria for an ADHD diagnosis.     Yolette Continuous Performance Test 3  The Yolette Continuous Performance Test 3rd Edition (Yolette CPT 3) is an objective, task-oriented computerized assessment of attention-related problems. This measure creates an index of the respondent's performance in areas of inattentiveness, impulsivity, sustained attention, and vigilance.    Pt's performance on this objective measure does indicate difficulties with sustained attention, hyperactivity, and difficulty maintaining vigilance with varying levels of  stimulus frequency but no difficulty with hyperactivity.    The Dot Counting Test  The Dot Counting Test (DCT) is a brief task that assesses test-taking effort in individuals.     Based on patient performance and score, pt appeared to demonstrate good effort.    Interpretation    Pt is a 28 year old male presenting as a referral from Julia Campoverde NP for diagnostic clarification due to report of difficulty concentration/poor attention. Pt reports attention/concentration concerns consistent with ADHD, predominantly inattentive type. Pt explained that his symptoms were present before the age of 10 and cause impairment in more than one setting.      Diagnosis     Attention-Deficit / Hyperactivity Disorder, predominately inattentive    Plan and Recommendations    Return for further psychiatric medication management with Julia Campoverde NP    Attention Tips Remember that inattention and lack of focus are major culprits to forgetting information so be sure and practice paying attention for adequate learning of information. If you rely on passive attention to remembering something (e.g., yeah, uh-huh approach), you'll find you cannot recall it later. I recommend the following to improve attention, which may aid in later recall:   Reduce distractions as much as possible.  Look at the person as they are speaking to you.   Paraphrase as they are speaking  Write down important pieces of information   Ask people to repeat if you zone out.    Have visual cues  (posted to-do-list, daily schedule) to remind you if you need to do something later.   Processing Speed Tips Using multiple modalities (e.g., listening, writing notes, asking questions, recording) to learn new information is likely to allow additional time for processing, thus improving memory for the material.   Allowing sufficient time to complete tasks will reduce frustration and help to ensure completion.  Spend a lot of up-front time planning in advance how long a  task may take and then chunk steps in the task so you don't wear yourself out.   Executive Functioning Tips: Don't attempt to multi-task.  Separate tasks so that each can be completed one at a time.  Consider using a calendar/day planner, as that may be effective to help you plan and stay on track.  Color-coding specific tasks by importance may add additional benefit to your planner.  Break down large projects into smaller tasks and write down the steps to completing the task.       BILLIN, [id number] X2 (90 minutes of testing and scoring with psychometrist), [id number], [id number] (2 hours of report writing, evaluation and feedback)

## 2023-08-31 ENCOUNTER — OFFICE VISIT (OUTPATIENT)
Dept: PSYCHIATRY | Facility: CLINIC | Age: 28
End: 2023-08-31
Payer: COMMERCIAL

## 2023-08-31 DIAGNOSIS — F33.41 RECURRENT MAJOR DEPRESSIVE DISORDER, IN PARTIAL REMISSION: Primary | ICD-10-CM

## 2023-08-31 DIAGNOSIS — F41.1 GAD (GENERALIZED ANXIETY DISORDER): Chronic | ICD-10-CM

## 2023-08-31 DIAGNOSIS — F90.0 ADHD (ATTENTION DEFICIT HYPERACTIVITY DISORDER), INATTENTIVE TYPE: ICD-10-CM

## 2023-08-31 PROCEDURE — 3072F PR LOW RISK FOR RETINOPATHY: ICD-10-PCS | Mod: CPTII,95,,

## 2023-08-31 PROCEDURE — 3046F PR MOST RECENT HEMOGLOBIN A1C LEVEL > 9.0%: ICD-10-PCS | Mod: CPTII,95,,

## 2023-08-31 PROCEDURE — 3060F PR POS MICROALBUMINURIA RESULT DOCUMENTED/REVIEW: ICD-10-PCS | Mod: CPTII,95,,

## 2023-08-31 PROCEDURE — 1160F RVW MEDS BY RX/DR IN RCRD: CPT | Mod: CPTII,95,,

## 2023-08-31 PROCEDURE — 1159F MED LIST DOCD IN RCRD: CPT | Mod: CPTII,95,,

## 2023-08-31 PROCEDURE — 3060F POS MICROALBUMINURIA REV: CPT | Mod: CPTII,95,,

## 2023-08-31 PROCEDURE — 3066F PR DOCUMENTATION OF TREATMENT FOR NEPHROPATHY: ICD-10-PCS | Mod: CPTII,95,,

## 2023-08-31 PROCEDURE — 3066F NEPHROPATHY DOC TX: CPT | Mod: CPTII,95,,

## 2023-08-31 PROCEDURE — 99214 PR OFFICE/OUTPT VISIT, EST, LEVL IV, 30-39 MIN: ICD-10-PCS | Mod: 95,,,

## 2023-08-31 PROCEDURE — 3072F LOW RISK FOR RETINOPATHY: CPT | Mod: CPTII,95,,

## 2023-08-31 PROCEDURE — 1159F PR MEDICATION LIST DOCUMENTED IN MEDICAL RECORD: ICD-10-PCS | Mod: CPTII,95,,

## 2023-08-31 PROCEDURE — 1160F PR REVIEW ALL MEDS BY PRESCRIBER/CLIN PHARMACIST DOCUMENTED: ICD-10-PCS | Mod: CPTII,95,,

## 2023-08-31 PROCEDURE — 3046F HEMOGLOBIN A1C LEVEL >9.0%: CPT | Mod: CPTII,95,,

## 2023-08-31 PROCEDURE — 99214 OFFICE O/P EST MOD 30 MIN: CPT | Mod: 95,,,

## 2023-08-31 RX ORDER — DEXTROAMPHETAMINE SACCHARATE, AMPHETAMINE ASPARTATE MONOHYDRATE, DEXTROAMPHETAMINE SULFATE AND AMPHETAMINE SULFATE 5; 5; 5; 5 MG/1; MG/1; MG/1; MG/1
20 CAPSULE, EXTENDED RELEASE ORAL EVERY MORNING
Qty: 30 CAPSULE | Refills: 0 | Status: SHIPPED | OUTPATIENT
Start: 2023-08-31 | End: 2023-09-11 | Stop reason: SDUPTHER

## 2023-08-31 NOTE — PROGRESS NOTES
"OUTPATIENT PSYCHIATRY FOLLOW UP VISIT    Encounter Date: 8/31/2023    Clinical Status of Patient:  Outpatient (Virtual)  The patient location is: [address] [place] [address]  The patient phone number is: [phone]   Visit type: Virtual visit with synchronous audio and video  Each patient to whom he or she provides medical services by telemedicine is:  (1) informed of the relationship between the practitioner and patient and the respective role of any other health care provider with respect to management of the patient; and (2) notified that he or she may decline to receive medical services by telemedicine and may withdraw from such care at any time.    Chief Complaint:  Roberto Carlos Souza is a 28 y.o. male who presents today for follow-up.  Met with patient.      HISTORY OF PRESENTING ILLNESS:  Roberto Carlos Souza is a 28 y.o. male with history of MDD and MARCOS who presents for follow up appointment.      INITIAL HPI:   Pt. is a 26 y.o. male, with a past psychiatric hx of depression and anxiety presenting to the clinic for an initial evaluation and treatment. PMHx outlined below. Pt is currently taking Prozac 20 mg daily and Wellbutrin  mg qam. Pt has taken these medications at various doses since 2015. Prozac was increased to 20 mg daily on 5/28/21 and Wellbutrin XL was increased to 300 mg on 10/5/21. Pt denies any improvement in symptoms with dose increases, and in fact, reports worsening of symptoms over the last 2-3 months, with "Way more bad days" than good. He requests a change in his medications.     He reports his first episode of depression was at age 10 when his mother was diagnosed with breast cancer, however, he was not treated at this time. He again became depressed around age 16 when he left for college as he felt he was forced by his parents into going to college. He was first treated for depression at age 20, when, "I hit rock bottom, and I wasn't responding to anything." He was started on " "Lexapro 10 mg daily by his PCP, however he reports no change in symptoms so he was switched to Prozac.     He states his depressive symptoms have been worsening slowly since the beginning of the pandemic and markedly worsened 2-3 months ago. He reports depressed mood, stating that now, "I have an exteremly bleak outlook. When I'm down its for at least 24 hours and there's nothing I can do to recover," whereas before, "I used to be able to take control by taking a few minutes," to lift his mood. He reports anhedonia, stating he has been unable to enjoy his usual hobbies recently, "I've just been sitting on the couch and being sad." He reports feelings of guilt and worthlessness and decreased ability to concentrate. He denies changes in sleep or appetite, stating he gets around 8 hours of sleep per night, but reports fatigue, "I am exhausted when I get home from work now." He denies SI/plan/intent, but states he feels sad because he feels like he, "Chose the wrong path in life," such as work and friendships. He reports significant anxiety and worry that he is unable to control about multiple areas of life including his wife, finances, and a general outlook of, "Anything that can go wrong will." He is "Always on edge" about his wife and reports occasional irritability.    3/15/2023: Patient states his mood has been on and off, wavering." He denies depressed mood but reports he has been, Checked out of everything, not interested in doing normal hobbies, and I don't really listen." He reports worsening difficulty with concentration.    Pt reports increased anxiety.  He and his wife are expecting their 1st child "and that's terrifying." INDERJIT is August 10th.  The patient reports that the baby is starting to move and it is becoming real in my mind.  Am I actually ready for this?  We been talking about it for years and now it is real.   Patient cites economic stability as a stressor and also states he believes he does not " "measure up to his own father in terms of parenting ability.  Encouraged patient to engage talk through these feelings of inadequacy.  Patient reports he has not taken BuSpar over the interim.  He also notes he forgot to schedule further psychological evaluation for his attention and concentration deficit and notes he would like to schedule this in the future.      Plan at last appointment on 3/15/2023:   Increase Zoloft to 150 mg daily for mood and residual anxiety  Continue Wellbutrin  mg q.a.m. for mood and attention and concentration deficit  Continue buspirone 5-7.5 mg p.o. t.i.d. p.r.n. anxiety  Referral previously placed to Psychology for evaluation of attention concentration deficit, patient states he will schedule  Offered referral for individual psychotherapy.  Pt given information on 3 months of free online therapy per the [facility] in partnership with NAYLA Jackson      Psychotropic medication history:   Lexapro      INTERVAL HISTORY:    "I don't know how my mood is, I have a 5 week old son. I'm not getting any sleep." Denies depressed mood or crying spells.      Anxiety is increased "way higher at this point, but that's because of my son."    Pt underwent psychological evaluation in the interim which did support a diagnosis of ADHD-IT.    Denies personal or family cardiac history.  He is interested in starting treatment with stimulant medications.    No interval episodes with symptoms consistent with benny or hypomania.  Denied interval or current suicidal/homicidal thoughts, intent, or plan or NSSI.  Denied other questions and concerns.    Medication side effects: None  Medication adherence: yes      PSYCHIATRIC REVIEW OF SYSTEMS:  Is patient experiencing or having changes in:  Trouble with sleep:  yes, 5 week old keeping Pt wake  Appetite changes:  no  Weight changes:  no  Lack of energy:  +fatigue r/t   Anhedonia:  yes, not interested in usual activities  Somatic " symptoms:  no  Anxiety/panic:  increased  Irritability: increasing  Guilty/hopeless:  no  Concentration:  difficulties with concentration at baseline, worsened recently  Racing thoughts: no  Impulsive behaviors: no  Paranoia/AVH: no  Self-injurious behavior/risky behavior:  no  Any drugs:  no  Alcohol:  ~1 / month, ~2 beers per occasion    MEDICAL REVIEW OF SYSTEMS:   Pain: Denies any significant chronic or acute pain.  Constitutional: Denies fever or change in appetite.  Cardiovascular: Denies chest pain or exertional dyspnea.  Respiratory: Artemio cough or orthopnea.   GI: Denies abdominal pain, N/V  Neurological: Denies tremor, seizure, or focal weakness.  Psychiatric: See HPI above.    PAST PSYCHIATRIC, MEDICAL, AND SOCIAL HISTORY REVIEWED  The patient's past medical, family and social history have been reviewed and updated as appropriate within the electronic medical record - see encounter notes.    PAST MEDICAL HISTORY:   Past Medical History:   Diagnosis Date    Anxiety     Diabetes mellitus, type 2 2015    Dyslipidemia     Hypertension     Previously on lisinopril    Obesity      Head trauma/Loss of consciousness: denies  Seizures: denies     PAST PSYCHIATRIC HISTORY:  First psych contact: at 20 for depression and anxiety    Prior hospitalizations: denies  Prior suicide attempts or self-harm: denies  Prior diagnosis: MDD, MARCOS  Prior meds: Lexapro  Current meds: Prozac 20 mg daily and Wellbutrin  mg qam  Prior psychotherapy: individual psychotherapy @ [age], recently with Dr. Nettles     FAMILY HISTORY:   Paternal: no psychiatric history or history of substance abuse or suicide  Maternal: mom probable ADHD;  no history of substance abuse or suicide     SOCIAL HISTORY:   Childhood: born and raised in [place], [place]  Marital Status:  for 2 years   Children: denies  Resides: [place]  Occupation: voltage   Hobbies: video games, card and board games    [employer]: denies  Education  level: Associates degree from [employer]  :  denies  Legal: denies    SUBSTANCE USE HISTORY:  Caffeine: diet coke 3-4 daily  Tobacco: denies  Alcohol: ~1 / month, ~2 beers per occasion  Drug use: denies  Rehab: denies  Prior/current AA: denies      MEDICATIONS:    Current Outpatient Medications:     atorvastatin (LIPITOR) 10 MG tablet, Take 1 tablet (10 mg total) by mouth once daily., Disp: 90 tablet, Rfl: 1    blood sugar diagnostic (CONTOUR NEXT STRIPS) Strp, 1 each by Misc.(Non-Drug; Combo Route) route once daily., Disp: 30 each, Rfl: 11    buPROPion (WELLBUTRIN XL) 300 MG 24 hr tablet, Take 1 tablet (300 mg total) by mouth once daily., Disp: 90 tablet, Rfl: 0    busPIRone (BUSPAR) 7.5 MG tablet, Take 7.5 mg po TID as needed for anxiety. Generic, Disp: 50 tablet, Rfl: 2    dextroamphetamine-amphetamine (ADDERALL XR) 20 MG 24 hr capsule, Take 1 capsule (20 mg total) by mouth every morning., Disp: 30 capsule, Rfl: 0    empagliflozin (JARDIANCE) 25 mg tablet, Take 1 tablet (25 mg total) by mouth once daily., Disp: 90 tablet, Rfl: 1    glimepiride (AMARYL) 2 MG tablet, TAKE 1 TABLET BY MOUTH TWICE DAILY WITH BREAKFAST AND DINNER, Disp: 180 tablet, Rfl: 1    insulin detemir U-100, Levemir, (LEVEMIR FLEXPEN) 100 unit/mL (3 mL) InPn pen, Inject 10 units sq every morning., Disp: 6 mL, Rfl: 1    lancets (MICROLET LANCET) Misc, 1 each by Misc.(Non-Drug; Combo Route) route 3 (three) times daily., Disp: 100 each, Rfl: 11    ondansetron (ZOFRAN) 4 MG tablet, Take 1 tablet (4 mg total) by mouth every 6 (six) hours as needed for Nausea (Or vomiting as needed)., Disp: 15 tablet, Rfl: 1    pantoprazole (PROTONIX) 40 MG tablet, Take 40 mg p.o. daily as needed for GI upset, reflux or abdominal pain, Disp: 90 tablet, Rfl: 1    sertraline (ZOLOFT) 100 MG tablet, Take 1.5 tablets (150 mg total) by mouth once daily. Generic please, Disp: 45 tablet, Rfl: 2    ALLERGIES:  Review of patient's allergies indicates:   Allergen Reactions  "   Mounjaro [tirzepatide] Other (See Comments)     Patient developed with using Mounjaro, indigestion, belching, abdominal pain, diarrhea, upset stomach and nausea.  Was advised to discontinue the medication; please see e-mail addressing the issue..  Above GI complaints were addressed.  Pain was rated about a 6; he was advised to go to the emergency room should it worsen    Metformin      Discontinued with hematochezia; symptoms markedly improved but still persistent; remain off metformin    Rybelsus [semaglutide] Other (See Comments)     abdominal pain, nausea       EXAM:  Constitutional  Vitals:  Most recent vital signs were reviewed.   Last 3 sets of VS:      12/15/2022    10:01 AM 6/28/2023     9:30 AM 7/25/2023    12:15 PM   Vitals - 1 value per visit   SYSTOLIC 136  136   DIASTOLIC 87  88   Pulse 74  71   Temp   98.8 °F (37.1 °C)   Resp   16   SPO2   96 %   Weight (lb) 256.84  254.41   Weight (kg) 116.5  115.4   Height 6' 1" (1.854 m)  6' 1" (1.854 m)   BMI (Calculated) 33.9  33.6   Pain Score Zero Three Zero      General:  unremarkable, age appropriate     Musculoskeletal  Muscle Strength/Tone:  No tremors appreciated   Gait & Station:  Unable to assess, Pt seated during virtual visit     Psychiatric  Speech:  no latency; no press   Mood & Affect:  anxious  congruent and appropriate   Thought Process:  normal and logical   Associations:  intact   Thought Content:  normal, no suicidality, no homicidality, delusions, or paranoia   Insight:  intact   Judgement: behavior is adequate to circumstances   Orientation:  grossly intact   Memory: intact for content of interview   Language: grossly intact   Attention Span & Concentration:  able to focus   Fund of Knowledge:  intact and appropriate to age and level of education     SUICIDE RISK ASSESSMENT:  Protective factors: age, gender, no prior attempts, no prior hospitalizations, no ongoing substance abuse, no psychosis, , wife is currently pregnant with their " 1st child, denies SI/intent/plan, seeking treatment, access to treatment, future oriented, good primary support, no access to firearms  Risks: ongoing anxiety and depression  Patient is a low immediate and long-term risk considering risk factors    RELEVANT LABS/STUDIES:    Lab Results   Component Value Date    WBC 5.83 05/14/2021    HGB 15.7 05/14/2021    HCT 46.0 05/14/2021    MCV 85 05/14/2021     07/19/2022     BMP  Lab Results   Component Value Date     (L) 07/28/2023    K 3.9 07/28/2023     07/28/2023    CO2 20 (L) 07/28/2023    BUN 15 07/28/2023    CREATININE 0.9 07/28/2023    CALCIUM 9.3 07/28/2023    ANIONGAP 12 07/28/2023    ESTGFRAFRICA >60.0 04/20/2022    EGFRNONAA >60.0 04/20/2022     Lab Results   Component Value Date    ALT 52 (H) 07/28/2023    AST 27 07/28/2023    ALKPHOS 80 07/28/2023    BILITOT 0.9 07/28/2023     Lab Results   Component Value Date    TSH 1.294 05/14/2021     Lab Results   Component Value Date    HGBA1C 10.2 (H) 07/28/2023       IMPRESSION:    Roberto Carlos Souza is a 28 y.o. male with history of MDD and MARCOS who presents for follow up appointment.    Status/Progress: Based on the examination today, the patient's problem(s) is/are inadequately controlled.  New problems have not been presented today.   Co-morbidities are not complicating management of the primary condition.  There are no active rule-out diagnoses for this patient at this time.       Risk Parameters:  Patient reports no suicidal ideation  Patient reports no homicidal ideation  Patient reports no self-injurious behavior  Patient reports no violent behavior    DIAGNOSES:    ICD-10-CM ICD-9-CM   1. Recurrent major depressive disorder, in partial remission  F33.41 296.35   2. MARCOS (generalized anxiety disorder)  F41.1 300.02   3. ADHD (attention deficit hyperactivity disorder), inattentive type  F90.0 314.00         PLAN:  Continue Zoloft 150 mg daily for mood and anxiety  Continue Wellbutrin  mg q.a.m.  for mood   Continue buspirone 5-7.5 mg p.o. t.i.d. p.r.n. anxiety  START dextroamphetamine-amphetamine XR 20 mg q.a.m. for ADHD  Offered referral for individual psychotherapy.      RETURN TO CLINIC:   1 month      ESTELLA Becerril, PMHNP-BC      30 minutes of total time spent on the encounter, which includes face to face time and non-face to face time preparing to see the patient (eg. review of tests), obtaining and/or reviewing separately obtained history, documenting clinical information in the electronic health record, independently interpreting results (not separately reported), and communicating results to the patient/family/caregiver, or care coordination (not separately reported).     At this time there are no indications the patient represents an imminent danger to either themselves or others; will continue to manage treatment in the outpatient setting.    I discussed the patient's care with the patient including benefits, alternatives, possible adverse effects of the treatment plan; including the potential for metabolic complications, major organ dysfunction, black box warnings, and contraindications. The opportunity was given for questions/clarification, and after this discussion the above treatment plan was devised through shared decision making. The patient voiced their understanding of the diagnoses and treatments listed above and agreed to the treatment plan. Follow up plan was reviewed with the patient. The patient was advised to call to report any worsening of symptoms or problems with medication.    Supportive therapy and psychoeducation provided. I discussed the importance of regular exercise, maintenance of a healthy weight, balanced diet rich in fruits/vegetables and lean protein, and avoidance of unhealthy habits like smoking and excessive alcohol intake. Educated patient about activating patient portal to receive education material. Patient agreed and understands that I will be providing  "reading material to help understand treatment.     Patient has been given crisis information including Suicide and Crisis Lifeline (call or text: 988), Crisis Text Hotline (text: HOME to 499158). Patient also given instructions to go to the nearest ER or call 911 if unable to remain safe or if the Pt develops thoughts of harming self or others.    [facility]: Reviewed today to detect potential controlled substance misuse, diversion, excessive prescribing, or multiple providers prescribing controlled substances. The patients report was deemed appropriate without new medications of concerns prescribed by other providers.    Documentation entered by me for this encounter may have been done in part using [employer] Direct voice recognition transcription software. Garbled syntax, mangled pronouns, and other bizarre constructions may be attributed to that software system. Although I have made an effort to ensure accuracy, "sound like" errors may exist and should be interpreted in context.      "

## 2023-09-10 ENCOUNTER — PATIENT MESSAGE (OUTPATIENT)
Dept: PSYCHIATRY | Facility: CLINIC | Age: 28
End: 2023-09-10
Payer: COMMERCIAL

## 2023-09-11 RX ORDER — DEXTROAMPHETAMINE SACCHARATE, AMPHETAMINE ASPARTATE MONOHYDRATE, DEXTROAMPHETAMINE SULFATE AND AMPHETAMINE SULFATE 5; 5; 5; 5 MG/1; MG/1; MG/1; MG/1
20 CAPSULE, EXTENDED RELEASE ORAL EVERY MORNING
Qty: 30 CAPSULE | Refills: 0 | Status: SHIPPED | OUTPATIENT
Start: 2023-09-11 | End: 2023-11-14 | Stop reason: SDUPTHER

## 2023-10-04 ENCOUNTER — PATIENT MESSAGE (OUTPATIENT)
Dept: ADMINISTRATIVE | Facility: HOSPITAL | Age: 28
End: 2023-10-04
Payer: COMMERCIAL

## 2023-10-16 ENCOUNTER — PATIENT MESSAGE (OUTPATIENT)
Dept: PSYCHIATRY | Facility: CLINIC | Age: 28
End: 2023-10-16
Payer: COMMERCIAL

## 2023-11-14 ENCOUNTER — OFFICE VISIT (OUTPATIENT)
Dept: PSYCHIATRY | Facility: CLINIC | Age: 28
End: 2023-11-14
Payer: COMMERCIAL

## 2023-11-14 DIAGNOSIS — F41.1 GAD (GENERALIZED ANXIETY DISORDER): ICD-10-CM

## 2023-11-14 DIAGNOSIS — F90.0 ADHD (ATTENTION DEFICIT HYPERACTIVITY DISORDER), INATTENTIVE TYPE: Primary | ICD-10-CM

## 2023-11-14 DIAGNOSIS — F41.8 ANXIETY WITH DEPRESSION: ICD-10-CM

## 2023-11-14 DIAGNOSIS — F33.42 RECURRENT MAJOR DEPRESSIVE DISORDER, IN FULL REMISSION: ICD-10-CM

## 2023-11-14 PROCEDURE — 3046F HEMOGLOBIN A1C LEVEL >9.0%: CPT | Mod: CPTII,95,,

## 2023-11-14 PROCEDURE — 3060F POS MICROALBUMINURIA REV: CPT | Mod: CPTII,95,,

## 2023-11-14 PROCEDURE — 1159F PR MEDICATION LIST DOCUMENTED IN MEDICAL RECORD: ICD-10-PCS | Mod: CPTII,95,,

## 2023-11-14 PROCEDURE — 1160F PR REVIEW ALL MEDS BY PRESCRIBER/CLIN PHARMACIST DOCUMENTED: ICD-10-PCS | Mod: CPTII,95,,

## 2023-11-14 PROCEDURE — 1159F MED LIST DOCD IN RCRD: CPT | Mod: CPTII,95,,

## 2023-11-14 PROCEDURE — 3072F LOW RISK FOR RETINOPATHY: CPT | Mod: CPTII,95,,

## 2023-11-14 PROCEDURE — 3060F PR POS MICROALBUMINURIA RESULT DOCUMENTED/REVIEW: ICD-10-PCS | Mod: CPTII,95,,

## 2023-11-14 PROCEDURE — 3066F PR DOCUMENTATION OF TREATMENT FOR NEPHROPATHY: ICD-10-PCS | Mod: CPTII,95,,

## 2023-11-14 PROCEDURE — 99214 PR OFFICE/OUTPT VISIT, EST, LEVL IV, 30-39 MIN: ICD-10-PCS | Mod: 95,,,

## 2023-11-14 PROCEDURE — 3046F PR MOST RECENT HEMOGLOBIN A1C LEVEL > 9.0%: ICD-10-PCS | Mod: CPTII,95,,

## 2023-11-14 PROCEDURE — 3066F NEPHROPATHY DOC TX: CPT | Mod: CPTII,95,,

## 2023-11-14 PROCEDURE — 1160F RVW MEDS BY RX/DR IN RCRD: CPT | Mod: CPTII,95,,

## 2023-11-14 PROCEDURE — 3072F PR LOW RISK FOR RETINOPATHY: ICD-10-PCS | Mod: CPTII,95,,

## 2023-11-14 PROCEDURE — 99214 OFFICE O/P EST MOD 30 MIN: CPT | Mod: 95,,,

## 2023-11-14 RX ORDER — SERTRALINE HYDROCHLORIDE 100 MG/1
150 TABLET, FILM COATED ORAL DAILY
Qty: 135 TABLET | Refills: 0 | Status: SHIPPED | OUTPATIENT
Start: 2023-11-14 | End: 2024-01-16

## 2023-11-14 RX ORDER — DEXTROAMPHETAMINE SACCHARATE, AMPHETAMINE ASPARTATE MONOHYDRATE, DEXTROAMPHETAMINE SULFATE AND AMPHETAMINE SULFATE 7.5; 7.5; 7.5; 7.5 MG/1; MG/1; MG/1; MG/1
30 CAPSULE, EXTENDED RELEASE ORAL EVERY MORNING
Qty: 30 CAPSULE | Refills: 0 | Status: SHIPPED | OUTPATIENT
Start: 2023-11-14 | End: 2023-12-29 | Stop reason: SDUPTHER

## 2023-11-14 RX ORDER — BUPROPION HYDROCHLORIDE 300 MG/1
300 TABLET ORAL DAILY
Qty: 90 TABLET | Refills: 0 | Status: SHIPPED | OUTPATIENT
Start: 2023-11-14 | End: 2024-01-23 | Stop reason: SDUPTHER

## 2023-11-14 NOTE — PROGRESS NOTES
"OUTPATIENT PSYCHIATRY FOLLOW UP VISIT    Encounter Date: 11/14/2023    Clinical Status of Patient:  Outpatient (Virtual)  The patient location is: [address] [place] [address]  The patient phone number is: [phone]   Visit type: Virtual visit with synchronous audio and video  Each patient to whom he or she provides medical services by telemedicine is:  (1) informed of the relationship between the practitioner and patient and the respective role of any other health care provider with respect to management of the patient; and (2) notified that he or she may decline to receive medical services by telemedicine and may withdraw from such care at any time.    Chief Complaint:  Roberto Carlos Souza is a 28 y.o. male who presents today for follow-up.  Met with patient.      HISTORY OF PRESENTING ILLNESS:  Roberto Carlos Souza is a 28 y.o. male with history of MDD and MARCOS who presents for follow up appointment.      INITIAL HPI:   Pt. is a 26 y.o. male, with a past psychiatric hx of depression and anxiety presenting to the clinic for an initial evaluation and treatment. PMHx outlined below. Pt is currently taking Prozac 20 mg daily and Wellbutrin  mg qam. Pt has taken these medications at various doses since 2015. Prozac was increased to 20 mg daily on 5/28/21 and Wellbutrin XL was increased to 300 mg on 10/5/21. Pt denies any improvement in symptoms with dose increases, and in fact, reports worsening of symptoms over the last 2-3 months, with "Way more bad days" than good. He requests a change in his medications.     He reports his first episode of depression was at age 10 when his mother was diagnosed with breast cancer, however, he was not treated at this time. He again became depressed around age 16 when he left for college as he felt he was forced by his parents into going to college. He was first treated for depression at age 20, when, "I hit rock bottom, and I wasn't responding to anything." He was started on " "Lexapro 10 mg daily by his PCP, however he reports no change in symptoms so he was switched to Prozac.     He states his depressive symptoms have been worsening slowly since the beginning of the pandemic and markedly worsened 2-3 months ago. He reports depressed mood, stating that now, "I have an exteremly bleak outlook. When I'm down its for at least 24 hours and there's nothing I can do to recover," whereas before, "I used to be able to take control by taking a few minutes," to lift his mood. He reports anhedonia, stating he has been unable to enjoy his usual hobbies recently, "I've just been sitting on the couch and being sad." He reports feelings of guilt and worthlessness and decreased ability to concentrate. He denies changes in sleep or appetite, stating he gets around 8 hours of sleep per night, but reports fatigue, "I am exhausted when I get home from work now." He denies SI/plan/intent, but states he feels sad because he feels like he, "Chose the wrong path in life," such as work and friendships. He reports significant anxiety and worry that he is unable to control about multiple areas of life including his wife, finances, and a general outlook of, "Anything that can go wrong will." He is "Always on edge" about his wife and reports occasional irritability.    3/15/2023: Patient states his mood has been on and off, wavering." He denies depressed mood but reports he has been, Checked out of everything, not interested in doing normal hobbies, and I don't really listen." He reports worsening difficulty with concentration.    Pt reports increased anxiety.  He and his wife are expecting their 1st child "and that's terrifying." INDERJIT is August 10th.  The patient reports that the baby is starting to move and it is becoming real in my mind.  Am I actually ready for this?  We been talking about it for years and now it is real.   Patient cites economic stability as a stressor and also states he believes he does not " "measure up to his own father in terms of parenting ability.  Encouraged patient to engage talk through these feelings of inadequacy.  Patient reports he has not taken BuSpar over the interim.  He also notes he forgot to schedule further psychological evaluation for his attention and concentration deficit and notes he would like to schedule this in the future.    8/31/2023: "I don't know how my mood is, I have a 5 week old son. I'm not getting any sleep." Denies depressed mood or crying spells.    Anxiety is increased "way higher at this point, but that's because of my son."  Pt underwent psychological evaluation in the interim which did support a diagnosis of ADHD-IT.  Denies personal or family cardiac history.  He is interested in starting treatment with stimulant medications.      Plan at last appointment:   Continue Zoloft 150 mg daily for mood and anxiety  Continue Wellbutrin  mg q.a.m. for mood   Continue buspirone 5-7.5 mg t.i.d. p.r.n. anxiety   START dextroamphetamine-amphetamine XR 20 mg q.a.m. for ADHD  Offered referral for individual psychotherapy      Psychotropic medication history:   Lexapro      INTERVAL HISTORY:    ADHD   Inattentive symptoms:  continue uncontrolled  Hyperactive symptoms:  continue uncontrolled, ria fidgeting and blurting out  Behavior at home:  Stable  Behavior at work:  Stable, but not as productive as he could be  Side effects:  Pt denies cardiovascular events including increased heart rate or increased blood pressure, palpitations, chest pain, dizziness, lightheadedness, or syncopal episodes. Pt denies A/V hallucinations or behavioral effects. Pt denies anorexia, decreased appetite, xerostomia, headache, insomnia, or digital changes.     Patient reports no change in symptoms of ADHD after starting dextroamphetamine-amphetamine XR 20 mg q.a.m.  He states his wife also confirms she has not seen improvement in the patient's symptoms either  Mood continues to be stable "   Anxiety is well controlled.  Baby is 3 months, is currently ill, this is his 1st illness episode. Was sleeping 6-7 hours prior to illness.     No interval episodes with symptoms consistent with benny or hypomania.  Denied interval or current suicidal/homicidal thoughts, intent, or plan or NSSI.  Denied other questions and concerns.    Medication side effects: None  Medication adherence: yes      PSYCHIATRIC REVIEW OF SYSTEMS:  Is patient experiencing or having changes in:  Trouble with sleep:  no, with the exception of last several days as infant has been ill and keeping he and his wife awake at night  Appetite changes:  no  Weight changes:  no  Lack of energy:  +fatigue r/t   Anhedonia:  yes, continued lack of interested in usual activities  Somatic symptoms:  no  Anxiety/panic:improving  Irritability: improving  Guilty/hopeless:  no  Concentration:  difficulties with concentration at baseline, no change after starting dextroamphetamine-amphetamine   Racing thoughts: no  Impulsive behaviors: no  Paranoia/AVH: no  Self-injurious behavior/risky behavior:  no  Any drugs:  no  Alcohol:  ~1 / month, ~2 beers per occasion    MEDICAL REVIEW OF SYSTEMS:   Pain: Denies any significant chronic or acute pain.  Constitutional: Denies fever or change in appetite.  Cardiovascular: Denies chest pain or exertional dyspnea.  Respiratory: Artemio cough or orthopnea.   GI: Denies abdominal pain, N/V  Neurological: Denies tremor, seizure, or focal weakness.  Psychiatric: See HPI above.    PAST PSYCHIATRIC, MEDICAL, AND SOCIAL HISTORY REVIEWED  The patient's past medical, family and social history have been reviewed and updated as appropriate within the electronic medical record - see encounter notes.    PAST MEDICAL HISTORY:   Past Medical History:   Diagnosis Date    Anxiety     Diabetes mellitus, type 2 2015    Dyslipidemia     Hypertension     Previously on lisinopril    Obesity      Head trauma/Loss of consciousness:  denies  Seizures: denies     PAST PSYCHIATRIC HISTORY:  First psych contact: at 20 for depression and anxiety    Prior hospitalizations: denies  Prior suicide attempts or self-harm: denies  Prior diagnosis: MDD, MARCOS  Prior psychotherapy: individual psychotherapy @ [age], recently with Dr. Nettles     FAMILY HISTORY:   Paternal: no psychiatric history or history of substance abuse or suicide  Maternal: mom probable ADHD;  no history of substance abuse or suicide     SOCIAL HISTORY:   Childhood: born and raised in [place], [place]  Marital Status:  for 2 years   Children: denies  Resides: [place]  Occupation: voltage   Hobbies: [employer] games, card and board [employer]    Christian: denies  Education level: Associates degree from [employer]  :  denies  Legal: denies    SUBSTANCE USE HISTORY:  Caffeine: diet coke 3-4 daily  Tobacco: denies  Alcohol: ~1 / month, ~2 beers per occasion  Drug use: denies  Rehab: denies  Prior/current AA: denies      MEDICATIONS:    Current Outpatient Medications:     atorvastatin (LIPITOR) 10 MG tablet, Take 1 tablet (10 mg total) by mouth once daily., Disp: 90 tablet, Rfl: 1    blood sugar diagnostic (CONTOUR NEXT STRIPS) Strp, 1 each by Misc.(Non-Drug; Combo Route) route once daily., Disp: 30 each, Rfl: 11    buPROPion (WELLBUTRIN XL) 300 MG 24 hr tablet, Take 1 tablet (300 mg total) by mouth once daily., Disp: 90 tablet, Rfl: 0    busPIRone (BUSPAR) 7.5 MG tablet, Take 7.5 mg po TID as needed for anxiety. Generic, Disp: 50 tablet, Rfl: 2    dextroamphetamine-amphetamine (ADDERALL XR) 30 MG 24 hr capsule, Take 1 capsule (30 mg total) by mouth every morning., Disp: 30 capsule, Rfl: 0    empagliflozin (JARDIANCE) 25 mg tablet, Take 1 tablet (25 mg total) by mouth once daily., Disp: 90 tablet, Rfl: 1    glimepiride (AMARYL) 2 MG tablet, TAKE 1 TABLET BY MOUTH TWICE DAILY WITH BREAKFAST AND DINNER, Disp: 180 tablet, Rfl: 1    insulin detemir U-100, Levemir, (LEVEMIR  "FLEXPEN) 100 unit/mL (3 mL) InPn pen, Inject 10 units sq every morning., Disp: 6 mL, Rfl: 1    lancets (MICROLET LANCET) Misc, 1 each by Misc.(Non-Drug; Combo Route) route 3 (three) times daily., Disp: 100 each, Rfl: 11    ondansetron (ZOFRAN) 4 MG tablet, Take 1 tablet (4 mg total) by mouth every 6 (six) hours as needed for Nausea (Or vomiting as needed)., Disp: 15 tablet, Rfl: 1    pantoprazole (PROTONIX) 40 MG tablet, Take 40 mg p.o. daily as needed for GI upset, reflux or abdominal pain, Disp: 90 tablet, Rfl: 1    sertraline (ZOLOFT) 100 MG tablet, Take 1.5 tablets (150 mg total) by mouth once daily. Generic please, Disp: 135 tablet, Rfl: 0    ALLERGIES:  Review of patient's allergies indicates:   Allergen Reactions    Mounjaro [tirzepatide] Other (See Comments)     Patient developed with using Mounjaro, indigestion, belching, abdominal pain, diarrhea, upset stomach and nausea.  Was advised to discontinue the medication; please see e-mail addressing the issue..  Above GI complaints were addressed.  Pain was rated about a 6; he was advised to go to the emergency room should it worsen    Metformin      Discontinued with hematochezia; symptoms markedly improved but still persistent; remain off metformin    Rybelsus [semaglutide] Other (See Comments)     abdominal pain, nausea       EXAM:  Constitutional  Vitals:  Most recent vital signs were reviewed.   Last 3 sets of VS:      12/15/2022    10:01 AM 6/28/2023     9:30 AM 7/25/2023    12:15 PM   Vitals - 1 value per visit   SYSTOLIC 136  136   DIASTOLIC 87  88   Pulse 74  71   Temp   98.8 °F (37.1 °C)   Resp   16   SPO2   96 %   Weight (lb) 256.84  254.41   Weight (kg) 116.5  115.4   Height 6' 1" (1.854 m)  6' 1" (1.854 m)   BMI (Calculated) 33.9  33.6   Pain Score Zero Three Zero      General:  unremarkable, age appropriate     Musculoskeletal  Muscle Strength/Tone:  No tremors appreciated   Gait & Station:  Unable to assess, Pt seated during virtual visit "     Psychiatric  Speech:  no latency; no press   Mood & Affect:  euthymic  congruent and appropriate   Thought Process:  normal and logical   Associations:  intact   Thought Content:  normal, no suicidality, no homicidality, delusions, or paranoia   Insight:  intact   Judgement: behavior is adequate to circumstances   Orientation:  grossly intact   Memory: intact for content of interview   Language: grossly intact   Attention Span & Concentration:  able to focus   Fund of Knowledge:  intact and appropriate to age and level of education     SUICIDE RISK ASSESSMENT:  Protective factors: age, gender, no prior attempts, no prior hospitalizations, no ongoing substance abuse, no psychosis, , wife is currently pregnant with their 1st child, denies SI/intent/plan, seeking treatment, access to treatment, future oriented, good primary support, no access to firearms  Risks: ongoing anxiety and depression  Patient is a low immediate and long-term risk considering risk factors    RELEVANT LABS/STUDIES:    Lab Results   Component Value Date    WBC 5.83 05/14/2021    HGB 15.7 05/14/2021    HCT 46.0 05/14/2021    MCV 85 05/14/2021     07/19/2022     BMP  Lab Results   Component Value Date     (L) 07/28/2023    K 3.9 07/28/2023     07/28/2023    CO2 20 (L) 07/28/2023    BUN 15 07/28/2023    CREATININE 0.9 07/28/2023    CALCIUM 9.3 07/28/2023    ANIONGAP 12 07/28/2023    ESTGFRAFRICA >60.0 04/20/2022    EGFRNONAA >60.0 04/20/2022     Lab Results   Component Value Date    ALT 52 (H) 07/28/2023    AST 27 07/28/2023    ALKPHOS 80 07/28/2023    BILITOT 0.9 07/28/2023     Lab Results   Component Value Date    TSH 1.294 05/14/2021     Lab Results   Component Value Date    HGBA1C 10.2 (H) 07/28/2023       IMPRESSION:    Roberto Carlos Souza is a 28 y.o. male with history of MDD and MARCOS who presents for follow up appointment.    Status/Progress: Based on the examination today, the patient's problem(s) is/are adequately  but not ideally controlled.  New problems have not been presented today.   Co-morbidities are not complicating management of the primary condition.  There are no active rule-out diagnoses for this patient at this time.     Patient reports no improvement in symptoms of ADHD after starting dextroamphetamine-amphetamine XR 20 mg q.a.m. at last visit.  Through shared decision making, dextroamphetamine amphetamine XR will be increased to 30 mg q.a.m. with follow up in 1 month.    Risk Parameters:  Patient reports no suicidal ideation  Patient reports no homicidal ideation  Patient reports no self-injurious behavior  Patient reports no violent behavior    DIAGNOSES:    ICD-10-CM ICD-9-CM   1. ADHD (attention deficit hyperactivity disorder), inattentive type  F90.0 314.00   2. Recurrent major depressive disorder, in full remission  F33.42 296.36   3. MARCOS (generalized anxiety disorder)  F41.1 300.02   4. Anxiety with depression  F41.8 300.4         PLAN:  Continue sertraline 150 mg daily for mood and anxiety  Continue bupropion  mg q.a.m. for mood   Continue buspirone 5-7.5 mg p.o. t.i.d. p.r.n. anxiety  INCREASE dextroamphetamine-amphetamine XR from 20 to 30 mg q.a.m. for ADHD  Offered referral for individual psychotherapy      RETURN TO CLINIC:   1 month      ESTELLA Becerril, PMHNP-BC      30 minutes of total time spent on the encounter, which includes face to face time and non-face to face time preparing to see the patient (eg. review of tests), obtaining and/or reviewing separately obtained history, documenting clinical information in the electronic health record, independently interpreting results (not separately reported), and communicating results to the patient/family/caregiver, or care coordination (not separately reported).     At this time there are no indications the patient represents an imminent danger to either themselves or others; will continue to manage treatment in the outpatient setting.    I  "discussed the patient's care with the patient including benefits, alternatives, possible adverse effects of the treatment plan; including the potential for metabolic complications, major organ dysfunction, black box warnings, and contraindications. The opportunity was given for questions/clarification, and after this discussion the above treatment plan was devised through shared decision making. The patient voiced their understanding of the diagnoses and treatments listed above and agreed to the treatment plan. Follow up plan was reviewed with the patient. The patient was advised to call to report any worsening of symptoms or problems with medication.    Supportive therapy and psychoeducation provided. Patient has been given crisis information including Suicide and Crisis Lifeline (call or text: 397). Patient also given instructions to go to the nearest ER or call 911 if unable to remain safe or if the Pt develops thoughts of harming self or others.    Documentation entered by me for this encounter may have been done in part using [employer] Direct voice recognition transcription software. Garbled syntax, mangled pronouns, and other bizarre constructions may be attributed to that software system. Although I have made an effort to ensure accuracy, "sound like" errors may exist and should be interpreted in context.    "

## 2023-12-06 ENCOUNTER — PATIENT MESSAGE (OUTPATIENT)
Dept: FAMILY MEDICINE | Facility: CLINIC | Age: 28
End: 2023-12-06
Payer: COMMERCIAL

## 2023-12-06 DIAGNOSIS — Z00.00 PREVENTATIVE HEALTH CARE: Primary | ICD-10-CM

## 2023-12-09 ENCOUNTER — LAB VISIT (OUTPATIENT)
Dept: LAB | Facility: HOSPITAL | Age: 28
End: 2023-12-09
Attending: STUDENT IN AN ORGANIZED HEALTH CARE EDUCATION/TRAINING PROGRAM
Payer: COMMERCIAL

## 2023-12-09 DIAGNOSIS — E11.65 TYPE 2 DIABETES MELLITUS WITH HYPERGLYCEMIA, WITHOUT LONG-TERM CURRENT USE OF INSULIN: Chronic | ICD-10-CM

## 2023-12-09 DIAGNOSIS — T88.7XXA SIDE EFFECT OF MEDICATION: ICD-10-CM

## 2023-12-09 DIAGNOSIS — R74.01 TRANSAMINITIS: ICD-10-CM

## 2023-12-09 DIAGNOSIS — Z00.00 PREVENTATIVE HEALTH CARE: ICD-10-CM

## 2023-12-09 LAB
ALBUMIN SERPL BCP-MCNC: 3.7 G/DL (ref 3.5–5.2)
ALBUMIN SERPL BCP-MCNC: 3.7 G/DL (ref 3.5–5.2)
ALP SERPL-CCNC: 80 U/L (ref 55–135)
ALP SERPL-CCNC: 80 U/L (ref 55–135)
ALT SERPL W/O P-5'-P-CCNC: 46 U/L (ref 10–44)
ALT SERPL W/O P-5'-P-CCNC: 46 U/L (ref 10–44)
ANION GAP SERPL CALC-SCNC: 11 MMOL/L (ref 8–16)
ANION GAP SERPL CALC-SCNC: 11 MMOL/L (ref 8–16)
AST SERPL-CCNC: 25 U/L (ref 10–40)
AST SERPL-CCNC: 25 U/L (ref 10–40)
BASOPHILS # BLD AUTO: 0.05 K/UL (ref 0–0.2)
BASOPHILS NFR BLD: 0.7 % (ref 0–1.9)
BILIRUB SERPL-MCNC: 1.1 MG/DL (ref 0.1–1)
BILIRUB SERPL-MCNC: 1.1 MG/DL (ref 0.1–1)
BUN SERPL-MCNC: 11 MG/DL (ref 6–20)
BUN SERPL-MCNC: 11 MG/DL (ref 6–20)
CALCIUM SERPL-MCNC: 9.5 MG/DL (ref 8.7–10.5)
CALCIUM SERPL-MCNC: 9.5 MG/DL (ref 8.7–10.5)
CHLORIDE SERPL-SCNC: 104 MMOL/L (ref 95–110)
CHLORIDE SERPL-SCNC: 104 MMOL/L (ref 95–110)
CHOLEST SERPL-MCNC: 252 MG/DL (ref 120–199)
CHOLEST/HDLC SERPL: 7.4 {RATIO} (ref 2–5)
CO2 SERPL-SCNC: 21 MMOL/L (ref 23–29)
CO2 SERPL-SCNC: 21 MMOL/L (ref 23–29)
CREAT SERPL-MCNC: 0.8 MG/DL (ref 0.5–1.4)
CREAT SERPL-MCNC: 0.8 MG/DL (ref 0.5–1.4)
DIFFERENTIAL METHOD: NORMAL
EOSINOPHIL # BLD AUTO: 0.1 K/UL (ref 0–0.5)
EOSINOPHIL NFR BLD: 1.7 % (ref 0–8)
ERYTHROCYTE [DISTWIDTH] IN BLOOD BY AUTOMATED COUNT: 11.9 % (ref 11.5–14.5)
EST. GFR  (NO RACE VARIABLE): >60 ML/MIN/1.73 M^2
EST. GFR  (NO RACE VARIABLE): >60 ML/MIN/1.73 M^2
ESTIMATED AVG GLUCOSE: 266 MG/DL (ref 68–131)
GLUCOSE SERPL-MCNC: 275 MG/DL (ref 70–110)
GLUCOSE SERPL-MCNC: 275 MG/DL (ref 70–110)
HBA1C MFR BLD: 10.9 % (ref 4–5.6)
HCT VFR BLD AUTO: 47.9 % (ref 40–54)
HDLC SERPL-MCNC: 34 MG/DL (ref 40–75)
HDLC SERPL: 13.5 % (ref 20–50)
HGB BLD-MCNC: 17 G/DL (ref 14–18)
IMM GRANULOCYTES # BLD AUTO: 0.03 K/UL (ref 0–0.04)
IMM GRANULOCYTES NFR BLD AUTO: 0.4 % (ref 0–0.5)
LDLC SERPL CALC-MCNC: 169.4 MG/DL (ref 63–159)
LYMPHOCYTES # BLD AUTO: 2.5 K/UL (ref 1–4.8)
LYMPHOCYTES NFR BLD: 32.5 % (ref 18–48)
MCH RBC QN AUTO: 29.3 PG (ref 27–31)
MCHC RBC AUTO-ENTMCNC: 35.5 G/DL (ref 32–36)
MCV RBC AUTO: 82 FL (ref 82–98)
MONOCYTES # BLD AUTO: 0.5 K/UL (ref 0.3–1)
MONOCYTES NFR BLD: 7.1 % (ref 4–15)
NEUTROPHILS # BLD AUTO: 4.4 K/UL (ref 1.8–7.7)
NEUTROPHILS NFR BLD: 57.6 % (ref 38–73)
NONHDLC SERPL-MCNC: 218 MG/DL
NRBC BLD-RTO: 0 /100 WBC
PLATELET # BLD AUTO: 298 K/UL (ref 150–450)
PMV BLD AUTO: 9.9 FL (ref 9.2–12.9)
POTASSIUM SERPL-SCNC: 4.1 MMOL/L (ref 3.5–5.1)
POTASSIUM SERPL-SCNC: 4.1 MMOL/L (ref 3.5–5.1)
PROT SERPL-MCNC: 7.1 G/DL (ref 6–8.4)
PROT SERPL-MCNC: 7.1 G/DL (ref 6–8.4)
RBC # BLD AUTO: 5.81 M/UL (ref 4.6–6.2)
SODIUM SERPL-SCNC: 136 MMOL/L (ref 136–145)
SODIUM SERPL-SCNC: 136 MMOL/L (ref 136–145)
TRIGL SERPL-MCNC: 243 MG/DL (ref 30–150)
TSH SERPL DL<=0.005 MIU/L-ACNC: 0.86 UIU/ML (ref 0.4–4)
WBC # BLD AUTO: 7.59 K/UL (ref 3.9–12.7)

## 2023-12-09 PROCEDURE — 80053 COMPREHEN METABOLIC PANEL: CPT | Performed by: INTERNAL MEDICINE

## 2023-12-09 PROCEDURE — 80061 LIPID PANEL: CPT | Performed by: STUDENT IN AN ORGANIZED HEALTH CARE EDUCATION/TRAINING PROGRAM

## 2023-12-09 PROCEDURE — 83036 HEMOGLOBIN GLYCOSYLATED A1C: CPT | Performed by: STUDENT IN AN ORGANIZED HEALTH CARE EDUCATION/TRAINING PROGRAM

## 2023-12-09 PROCEDURE — 36415 COLL VENOUS BLD VENIPUNCTURE: CPT | Mod: PO | Performed by: INTERNAL MEDICINE

## 2023-12-09 PROCEDURE — 84443 ASSAY THYROID STIM HORMONE: CPT | Performed by: STUDENT IN AN ORGANIZED HEALTH CARE EDUCATION/TRAINING PROGRAM

## 2023-12-09 PROCEDURE — 85025 COMPLETE CBC W/AUTO DIFF WBC: CPT | Performed by: STUDENT IN AN ORGANIZED HEALTH CARE EDUCATION/TRAINING PROGRAM

## 2023-12-14 ENCOUNTER — TELEPHONE (OUTPATIENT)
Dept: FAMILY MEDICINE | Facility: CLINIC | Age: 28
End: 2023-12-14
Payer: COMMERCIAL

## 2023-12-14 NOTE — TELEPHONE ENCOUNTER
Called pt and scheduled virtual to talk about lab results and pt also wanted to to cancel his next appt because he has another appt with another doctor that day. Appt cancelled         ----- Message from Kylah Zamudio MD sent at 12/12/2023 10:40 PM CST -----  Please call the patient and have them schedule an appointment at their earliest convenience to discuss their lab results (virtual okay). Thanks!

## 2023-12-18 ENCOUNTER — OFFICE VISIT (OUTPATIENT)
Dept: FAMILY MEDICINE | Facility: CLINIC | Age: 28
End: 2023-12-18
Payer: COMMERCIAL

## 2023-12-18 DIAGNOSIS — E78.2 MIXED HYPERLIPIDEMIA: ICD-10-CM

## 2023-12-18 DIAGNOSIS — E11.65 TYPE 2 DIABETES MELLITUS WITH HYPERGLYCEMIA, WITHOUT LONG-TERM CURRENT USE OF INSULIN: Primary | Chronic | ICD-10-CM

## 2023-12-18 PROCEDURE — 1159F MED LIST DOCD IN RCRD: CPT | Mod: CPTII,95,, | Performed by: STUDENT IN AN ORGANIZED HEALTH CARE EDUCATION/TRAINING PROGRAM

## 2023-12-18 PROCEDURE — 3072F LOW RISK FOR RETINOPATHY: CPT | Mod: CPTII,95,, | Performed by: STUDENT IN AN ORGANIZED HEALTH CARE EDUCATION/TRAINING PROGRAM

## 2023-12-18 PROCEDURE — 3046F PR MOST RECENT HEMOGLOBIN A1C LEVEL > 9.0%: ICD-10-PCS | Mod: CPTII,95,, | Performed by: STUDENT IN AN ORGANIZED HEALTH CARE EDUCATION/TRAINING PROGRAM

## 2023-12-18 PROCEDURE — 3066F NEPHROPATHY DOC TX: CPT | Mod: CPTII,95,, | Performed by: STUDENT IN AN ORGANIZED HEALTH CARE EDUCATION/TRAINING PROGRAM

## 2023-12-18 PROCEDURE — 99214 PR OFFICE/OUTPT VISIT, EST, LEVL IV, 30-39 MIN: ICD-10-PCS | Mod: 95,,, | Performed by: STUDENT IN AN ORGANIZED HEALTH CARE EDUCATION/TRAINING PROGRAM

## 2023-12-18 PROCEDURE — 3060F PR POS MICROALBUMINURIA RESULT DOCUMENTED/REVIEW: ICD-10-PCS | Mod: CPTII,95,, | Performed by: STUDENT IN AN ORGANIZED HEALTH CARE EDUCATION/TRAINING PROGRAM

## 2023-12-18 PROCEDURE — 99214 OFFICE O/P EST MOD 30 MIN: CPT | Mod: 95,,, | Performed by: STUDENT IN AN ORGANIZED HEALTH CARE EDUCATION/TRAINING PROGRAM

## 2023-12-18 PROCEDURE — 1160F RVW MEDS BY RX/DR IN RCRD: CPT | Mod: CPTII,95,, | Performed by: STUDENT IN AN ORGANIZED HEALTH CARE EDUCATION/TRAINING PROGRAM

## 2023-12-18 PROCEDURE — 3072F PR LOW RISK FOR RETINOPATHY: ICD-10-PCS | Mod: CPTII,95,, | Performed by: STUDENT IN AN ORGANIZED HEALTH CARE EDUCATION/TRAINING PROGRAM

## 2023-12-18 PROCEDURE — 3066F PR DOCUMENTATION OF TREATMENT FOR NEPHROPATHY: ICD-10-PCS | Mod: CPTII,95,, | Performed by: STUDENT IN AN ORGANIZED HEALTH CARE EDUCATION/TRAINING PROGRAM

## 2023-12-18 PROCEDURE — 3046F HEMOGLOBIN A1C LEVEL >9.0%: CPT | Mod: CPTII,95,, | Performed by: STUDENT IN AN ORGANIZED HEALTH CARE EDUCATION/TRAINING PROGRAM

## 2023-12-18 PROCEDURE — 1159F PR MEDICATION LIST DOCUMENTED IN MEDICAL RECORD: ICD-10-PCS | Mod: CPTII,95,, | Performed by: STUDENT IN AN ORGANIZED HEALTH CARE EDUCATION/TRAINING PROGRAM

## 2023-12-18 PROCEDURE — 1160F PR REVIEW ALL MEDS BY PRESCRIBER/CLIN PHARMACIST DOCUMENTED: ICD-10-PCS | Mod: CPTII,95,, | Performed by: STUDENT IN AN ORGANIZED HEALTH CARE EDUCATION/TRAINING PROGRAM

## 2023-12-18 PROCEDURE — 3060F POS MICROALBUMINURIA REV: CPT | Mod: CPTII,95,, | Performed by: STUDENT IN AN ORGANIZED HEALTH CARE EDUCATION/TRAINING PROGRAM

## 2023-12-18 RX ORDER — INSULIN DETEMIR 100 [IU]/ML
INJECTION, SOLUTION SUBCUTANEOUS
Qty: 6 ML | Refills: 1 | Status: SHIPPED | OUTPATIENT
Start: 2023-12-18 | End: 2024-02-28 | Stop reason: SDUPTHER

## 2023-12-18 RX ORDER — ATORVASTATIN CALCIUM 40 MG/1
40 TABLET, FILM COATED ORAL DAILY
Qty: 90 TABLET | Refills: 3 | Status: SHIPPED | OUTPATIENT
Start: 2023-12-18 | End: 2024-12-17

## 2023-12-18 NOTE — PROGRESS NOTES
Assessment and Plan:    Diagnoses and all orders for this visit:    Type 2 diabetes mellitus with hyperglycemia, without long-term current use of insulin  -     atorvastatin (LIPITOR) 40 MG tablet; Take 1 tablet (40 mg total) by mouth once daily.  -     insulin detemir U-100, Levemir, (LEVEMIR FLEXPEN) 100 unit/mL (3 mL) InPn pen; Inject 15 units subQ every morning.  -     Comprehensive Metabolic Panel; Future  -     LIPID PANEL; Future    Mixed hyperlipidemia  -     atorvastatin (LIPITOR) 40 MG tablet; Take 1 tablet (40 mg total) by mouth once daily.  -     LIPID PANEL; Future      Follow up in about 4 weeks (around 1/15/2024), or if symptoms worsen or fail to improve.    Kylah Zamudio MD  12/18/2023     Audiovisual Telehealth Visit:     The patient location is: Home  The chief complaint leading to consultation is: (documented below in HPI)  Visit type: Virtual visit with audiovisual  Total time spent on this encounter: 20 minutes.This includes face to face time and non-face to face time preparing to see the patient (eg, review of tests), obtaining and/or reviewing separately obtained history, documenting clinical information in the electronic or other health record, independently interpreting results, and communicating results to the patient/family/caregiver, or care coordinator.       Each patient to whom I provide medical services by telemedicine is: (1) informed of the relationship between the physician and patient and the respective role of any other health care provider with respect to management of the patient; and (2) notified that they may decline to receive medical services by telemedicine and may withdraw from such care at any time. Patient verbally consented to receive this service via audiovisual call.    Patient ID: Roberto Carlos Souza is a 28 y.o. male     HPI: 28 y.o. male with a PMHx as documented below presents to clinic today (via audiovisual telehealth visit) for the following:    Discussed most  recent lab results including further elevation of A1c and persistent hyperlipidemia with high LDL.  Difficulty with medication compliance, partially due to high cost of medication.  Additionally, patient had difficulty establishing medication regimen with previous PCP due to side effects with many of his medications.  Patient reports having an upcoming appointment with endocrinology for further discussion regarding his diabetes management.    Past Medical History:   Diagnosis Date    Anxiety     Diabetes mellitus, type 2 2015    Dyslipidemia     Hypertension     Previously on lisinopril    Obesity      Review of Systems   HENT:  Negative for hearing loss.    Eyes:  Negative for discharge.   Respiratory:  Negative for wheezing.    Cardiovascular:  Negative for chest pain and palpitations.   Gastrointestinal:  Negative for blood in stool, constipation, diarrhea and vomiting.   Genitourinary:  Negative for hematuria and urgency.   Musculoskeletal:  Negative for neck pain.   Neurological:  Negative for weakness and headaches.   Endo/Heme/Allergies:  Negative for polydipsia.     Answers submitted by the patient for this visit:  Review of Systems Questionnaire (Submitted on 12/18/2023)  activity change: No  unexpected weight change: No  rhinorrhea: No  trouble swallowing: No  visual disturbance: No  chest tightness: No  polyuria: No  difficulty urinating: No  joint swelling: No  arthralgias: No  confusion: No  dysphoric mood: No      Physical Exam:  Constitutional:       General: No acute distress.  HENT:      Head: Normocephalic and atraumatic.   Pulmonary:      Effort: Pulmonary effort is normal. No respiratory distress.   Neurological:      General: No focal deficit present.      Mental Status: Alert and oriented to person, place, and time. Mental status is at baseline.     Assessment and Plan:   See above    Kylah Zamudio MD  [facility]  Office: [phone]   Fax: [phone]  12/18/2023       Disclaimer: This note was partly generated using dictation software which may occasionally result in transcription errors.

## 2023-12-21 ENCOUNTER — OFFICE VISIT (OUTPATIENT)
Dept: ENDOCRINOLOGY | Facility: CLINIC | Age: 28
End: 2023-12-21
Payer: COMMERCIAL

## 2023-12-21 VITALS
WEIGHT: 245.06 LBS | BODY MASS INDEX: 32.48 KG/M2 | HEIGHT: 73 IN | HEART RATE: 87 BPM | DIASTOLIC BLOOD PRESSURE: 100 MMHG | SYSTOLIC BLOOD PRESSURE: 130 MMHG

## 2023-12-21 DIAGNOSIS — R80.9 TYPE 2 DIABETES MELLITUS WITH MICROALBUMINURIA, WITHOUT LONG-TERM CURRENT USE OF INSULIN: Primary | ICD-10-CM

## 2023-12-21 DIAGNOSIS — E11.29 TYPE 2 DIABETES MELLITUS WITH MICROALBUMINURIA, WITHOUT LONG-TERM CURRENT USE OF INSULIN: Primary | ICD-10-CM

## 2023-12-21 DIAGNOSIS — R03.0 ELEVATED BP WITHOUT DIAGNOSIS OF HYPERTENSION: ICD-10-CM

## 2023-12-21 DIAGNOSIS — E66.9 OBESITY (BMI 30-39.9): ICD-10-CM

## 2023-12-21 DIAGNOSIS — E78.5 DYSLIPIDEMIA: Chronic | ICD-10-CM

## 2023-12-21 DIAGNOSIS — E11.65 TYPE 2 DIABETES MELLITUS WITH HYPERGLYCEMIA, WITHOUT LONG-TERM CURRENT USE OF INSULIN: ICD-10-CM

## 2023-12-21 PROCEDURE — 3072F LOW RISK FOR RETINOPATHY: CPT | Mod: CPTII,S$GLB,, | Performed by: NURSE PRACTITIONER

## 2023-12-21 PROCEDURE — 99999 PR PBB SHADOW E&M-EST. PATIENT-LVL IV: ICD-10-PCS | Mod: PBBFAC,,, | Performed by: NURSE PRACTITIONER

## 2023-12-21 PROCEDURE — 1159F PR MEDICATION LIST DOCUMENTED IN MEDICAL RECORD: ICD-10-PCS | Mod: CPTII,S$GLB,, | Performed by: NURSE PRACTITIONER

## 2023-12-21 PROCEDURE — 1160F PR REVIEW ALL MEDS BY PRESCRIBER/CLIN PHARMACIST DOCUMENTED: ICD-10-PCS | Mod: CPTII,S$GLB,, | Performed by: NURSE PRACTITIONER

## 2023-12-21 PROCEDURE — 3066F PR DOCUMENTATION OF TREATMENT FOR NEPHROPATHY: ICD-10-PCS | Mod: CPTII,S$GLB,, | Performed by: NURSE PRACTITIONER

## 2023-12-21 PROCEDURE — 3080F PR MOST RECENT DIASTOLIC BLOOD PRESSURE >= 90 MM HG: ICD-10-PCS | Mod: CPTII,S$GLB,, | Performed by: NURSE PRACTITIONER

## 2023-12-21 PROCEDURE — 99999 PR PBB SHADOW E&M-EST. PATIENT-LVL IV: CPT | Mod: PBBFAC,,, | Performed by: NURSE PRACTITIONER

## 2023-12-21 PROCEDURE — 3080F DIAST BP >= 90 MM HG: CPT | Mod: CPTII,S$GLB,, | Performed by: NURSE PRACTITIONER

## 2023-12-21 PROCEDURE — 3072F PR LOW RISK FOR RETINOPATHY: ICD-10-PCS | Mod: CPTII,S$GLB,, | Performed by: NURSE PRACTITIONER

## 2023-12-21 PROCEDURE — 99204 OFFICE O/P NEW MOD 45 MIN: CPT | Mod: S$GLB,,, | Performed by: NURSE PRACTITIONER

## 2023-12-21 PROCEDURE — 3075F PR MOST RECENT SYSTOLIC BLOOD PRESS GE 130-139MM HG: ICD-10-PCS | Mod: CPTII,S$GLB,, | Performed by: NURSE PRACTITIONER

## 2023-12-21 PROCEDURE — 3008F BODY MASS INDEX DOCD: CPT | Mod: CPTII,S$GLB,, | Performed by: NURSE PRACTITIONER

## 2023-12-21 PROCEDURE — 3060F POS MICROALBUMINURIA REV: CPT | Mod: CPTII,S$GLB,, | Performed by: NURSE PRACTITIONER

## 2023-12-21 PROCEDURE — 3066F NEPHROPATHY DOC TX: CPT | Mod: CPTII,S$GLB,, | Performed by: NURSE PRACTITIONER

## 2023-12-21 PROCEDURE — 1160F RVW MEDS BY RX/DR IN RCRD: CPT | Mod: CPTII,S$GLB,, | Performed by: NURSE PRACTITIONER

## 2023-12-21 PROCEDURE — 1159F MED LIST DOCD IN RCRD: CPT | Mod: CPTII,S$GLB,, | Performed by: NURSE PRACTITIONER

## 2023-12-21 PROCEDURE — 3046F HEMOGLOBIN A1C LEVEL >9.0%: CPT | Mod: CPTII,S$GLB,, | Performed by: NURSE PRACTITIONER

## 2023-12-21 PROCEDURE — 3060F PR POS MICROALBUMINURIA RESULT DOCUMENTED/REVIEW: ICD-10-PCS | Mod: CPTII,S$GLB,, | Performed by: NURSE PRACTITIONER

## 2023-12-21 PROCEDURE — 3075F SYST BP GE 130 - 139MM HG: CPT | Mod: CPTII,S$GLB,, | Performed by: NURSE PRACTITIONER

## 2023-12-21 PROCEDURE — 3008F PR BODY MASS INDEX (BMI) DOCUMENTED: ICD-10-PCS | Mod: CPTII,S$GLB,, | Performed by: NURSE PRACTITIONER

## 2023-12-21 PROCEDURE — 99204 PR OFFICE/OUTPT VISIT, NEW, LEVL IV, 45-59 MIN: ICD-10-PCS | Mod: S$GLB,,, | Performed by: NURSE PRACTITIONER

## 2023-12-21 PROCEDURE — 3046F PR MOST RECENT HEMOGLOBIN A1C LEVEL > 9.0%: ICD-10-PCS | Mod: CPTII,S$GLB,, | Performed by: NURSE PRACTITIONER

## 2023-12-21 RX ORDER — BLOOD-GLUCOSE SENSOR
1 EACH MISCELLANEOUS
Qty: 3 EACH | Refills: 6 | Status: SHIPPED | OUTPATIENT
Start: 2023-12-21 | End: 2024-12-20

## 2023-12-21 RX ORDER — SEMAGLUTIDE 0.68 MG/ML
INJECTION, SOLUTION SUBCUTANEOUS
Qty: 3 ML | Refills: 6 | Status: SHIPPED | OUTPATIENT
Start: 2023-12-21 | End: 2024-01-30 | Stop reason: SDUPTHER

## 2023-12-21 NOTE — PROGRESS NOTES
CC: Mr. Roberto Carlos Souza arrives today for management of Type 2 DM and review of chronic medical conditions, as listed in the Visit Diagnosis section of this encounter.       HPI: Mr. Roberto Carlos Souza was diagnosed with Type 2 DM in at least . He was diagnosed based on lab work. Initial treatment consisted of orals. Insulin was added in 2023. + FH of DM in both parents. Denies hospitalizations due to DM.     This is his first time being seen in endocrine.     He states that his Jardiance rx ran out awhile ago. Needs refill of this.     His PCP just increased Levemir dose this week. Pt hasn't checked BG since making this change.     BG readings are checked 2x/day. No logs to clinic. Reports the following:  Fastin-250  Lunch: 200    Hypoglycemia: No    Missing Insulin/PO medication doses: No    Exercise: No    Dietary Habits:  Eats 3 meals/day. Occasional snack - granola, chips. Avoids sugary beverages.    Last DM education appointment:     His PCP recently prescribed atorvastatin. He hasn't started yet but plans to .       CURRENT DIABETIC MEDS:  glimepiride 2 mg BID, Levemir 15 units QAM  Vial or pen: pen  Glucometer type:     Previous DM treatments:  Metformin XR - diarrhea   Mounjaro - abdominal pain  Jardiance - ran out of refills   Trulicity - unsure why stopped  Rybelsus - abdominal pain    Last Eye Exam: 2022, no DR  Last Podiatry Exam:     REVIEW OF SYSTEMS  Constitutional: no c/o weakness or weight loss. + fatigue  Eyes: denies visual disturbances.  Cardiac: no palpitations or chest pain.  Respiratory: no dyspnea. + productive cough. Believes he caught virus his child brought home from . Denies fever.   GI: no c/o abdominal pain or nausea. Denies h/o pancreatitis. + intermittent diarrhea.   Skin: no lesions or rashes.  Neuro: + numbness, tingling in L feet (has herniated disc)  Endocrine: denies polyphagia, polyuria. + polydipsia      Personally reviewed Past Medical, Surgical,  "Social History.    Vital Signs  BP (!) 140/90   Pulse 87   Ht 6' 1" (1.854 m)   Wt 111.1 kg (245 lb 0.7 oz)   BMI 32.33 kg/m²     Personally reviewed the below labs:    Hemoglobin A1C   Date Value Ref Range Status   12/09/2023 10.9 (H) 4.0 - 5.6 % Final     Comment:     ADA Screening Guidelines:  5.7-6.4%  Consistent with prediabetes  >or=6.5%  Consistent with diabetes    High levels of fetal hemoglobin interfere with the HbA1C  assay. Heterozygous hemoglobin variants (HbS, HgC, etc)do  not significantly interfere with this assay.   However, presence of multiple variants may affect accuracy.     07/28/2023 10.2 (H) 4.0 - 5.6 % Final     Comment:     ADA Screening Guidelines:  5.7-6.4%  Consistent with prediabetes  >or=6.5%  Consistent with diabetes    High levels of fetal hemoglobin interfere with the HbA1C  assay. Heterozygous hemoglobin variants (HbS, HgC, etc)do  not significantly interfere with this assay.   However, presence of multiple variants may affect accuracy.     06/06/2023 9.1 (H) 4.0 - 5.6 % Final     Comment:     ADA Screening Guidelines:  5.7-6.4%  Consistent with prediabetes  >or=6.5%  Consistent with diabetes    High levels of fetal hemoglobin interfere with the HbA1C  assay. Heterozygous hemoglobin variants (HbS, HgC, etc)do  not significantly interfere with this assay.   However, presence of multiple variants may affect accuracy.     06/06/2023 9.1 (H) 4.0 - 5.6 % Final     Comment:     ADA Screening Guidelines:  5.7-6.4%  Consistent with prediabetes  >or=6.5%  Consistent with diabetes    High levels of fetal hemoglobin interfere with the HbA1C  assay. Heterozygous hemoglobin variants (HbS, HgC, etc)do  not significantly interfere with this assay.   However, presence of multiple variants may affect accuracy.         Chemistry        Component Value Date/Time     12/09/2023 1020     12/09/2023 1020    K 4.1 12/09/2023 1020    K 4.1 12/09/2023 1020     12/09/2023 1020    CL " "104 12/09/2023 1020    CO2 21 (L) 12/09/2023 1020    CO2 21 (L) 12/09/2023 1020    BUN 11 12/09/2023 1020    BUN 11 12/09/2023 1020    CREATININE 0.8 12/09/2023 1020    CREATININE 0.8 12/09/2023 1020     (H) 12/09/2023 1020     (H) 12/09/2023 1020        Component Value Date/Time    CALCIUM 9.5 12/09/2023 1020    CALCIUM 9.5 12/09/2023 1020    ALKPHOS 80 12/09/2023 1020    ALKPHOS 80 12/09/2023 1020    AST 25 12/09/2023 1020    AST 25 12/09/2023 1020    ALT 46 (H) 12/09/2023 1020    ALT 46 (H) 12/09/2023 1020    BILITOT 1.1 (H) 12/09/2023 1020    BILITOT 1.1 (H) 12/09/2023 1020    ESTGFRAFRICA >60.0 04/20/2022 0933    EGFRNONAA >60.0 04/20/2022 0933          Lab Results   Component Value Date    CHOL 252 (H) 12/09/2023    CHOL 220 (H) 07/28/2023    CHOL 229 (H) 06/06/2023     Lab Results   Component Value Date    HDL 34 (L) 12/09/2023    HDL 34 (L) 07/28/2023    HDL 32 (L) 06/06/2023     Lab Results   Component Value Date    LDLCALC 169.4 (H) 12/09/2023    LDLCALC 116.2 07/28/2023    LDLCALC 133.2 06/06/2023     Lab Results   Component Value Date    TRIG 243 (H) 12/09/2023    TRIG 349 (H) 07/28/2023    TRIG 319 (H) 06/06/2023     Lab Results   Component Value Date    CHOLHDL 13.5 (L) 12/09/2023    CHOLHDL 15.5 (L) 07/28/2023    CHOLHDL 14.0 (L) 06/06/2023       Lab Results   Component Value Date    MICALBCREAT 89.6 (H) 02/04/2023     Lab Results   Component Value Date    TSH 0.856 12/09/2023       CrCl cannot be calculated (Patient's most recent lab result is older than the maximum 7 days allowed.).    No results found for: "EQYALLZC27OH"      PHYSICAL EXAMINATION  Constitutional: Appears well, no distress  Respiratory: CTA, even and unlabored.  Cardiovascular: RRR, no murmurs, no carotid bruits.  GI: bowel sounds active, no hernia noted  Skin: warm and dry; no visible wounds  Neuro: oriented to person, place, time  Feet: appropriate footwear.  Protective Sensation (w/ 10 gram monofilament):  Right: " Intact  Left: Intact    Visual Inspection:  Normal -  Bilateral    Pedal Pulses:   Right: Present  Left: Present    Posterior Tibialis Pulses:   Right:Present  Left: Present       Goals        HEMOGLOBIN A1C < 7               Assessment/Plan  1. Type 2 diabetes mellitus with microalbuminuria, without long-term current use of insulin  -- Chronically uncontrolled. Would ideally use GLP-1RA if he can tolerate. Discussed Ozempic and he would like to try. However, he is aware Rybelsus is the same medication, which he had GI side effects with. If he cannot tolerate Ozempic, will resume Trulicity. Couldn't tolerate Mounjaro.   -- Start Ozempic 0.25 mg every 7 days. After 4 weeks, increase dose to 0.5 mg every 7 days. Discussed medication's mechanism of action, side effects, and contraindications. Advised pt to notify me for extreme nausea, abdominal pain, etc.  -- resume Jardiance 10 mg daily. Will increase after 1 month. Encouraged increased hydration.   -- decrease glimepiride to 2 mg daily  -- continue Levemir at current dose for now (dose already increased this week). Explained we will have to change brands once he gets through supply since it's being discontinued.   -- order Dexcom G7. Sample provided.    2. Dyslipidemia  -- uncontrolled  -- advised him to start the atorvastatin that his PCP prescribed  -- lipid panel with RTC   3. Obesity (BMI 30-39.9)  -- increases insulin resistance  -- Body mass index is 32.33 kg/m².   4. Elevated BP without diagnosis of hypertension  -- possibly elevated due to not feeling well, 2/2 URI  -- Jardiance may add benefit  -- continue to monitor       FOLLOW UP  Follow up in about 3 months (around 3/21/2024).   Patient instructed to bring BG logs to each follow up   Patient encouraged to call for any BG/medication issues, concerns, or questions.    Orders Placed This Encounter   Procedures    Hemoglobin A1C    Microalbumin/Creatinine Ratio, Urine

## 2023-12-21 NOTE — PATIENT INSTRUCTIONS
Start Ozempic 0.25 mg every 7 days. After 4 weeks, increase dose to 0.5 mg every 7 days. Notify me for extreme nausea, abdominal pain, etc.    Resume Jardiance 10 mg daily. Notify me when you are ready for refill and I will prescribe the 25 mg dose.     Decrease glimepiride to 2 mg in the morning only.     Notify me once at end of Levemir supply so we can change brands.

## 2023-12-29 DIAGNOSIS — F90.0 ADHD (ATTENTION DEFICIT HYPERACTIVITY DISORDER), INATTENTIVE TYPE: ICD-10-CM

## 2024-01-02 RX ORDER — DEXTROAMPHETAMINE SACCHARATE, AMPHETAMINE ASPARTATE MONOHYDRATE, DEXTROAMPHETAMINE SULFATE AND AMPHETAMINE SULFATE 7.5; 7.5; 7.5; 7.5 MG/1; MG/1; MG/1; MG/1
30 CAPSULE, EXTENDED RELEASE ORAL EVERY MORNING
Qty: 30 CAPSULE | Refills: 0 | Status: SHIPPED | OUTPATIENT
Start: 2024-01-02 | End: 2024-01-23

## 2024-01-04 ENCOUNTER — TELEPHONE (OUTPATIENT)
Dept: ENDOCRINOLOGY | Facility: CLINIC | Age: 29
End: 2024-01-04
Payer: COMMERCIAL

## 2024-01-12 ENCOUNTER — PATIENT MESSAGE (OUTPATIENT)
Dept: ENDOCRINOLOGY | Facility: CLINIC | Age: 29
End: 2024-01-12
Payer: COMMERCIAL

## 2024-01-12 DIAGNOSIS — E11.29 TYPE 2 DIABETES MELLITUS WITH MICROALBUMINURIA, WITHOUT LONG-TERM CURRENT USE OF INSULIN: Primary | ICD-10-CM

## 2024-01-12 DIAGNOSIS — R80.9 TYPE 2 DIABETES MELLITUS WITH MICROALBUMINURIA, WITHOUT LONG-TERM CURRENT USE OF INSULIN: Primary | ICD-10-CM

## 2024-01-15 DIAGNOSIS — F41.1 GAD (GENERALIZED ANXIETY DISORDER): ICD-10-CM

## 2024-01-15 DIAGNOSIS — F33.42 RECURRENT MAJOR DEPRESSIVE DISORDER, IN FULL REMISSION: ICD-10-CM

## 2024-01-15 DIAGNOSIS — F41.8 ANXIETY WITH DEPRESSION: ICD-10-CM

## 2024-01-16 RX ORDER — SERTRALINE HYDROCHLORIDE 100 MG/1
100 TABLET, FILM COATED ORAL
Qty: 90 TABLET | Refills: 1 | Status: SHIPPED | OUTPATIENT
Start: 2024-01-16

## 2024-01-16 NOTE — TELEPHONE ENCOUNTER
Last ordered: 2 months ago (11/14/2023) by Julia Campoverde NP     Last refill: 10/20/2023     Nov 1/22/24

## 2024-01-18 ENCOUNTER — TELEPHONE (OUTPATIENT)
Dept: PSYCHIATRY | Facility: CLINIC | Age: 29
End: 2024-01-18
Payer: COMMERCIAL

## 2024-01-23 ENCOUNTER — OFFICE VISIT (OUTPATIENT)
Dept: PSYCHIATRY | Facility: CLINIC | Age: 29
End: 2024-01-23
Payer: COMMERCIAL

## 2024-01-23 VITALS
DIASTOLIC BLOOD PRESSURE: 84 MMHG | HEIGHT: 73 IN | HEART RATE: 96 BPM | BODY MASS INDEX: 33.13 KG/M2 | SYSTOLIC BLOOD PRESSURE: 139 MMHG | WEIGHT: 250 LBS

## 2024-01-23 DIAGNOSIS — F33.42 RECURRENT MAJOR DEPRESSIVE DISORDER, IN FULL REMISSION: ICD-10-CM

## 2024-01-23 DIAGNOSIS — F41.1 GAD (GENERALIZED ANXIETY DISORDER): ICD-10-CM

## 2024-01-23 DIAGNOSIS — F90.0 ADHD (ATTENTION DEFICIT HYPERACTIVITY DISORDER), INATTENTIVE TYPE: ICD-10-CM

## 2024-01-23 PROCEDURE — 3075F SYST BP GE 130 - 139MM HG: CPT | Mod: CPTII,S$GLB,,

## 2024-01-23 PROCEDURE — 99999 PR PBB SHADOW E&M-EST. PATIENT-LVL III: CPT | Mod: PBBFAC,,,

## 2024-01-23 PROCEDURE — 99215 OFFICE O/P EST HI 40 MIN: CPT | Mod: S$GLB,,,

## 2024-01-23 PROCEDURE — 3079F DIAST BP 80-89 MM HG: CPT | Mod: CPTII,S$GLB,,

## 2024-01-23 PROCEDURE — 1159F MED LIST DOCD IN RCRD: CPT | Mod: CPTII,S$GLB,,

## 2024-01-23 PROCEDURE — 3008F BODY MASS INDEX DOCD: CPT | Mod: CPTII,S$GLB,,

## 2024-01-23 PROCEDURE — 1160F RVW MEDS BY RX/DR IN RCRD: CPT | Mod: CPTII,S$GLB,,

## 2024-01-23 RX ORDER — BUPROPION HYDROCHLORIDE 300 MG/1
300 TABLET ORAL DAILY
Qty: 90 TABLET | Refills: 0 | Status: SHIPPED | OUTPATIENT
Start: 2024-01-23 | End: 2024-03-05 | Stop reason: SDUPTHER

## 2024-01-23 RX ORDER — DEXTROAMPHETAMINE SACCHARATE, AMPHETAMINE ASPARTATE MONOHYDRATE, DEXTROAMPHETAMINE SULFATE AND AMPHETAMINE SULFATE 5; 5; 5; 5 MG/1; MG/1; MG/1; MG/1
40 CAPSULE, EXTENDED RELEASE ORAL EVERY MORNING
Qty: 60 CAPSULE | Refills: 0 | Status: SHIPPED | OUTPATIENT
Start: 2024-01-23 | End: 2024-03-13 | Stop reason: SDUPTHER

## 2024-01-23 NOTE — PROGRESS NOTES
"OUTPATIENT PSYCHIATRY FOLLOW UP VISIT    Encounter Date: 1/23/2024    Clinical Status of Patient:  Outpatient (Virtual)  The patient location is: [address] [place] [address]  The patient phone number is: [phone]   Visit type: Virtual visit with synchronous audio and video  Each patient to whom he or she provides medical services by telemedicine is:  (1) informed of the relationship between the practitioner and patient and the respective role of any other health care provider with respect to management of the patient; and (2) notified that he or she may decline to receive medical services by telemedicine and may withdraw from such care at any time.    Chief Complaint:  Roberto Carlos Souza is a 28 y.o. male who presents today for follow-up.  Met with patient.      HISTORY OF PRESENTING ILLNESS:  Roberto Carlos Souza is a 28 y.o. male with history of MDD, MARCOS, and ADHD-IT who presents for follow up appointment.      11/14/2023: Patient reports no change in symptoms of ADHD after starting dextroamphetamine-amphetamine XR 20 mg q.a.m.  He states his wife also confirms she has not seen improvement in the patient's symptoms either  Mood continues to be stable   Anxiety is well controlled.  Baby is 3 months, is currently ill, this is his 1st illness episode. Was sleeping 6-7 hours prior to illness.     Plan at last appointment:   Continue sertraline 150 mg daily for mood and anxiety  Continue bupropion  mg q.a.m. for mood   Continue buspirone 5-7.5 mg p.o. t.i.d. p.r.n. anxiety  INCREASE dextroamphetamine-amphetamine XR from 20 to 30 mg q.a.m. for ADHD  Offered referral for individual psychotherapy      Psychotropic medication history:   Lexapro      INTERVAL HISTORY:    Dextroamphetamine-amphetamine was increased from 20 to 30 mg q.a.m. at last visit 2 months ago. Pt reports no real improvement in attention and concentration. "I feel a little better but I just space out sometimes." Denies adverse effects with " "increase in dose. Denies restlessness or fidgeting, but states he is "unable to concentrate all over the place." Requests to increase dose.  Pt denies cardiovascular events including increased heart rate or increased blood pressure, palpitations, chest pain, dizziness, lightheadedness, or syncopal episodes. Pt denies A/V hallucinations or behavioral effects. Pt denies anorexia, decreased appetite, xerostomia, headache, insomnia, or digital changes.     Mood continues to be stable.   Anxiety continues to be well controlled.    Started Ozempic approx 1 month ago.    No interval episodes with symptoms consistent with benny or hypomania.  Denied interval or current suicidal/homicidal thoughts, intent, or plan or NSSI.  Denied other questions and concerns.    Medication side effects: None  Medication adherence: yes    PSYCHIATRIC REVIEW OF SYSTEMS:  Is patient experiencing or having changes in:  Trouble with sleep:  no  Appetite changes:  no  Weight changes:  no  Lack of energy:  no  Anhedonia: no  Somatic symptoms:  no  Anxiety/panic: improving  Irritability: improving  Guilty/hopeless:  no  Concentration:  difficulties with concentration at baseline, only minor improvement with dextroamphetamine-amphetamine   Racing thoughts: no  Impulsive behaviors: no  Paranoia/AVH: no  Self-injurious behavior/risky behavior:  no  Any drugs:  no  Alcohol:  ~1 / month, ~2 beers per occasion    MEDICAL REVIEW OF SYSTEMS:   Pain: Denies any significant chronic or acute pain.  Constitutional: Denies fever or change in appetite.  Cardiovascular: Denies chest pain or exertional dyspnea.  Respiratory: Artemio cough or orthopnea.   GI: Denies abdominal pain, N/V  Neurological: Denies tremor, seizure, or focal weakness.  Psychiatric: See HPI above.    PAST PSYCHIATRIC, MEDICAL, AND SOCIAL HISTORY REVIEWED  The patient's past medical, family and social history have been reviewed and updated as appropriate within the electronic medical record - " see encounter notes.    PAST MEDICAL HISTORY:   Past Medical History:   Diagnosis Date    Anxiety     Diabetes mellitus, type 2 2015    Dyslipidemia     Hypertension     Previously on lisinopril    Obesity      Head trauma/Loss of consciousness: denies  Seizures: denies     PAST PSYCHIATRIC HISTORY:  First psych contact: at 20 for depression and anxiety    Prior hospitalizations: denies  Prior suicide attempts or self-harm: denies  Prior diagnosis: MDD, MARCOS  Prior psychotherapy: individual psychotherapy @ [age], recently with Dr. Nettles     MEDICATIONS:    Current Outpatient Medications:     atorvastatin (LIPITOR) 40 MG tablet, Take 1 tablet (40 mg total) by mouth once daily., Disp: 90 tablet, Rfl: 3    blood-glucose sensor (DEXCOM G7 SENSOR) Cookie, 1 Device by Misc.(Non-Drug; Combo Route) route every 10 days., Disp: 3 each, Rfl: 6    empagliflozin (JARDIANCE) 25 mg tablet, Take 1 tablet (25 mg total) by mouth once daily., Disp: 30 tablet, Rfl: 6    glimepiride (AMARYL) 2 MG tablet, TAKE 1 TABLET BY MOUTH TWICE DAILY WITH BREAKFAST AND DINNER, Disp: 180 tablet, Rfl: 1    insulin detemir U-100, Levemir, (LEVEMIR FLEXPEN) 100 unit/mL (3 mL) InPn pen, Inject 15 units subQ every morning., Disp: 6 mL, Rfl: 1    semaglutide (OZEMPIC) 0.25 mg or 0.5 mg (2 mg/3 mL) pen injector, Inject 0.25 mg into the skin every 7 days for 4 weeks. Then increase to 0.5 mg every 7 days on week 5 and continue on this dose., Disp: 3 mL, Rfl: 6    sertraline (ZOLOFT) 100 MG tablet, TAKE 1 TABLET BY MOUTH EVERY DAY, Disp: 90 tablet, Rfl: 1    buPROPion (WELLBUTRIN XL) 300 MG 24 hr tablet, Take 1 tablet (300 mg total) by mouth once daily., Disp: 90 tablet, Rfl: 0    dextroamphetamine-amphetamine (ADDERALL XR) 20 MG 24 hr capsule, Take 2 capsules (40 mg total) by mouth every morning., Disp: 60 capsule, Rfl: 0    ALLERGIES:  Review of patient's allergies indicates:   Allergen Reactions    Mounjaro [tirzepatide] Other (See Comments)     Patient  "developed with using Mounjaro, indigestion, belching, abdominal pain, diarrhea, upset stomach and nausea.  Was advised to discontinue the medication; please see e-mail addressing the issue..  Above GI complaints were addressed.  Pain was rated about a 6; he was advised to go to the emergency room should it worsen    Metformin      Discontinued with hematochezia; symptoms markedly improved but still persistent; remain off metformin    Rybelsus [semaglutide] Other (See Comments)     abdominal pain, nausea       EXAM:  Constitutional  Vitals:  Most recent vital signs were reviewed.   Last 3 sets of VS:      7/25/2023    12:15 PM 12/21/2023     8:03 AM 1/23/2024     1:52 PM   Vitals - 1 value per visit   SYSTOLIC 136 130 139   DIASTOLIC 88 100 84   Pulse 71 87 96   Temp 98.8 °F (37.1 °C)     Resp 16     SPO2 96 %     Weight (lb) 254.41 245.04 250   Weight (kg) 115.4 111.15 113.4   Height 6' 1" (1.854 m) 6' 1" (1.854 m) 6' 1" (1.854 m)   BMI (Calculated) 33.6 32.3 33   Pain Score Zero Zero Zero      General:  unremarkable, age appropriate     Musculoskeletal  Muscle Strength/Tone:  No tremors appreciated   Gait & Station:  Unable to assess, Pt seated during virtual visit     Psychiatric  Speech:  no latency; no press   Mood & Affect:  euthymic  congruent and appropriate   Thought Process:  normal and logical   Associations:  intact   Thought Content:  normal, no suicidality, no homicidality, delusions, or paranoia   Insight:  intact   Judgement: behavior is adequate to circumstances   Orientation:  grossly intact   Memory: intact for content of interview   Language: grossly intact   Attention Span & Concentration:  able to focus   Fund of Knowledge:  intact and appropriate to age and level of education     SUICIDE RISK ASSESSMENT:  Protective factors: age, gender, no prior attempts, no prior hospitalizations, no ongoing substance abuse, no psychosis, , wife is currently pregnant with their 1st child, denies " SI/intent/plan, seeking treatment, access to treatment, future oriented, good primary support, no access to firearms  Risks: ongoing anxiety and depression  Patient is a low immediate and long-term risk considering risk factors    RELEVANT LABS/STUDIES:    Lab Results   Component Value Date    WBC 7.59 12/09/2023    HGB 17.0 12/09/2023    HCT 47.9 12/09/2023    MCV 82 12/09/2023     12/09/2023     BMP  Lab Results   Component Value Date     12/09/2023     12/09/2023    K 4.1 12/09/2023    K 4.1 12/09/2023     12/09/2023     12/09/2023    CO2 21 (L) 12/09/2023    CO2 21 (L) 12/09/2023    BUN 11 12/09/2023    BUN 11 12/09/2023    CREATININE 0.8 12/09/2023    CREATININE 0.8 12/09/2023    CALCIUM 9.5 12/09/2023    CALCIUM 9.5 12/09/2023    ANIONGAP 11 12/09/2023    ANIONGAP 11 12/09/2023    ESTGFRAFRICA >60.0 04/20/2022    EGFRNONAA >60.0 04/20/2022     Lab Results   Component Value Date    ALT 46 (H) 12/09/2023    ALT 46 (H) 12/09/2023    AST 25 12/09/2023    AST 25 12/09/2023    ALKPHOS 80 12/09/2023    ALKPHOS 80 12/09/2023    BILITOT 1.1 (H) 12/09/2023    BILITOT 1.1 (H) 12/09/2023     Lab Results   Component Value Date    TSH 0.856 12/09/2023     Lab Results   Component Value Date    HGBA1C 10.9 (H) 12/09/2023       IMPRESSION:    Roberto Carlos Souza is a 28 y.o. male with history of MDD and MARCOS who presents for follow up appointment.    Status/Progress: Based on the examination today, the patient's problem(s) is/are adequately but not ideally controlled.  New problems have not been presented today.   Co-morbidities are not complicating management of the primary condition.  There are no active rule-out diagnoses for this patient at this time.     Patient reports no improvement in symptoms of ADHD after starting dextroamphetamine-amphetamine XR 20 mg q.a.m. at last visit.  Through shared decision making, dextroamphetamine amphetamine XR will be increased to 30 mg q.a.m. with follow up in 1  month.    Reports minor improvement in symptoms with increase to 30 mg daily, but reports significant impairment from remaining symptoms and requests to increase dose.     Risk Parameters:  Patient reports no suicidal ideation  Patient reports no homicidal ideation  Patient reports no self-injurious behavior  Patient reports no violent behavior    DIAGNOSES:    ICD-10-CM ICD-9-CM   1. ADHD (attention deficit hyperactivity disorder), inattentive type  F90.0 314.00   2. Recurrent major depressive disorder, in full remission  F33.42 296.36   3. MARCOS (generalized anxiety disorder)  F41.1 300.02       PLAN:  Continue sertraline 150 mg daily for mood and anxiety  Continue bupropion  mg q.a.m. for mood   Continue buspirone 5-7.5 mg p.o. t.i.d. p.r.n. anxiety  INCREASE dextroamphetamine-amphetamine XR from 30 to 40 mg q.a.m. for ADHD  Offered referral for individual psychotherapy      RETURN TO CLINIC:   1 month      ESTELLA Becerril, PMHNP-BC      42 minutes of total time spent on the encounter, which includes face to face time and non-face to face time preparing to see the patient (eg. review of tests), obtaining and/or reviewing separately obtained history, documenting clinical information in the electronic health record, independently interpreting results (not separately reported), and communicating results to the patient/family/caregiver, or care coordination (not separately reported).     At this time there are no indications the patient represents an imminent danger to either themselves or others; will continue to manage treatment in the outpatient setting.    I discussed the patient's care with the patient including benefits, alternatives, possible adverse effects of the treatment plan; including the potential for metabolic complications, major organ dysfunction, black box warnings, and contraindications. The opportunity was given for questions/clarification, and after this discussion the above treatment  "plan was devised through shared decision making. The patient voiced their understanding of the diagnoses and treatments listed above and agreed to the treatment plan. Follow up plan was reviewed with the patient. The patient was advised to call to report any worsening of symptoms or problems with medication.    Supportive therapy and psychoeducation provided. Patient has been given crisis information including Suicide and Crisis Lifeline (call or text: 349). Patient also given instructions to go to the nearest ER or call 911 if unable to remain safe or if the Pt develops thoughts of harming self or others.    Documentation entered by me for this encounter may have been done in part using [employer] voice recognition transcription software. Garbled syntax, mangled pronouns, and other bizarre constructions may be attributed to that software system. Although I have made an effort to ensure accuracy, "sound like" errors may exist and should be interpreted in context.    "

## 2024-01-30 DIAGNOSIS — R80.9 TYPE 2 DIABETES MELLITUS WITH MICROALBUMINURIA, WITHOUT LONG-TERM CURRENT USE OF INSULIN: ICD-10-CM

## 2024-01-30 DIAGNOSIS — E11.29 TYPE 2 DIABETES MELLITUS WITH MICROALBUMINURIA, WITHOUT LONG-TERM CURRENT USE OF INSULIN: ICD-10-CM

## 2024-01-31 RX ORDER — SEMAGLUTIDE 0.68 MG/ML
0.5 INJECTION, SOLUTION SUBCUTANEOUS
Qty: 3 ML | Refills: 6 | Status: SHIPPED | OUTPATIENT
Start: 2024-01-31 | End: 2024-03-27

## 2024-02-28 ENCOUNTER — PATIENT MESSAGE (OUTPATIENT)
Dept: ENDOCRINOLOGY | Facility: CLINIC | Age: 29
End: 2024-02-28
Payer: COMMERCIAL

## 2024-02-28 DIAGNOSIS — E11.65 TYPE 2 DIABETES MELLITUS WITH HYPERGLYCEMIA, WITHOUT LONG-TERM CURRENT USE OF INSULIN: ICD-10-CM

## 2024-02-28 RX ORDER — INSULIN DEGLUDEC 100 U/ML
22 INJECTION, SOLUTION SUBCUTANEOUS DAILY
Qty: 6.6 ML | Refills: 6 | Status: SHIPPED | OUTPATIENT
Start: 2024-02-28 | End: 2024-03-27

## 2024-02-28 RX ORDER — GLIMEPIRIDE 4 MG/1
4 TABLET ORAL
Qty: 30 TABLET | Refills: 6 | Status: SHIPPED | OUTPATIENT
Start: 2024-02-28

## 2024-02-28 NOTE — TELEPHONE ENCOUNTER
Please see message and attached Dexcom data (pt attached data in message, not uploaded in media). I am also getting pt set up for sharing.

## 2024-03-05 ENCOUNTER — PATIENT MESSAGE (OUTPATIENT)
Dept: ENDOCRINOLOGY | Facility: CLINIC | Age: 29
End: 2024-03-05
Payer: COMMERCIAL

## 2024-03-05 DIAGNOSIS — F33.42 RECURRENT MAJOR DEPRESSIVE DISORDER, IN FULL REMISSION: ICD-10-CM

## 2024-03-05 DIAGNOSIS — F41.1 GAD (GENERALIZED ANXIETY DISORDER): ICD-10-CM

## 2024-03-06 RX ORDER — BUPROPION HYDROCHLORIDE 300 MG/1
300 TABLET ORAL DAILY
Qty: 90 TABLET | Refills: 0 | Status: SHIPPED | OUTPATIENT
Start: 2024-03-06 | End: 2024-06-11

## 2024-03-13 DIAGNOSIS — F90.0 ADHD (ATTENTION DEFICIT HYPERACTIVITY DISORDER), INATTENTIVE TYPE: ICD-10-CM

## 2024-03-14 RX ORDER — DEXTROAMPHETAMINE SACCHARATE, AMPHETAMINE ASPARTATE MONOHYDRATE, DEXTROAMPHETAMINE SULFATE AND AMPHETAMINE SULFATE 5; 5; 5; 5 MG/1; MG/1; MG/1; MG/1
40 CAPSULE, EXTENDED RELEASE ORAL EVERY MORNING
Qty: 60 CAPSULE | Refills: 0 | Status: SHIPPED | OUTPATIENT
Start: 2024-03-14 | End: 2024-04-13

## 2024-03-26 ENCOUNTER — PATIENT MESSAGE (OUTPATIENT)
Dept: ENDOCRINOLOGY | Facility: CLINIC | Age: 29
End: 2024-03-26
Payer: COMMERCIAL

## 2024-03-26 ENCOUNTER — LAB VISIT (OUTPATIENT)
Dept: LAB | Facility: HOSPITAL | Age: 29
End: 2024-03-26
Payer: COMMERCIAL

## 2024-03-26 DIAGNOSIS — E78.2 MIXED HYPERLIPIDEMIA: ICD-10-CM

## 2024-03-26 DIAGNOSIS — R80.9 TYPE 2 DIABETES MELLITUS WITH MICROALBUMINURIA, WITHOUT LONG-TERM CURRENT USE OF INSULIN: ICD-10-CM

## 2024-03-26 DIAGNOSIS — E11.29 TYPE 2 DIABETES MELLITUS WITH MICROALBUMINURIA, WITHOUT LONG-TERM CURRENT USE OF INSULIN: ICD-10-CM

## 2024-03-26 DIAGNOSIS — E11.65 TYPE 2 DIABETES MELLITUS WITH HYPERGLYCEMIA, WITHOUT LONG-TERM CURRENT USE OF INSULIN: Chronic | ICD-10-CM

## 2024-03-26 LAB
ALBUMIN SERPL BCP-MCNC: 4 G/DL (ref 3.5–5.2)
ALBUMIN/CREAT UR: 48.8 UG/MG (ref 0–30)
ALP SERPL-CCNC: 86 U/L (ref 55–135)
ALT SERPL W/O P-5'-P-CCNC: 44 U/L (ref 10–44)
ANION GAP SERPL CALC-SCNC: 10 MMOL/L (ref 8–16)
AST SERPL-CCNC: 22 U/L (ref 10–40)
BILIRUB SERPL-MCNC: 0.6 MG/DL (ref 0.1–1)
BUN SERPL-MCNC: 8 MG/DL (ref 6–20)
CALCIUM SERPL-MCNC: 9.3 MG/DL (ref 8.7–10.5)
CHLORIDE SERPL-SCNC: 106 MMOL/L (ref 95–110)
CHOLEST SERPL-MCNC: 151 MG/DL (ref 120–199)
CHOLEST/HDLC SERPL: 5 {RATIO} (ref 2–5)
CO2 SERPL-SCNC: 24 MMOL/L (ref 23–29)
CREAT SERPL-MCNC: 0.7 MG/DL (ref 0.5–1.4)
CREAT UR-MCNC: 82 MG/DL (ref 23–375)
EST. GFR  (NO RACE VARIABLE): >60 ML/MIN/1.73 M^2
ESTIMATED AVG GLUCOSE: 232 MG/DL (ref 68–131)
GLUCOSE SERPL-MCNC: 197 MG/DL (ref 70–110)
HBA1C MFR BLD: 9.7 % (ref 4–5.6)
HDLC SERPL-MCNC: 30 MG/DL (ref 40–75)
HDLC SERPL: 19.9 % (ref 20–50)
LDLC SERPL CALC-MCNC: 96.4 MG/DL (ref 63–159)
MICROALBUMIN UR DL<=1MG/L-MCNC: 40 UG/ML
NONHDLC SERPL-MCNC: 121 MG/DL
POTASSIUM SERPL-SCNC: 4 MMOL/L (ref 3.5–5.1)
PROT SERPL-MCNC: 6.9 G/DL (ref 6–8.4)
SODIUM SERPL-SCNC: 140 MMOL/L (ref 136–145)
TRIGL SERPL-MCNC: 123 MG/DL (ref 30–150)

## 2024-03-26 PROCEDURE — 80061 LIPID PANEL: CPT | Performed by: STUDENT IN AN ORGANIZED HEALTH CARE EDUCATION/TRAINING PROGRAM

## 2024-03-26 PROCEDURE — 83036 HEMOGLOBIN GLYCOSYLATED A1C: CPT | Performed by: NURSE PRACTITIONER

## 2024-03-26 PROCEDURE — 82043 UR ALBUMIN QUANTITATIVE: CPT | Performed by: NURSE PRACTITIONER

## 2024-03-26 PROCEDURE — 36415 COLL VENOUS BLD VENIPUNCTURE: CPT | Mod: PO | Performed by: STUDENT IN AN ORGANIZED HEALTH CARE EDUCATION/TRAINING PROGRAM

## 2024-03-26 PROCEDURE — 80053 COMPREHEN METABOLIC PANEL: CPT | Performed by: STUDENT IN AN ORGANIZED HEALTH CARE EDUCATION/TRAINING PROGRAM

## 2024-03-26 NOTE — PROGRESS NOTES
"The patient location is:  [place]   The chief complaint leading to consultation is: Type 2 DM  Visit type: Virtual visit with synchronous audio and video  Total time spent with patient: 17 min  Each patient to whom he or she provides medical services by telemedicine is:  (1) informed of the relationship between the physician and patient and the respective role of any other health care provider with respect to management of the patient; and (2) notified that he or she may decline to receive medical services by telemedicine and may withdraw from such care at any time.       CC: Mr. Roberto Carlos Souza arrives today for management of Type 2 DM and review of chronic medical conditions, as listed in the Visit Diagnosis section of this encounter.       HPI: Mr. Roberto Carlos Souza was diagnosed with Type 2 DM in at least 2017. He was diagnosed based on lab work. Initial treatment consisted of orals. Insulin was added in 7/2023. + FH of DM in both parents. Denies hospitalizations due to DM.     Patient was last seen by me in December. At that time, Ozempic was added, Jardiance resumed, and glimepiride reduced. Dexcom G7 was also ordered.     He is using rest of Levemir supply. [facility] Tresiba was expensive.     2 weeks ago, he was out of Jardiance for 1 week bc pharmacy was out of stock. [facility] blood sugars were much more elevated then. This is included on CGM report today.    BG monitoring per Dexcom G7.    Hypoglycemia: No    Missing Insulin/PO medication doses: No    Exercise: No    Dietary Habits:  Eats 2-3 meals/day. May skip lunch. Overall, less snacking but does feel "more ravenous" the first couple of days after taking Ozempic. Avoids sugary beverages.    Last DM education appointment: 2017    Regarding hyperlipidemia, he is tolerating atorvastatin.       CURRENT DIABETIC MEDS:  glimepiride 4 mg daily, Jardiance 25 mfg daily, Ozempic 0.5 mg weekly, Levemir 22 units QAM  Vial or pen: pen  Glucometer type:     Previous DM " treatments:  Metformin XR - diarrhea   Mounjaro - abdominal pain  Jardiance - ran out of refills   Trulicity - unsure why stopped  Rybelsus - abdominal pain    Last Eye Exam: 6/2022, no DR  Last Podiatry Exam:     REVIEW OF SYSTEMS  Constitutional: no c/o fatigue, weakness. + mild weight loss.   Cardiac: no palpitations or chest pain.  Respiratory: no cough, dyspnea.  GI: no c/o abdominal pain or nausea. Denies h/o pancreatitis.   Skin: no lesions or rashes.  Neuro: no numbness, tingling, paresthesias   Endocrine: denies polyphagia, polyuria. + polydipsia      Personally reviewed Past Medical, Surgical, Social History.    Vital Signs  There were no vitals taken for this visit. -- video visit    Personally reviewed the below labs:    Hemoglobin A1C   Date Value Ref Range Status   03/26/2024 9.7 (H) 4.0 - 5.6 % Final     Comment:     ADA Screening Guidelines:  5.7-6.4%  Consistent with prediabetes  >or=6.5%  Consistent with diabetes    High levels of fetal hemoglobin interfere with the HbA1C  assay. Heterozygous hemoglobin variants (HbS, HgC, etc)do  not significantly interfere with this assay.   However, presence of multiple variants may affect accuracy.     12/09/2023 10.9 (H) 4.0 - 5.6 % Final     Comment:     ADA Screening Guidelines:  5.7-6.4%  Consistent with prediabetes  >or=6.5%  Consistent with diabetes    High levels of fetal hemoglobin interfere with the HbA1C  assay. Heterozygous hemoglobin variants (HbS, HgC, etc)do  not significantly interfere with this assay.   However, presence of multiple variants may affect accuracy.     07/28/2023 10.2 (H) 4.0 - 5.6 % Final     Comment:     ADA Screening Guidelines:  5.7-6.4%  Consistent with prediabetes  >or=6.5%  Consistent with diabetes    High levels of fetal hemoglobin interfere with the HbA1C  assay. Heterozygous hemoglobin variants (HbS, HgC, etc)do  not significantly interfere with this assay.   However, presence of multiple variants may affect accuracy.    "      Chemistry        Component Value Date/Time     03/26/2024 0832    K 4.0 03/26/2024 0832     03/26/2024 0832    CO2 24 03/26/2024 0832    BUN 8 03/26/2024 0832    CREATININE 0.7 03/26/2024 0832     (H) 03/26/2024 0832        Component Value Date/Time    CALCIUM 9.3 03/26/2024 0832    ALKPHOS 86 03/26/2024 0832    AST 22 03/26/2024 0832    ALT 44 03/26/2024 0832    BILITOT 0.6 03/26/2024 0832    ESTGFRAFRICA >60.0 04/20/2022 0933    EGFRNONAA >60.0 04/20/2022 0933          Lab Results   Component Value Date    CHOL 151 03/26/2024    CHOL 252 (H) 12/09/2023    CHOL 220 (H) 07/28/2023     Lab Results   Component Value Date    HDL 30 (L) 03/26/2024    HDL 34 (L) 12/09/2023    HDL 34 (L) 07/28/2023     Lab Results   Component Value Date    LDLCALC 96.4 03/26/2024    LDLCALC 169.4 (H) 12/09/2023    LDLCALC 116.2 07/28/2023     Lab Results   Component Value Date    TRIG 123 03/26/2024    TRIG 243 (H) 12/09/2023    TRIG 349 (H) 07/28/2023     Lab Results   Component Value Date    CHOLHDL 19.9 (L) 03/26/2024    CHOLHDL 13.5 (L) 12/09/2023    CHOLHDL 15.5 (L) 07/28/2023       Lab Results   Component Value Date    MICALBCREAT 48.8 (H) 03/26/2024     Lab Results   Component Value Date    TSH 0.856 12/09/2023       CrCl cannot be calculated (Patient's most recent lab result is older than the maximum 7 days allowed.).    No results found for: "[id number]"      PHYSICAL EXAMINATION  Deferred - video visit       DEXCOM G7 DOWNLOAD: fasting glucoses are mostly elevated. Prandial excursions noted. Of note, first week of download reflects time off of Jardiance. Rare hypoglycemia.           Goals        HEMOGLOBIN A1C < 7               Assessment/Plan  1. Type 2 diabetes mellitus with microalbuminuria, without long-term current use of insulin  -- Uncontrolled. Higher glucoses noted when off of Jardiance for 1 week.   -- increase Levemir to 26 units. Will send in Basaglar to replace it. Advised pt to look for " coupon.   -- increase Ozempic to 1 mg weekly.  -- continue Jardiance, glimepiride   -- continue Dexcom G7. Notify me if BG remain > 180 after meals.   -- schedule eye exam    2. Dyslipidemia  -- controlled  -- continue atorvastatin    3. Obesity (BMI 30-39.9)  -- increases insulin resistance  -- There is no height or weight on file to calculate BMI.       FOLLOW UP  Follow up in about 3 months (around 6/27/2024).   Patient instructed to bring BG logs to each follow up   Patient encouraged to call for any BG/medication issues, concerns, or questions.    Orders Placed This Encounter   Procedures    Hemoglobin A1C    Comprehensive Metabolic Panel

## 2024-03-27 ENCOUNTER — TELEPHONE (OUTPATIENT)
Dept: ENDOCRINOLOGY | Facility: CLINIC | Age: 29
End: 2024-03-27

## 2024-03-27 ENCOUNTER — OFFICE VISIT (OUTPATIENT)
Dept: ENDOCRINOLOGY | Facility: CLINIC | Age: 29
End: 2024-03-27
Payer: COMMERCIAL

## 2024-03-27 DIAGNOSIS — R80.9 TYPE 2 DIABETES MELLITUS WITH MICROALBUMINURIA, WITHOUT LONG-TERM CURRENT USE OF INSULIN: Primary | ICD-10-CM

## 2024-03-27 DIAGNOSIS — E78.5 DYSLIPIDEMIA: ICD-10-CM

## 2024-03-27 DIAGNOSIS — E11.29 TYPE 2 DIABETES MELLITUS WITH MICROALBUMINURIA, WITHOUT LONG-TERM CURRENT USE OF INSULIN: Primary | ICD-10-CM

## 2024-03-27 DIAGNOSIS — E66.9 OBESITY (BMI 30-39.9): ICD-10-CM

## 2024-03-27 PROCEDURE — 99214 OFFICE O/P EST MOD 30 MIN: CPT | Mod: 95,,, | Performed by: NURSE PRACTITIONER

## 2024-03-27 PROCEDURE — 3066F NEPHROPATHY DOC TX: CPT | Mod: CPTII,95,, | Performed by: NURSE PRACTITIONER

## 2024-03-27 PROCEDURE — 1160F RVW MEDS BY RX/DR IN RCRD: CPT | Mod: CPTII,95,, | Performed by: NURSE PRACTITIONER

## 2024-03-27 PROCEDURE — 3046F HEMOGLOBIN A1C LEVEL >9.0%: CPT | Mod: CPTII,95,, | Performed by: NURSE PRACTITIONER

## 2024-03-27 PROCEDURE — 1159F MED LIST DOCD IN RCRD: CPT | Mod: CPTII,95,, | Performed by: NURSE PRACTITIONER

## 2024-03-27 PROCEDURE — 95251 CONT GLUC MNTR ANALYSIS I&R: CPT | Mod: NDTC,,, | Performed by: NURSE PRACTITIONER

## 2024-03-27 PROCEDURE — 3060F POS MICROALBUMINURIA REV: CPT | Mod: CPTII,95,, | Performed by: NURSE PRACTITIONER

## 2024-03-27 RX ORDER — SEMAGLUTIDE 1.34 MG/ML
1 INJECTION, SOLUTION SUBCUTANEOUS
Qty: 3 ML | Refills: 6 | Status: SHIPPED | OUTPATIENT
Start: 2024-03-27 | End: 2024-05-14

## 2024-03-27 RX ORDER — INSULIN GLARGINE 100 [IU]/ML
26 INJECTION, SOLUTION SUBCUTANEOUS DAILY
Qty: 15 ML | Refills: 6 | Status: SHIPPED | OUTPATIENT
Start: 2024-03-27 | End: 2025-03-27

## 2024-04-29 ENCOUNTER — PATIENT MESSAGE (OUTPATIENT)
Dept: ENDOCRINOLOGY | Facility: CLINIC | Age: 29
End: 2024-04-29
Payer: COMMERCIAL

## 2024-05-11 DIAGNOSIS — R80.9 TYPE 2 DIABETES MELLITUS WITH MICROALBUMINURIA, WITHOUT LONG-TERM CURRENT USE OF INSULIN: ICD-10-CM

## 2024-05-11 DIAGNOSIS — E11.29 TYPE 2 DIABETES MELLITUS WITH MICROALBUMINURIA, WITHOUT LONG-TERM CURRENT USE OF INSULIN: ICD-10-CM

## 2024-05-12 ENCOUNTER — PATIENT MESSAGE (OUTPATIENT)
Dept: ENDOCRINOLOGY | Facility: CLINIC | Age: 29
End: 2024-05-12
Payer: COMMERCIAL

## 2024-05-12 DIAGNOSIS — R80.9 TYPE 2 DIABETES MELLITUS WITH MICROALBUMINURIA, WITHOUT LONG-TERM CURRENT USE OF INSULIN: Primary | ICD-10-CM

## 2024-05-12 DIAGNOSIS — E11.29 TYPE 2 DIABETES MELLITUS WITH MICROALBUMINURIA, WITHOUT LONG-TERM CURRENT USE OF INSULIN: Primary | ICD-10-CM

## 2024-05-13 NOTE — TELEPHONE ENCOUNTER
Please call pt. Received refill request for jardiance 10 mg. He should be taking 25 mg per chart. Please advise

## 2024-05-14 RX ORDER — SEMAGLUTIDE 2.68 MG/ML
2 INJECTION, SOLUTION SUBCUTANEOUS
Qty: 3 ML | Refills: 6 | Status: SHIPPED | OUTPATIENT
Start: 2024-05-14 | End: 2025-05-14

## 2024-06-03 ENCOUNTER — OFFICE VISIT (OUTPATIENT)
Dept: URGENT CARE | Facility: CLINIC | Age: 29
End: 2024-06-03
Payer: COMMERCIAL

## 2024-06-03 VITALS
TEMPERATURE: 98 F | RESPIRATION RATE: 18 BRPM | BODY MASS INDEX: 33.13 KG/M2 | WEIGHT: 250 LBS | HEART RATE: 85 BPM | HEIGHT: 73 IN | SYSTOLIC BLOOD PRESSURE: 117 MMHG | DIASTOLIC BLOOD PRESSURE: 85 MMHG | OXYGEN SATURATION: 98 %

## 2024-06-03 DIAGNOSIS — B96.89 BACTERIAL CONJUNCTIVITIS OF BOTH EYES: Primary | ICD-10-CM

## 2024-06-03 DIAGNOSIS — H10.9 BACTERIAL CONJUNCTIVITIS OF BOTH EYES: Primary | ICD-10-CM

## 2024-06-03 PROCEDURE — 99203 OFFICE O/P NEW LOW 30 MIN: CPT | Mod: S$GLB,,, | Performed by: NURSE PRACTITIONER

## 2024-06-03 RX ORDER — POLYMYXIN B SULFATE AND TRIMETHOPRIM 1; 10000 MG/ML; [USP'U]/ML
1 SOLUTION OPHTHALMIC EVERY 4 HOURS
Qty: 10 ML | Refills: 0 | Status: SHIPPED | OUTPATIENT
Start: 2024-06-03 | End: 2024-06-10

## 2024-06-03 NOTE — PATIENT INSTRUCTIONS

## 2024-06-03 NOTE — PROGRESS NOTES
"Subjective:      Patient ID: Roberto Carlos Souza is a 28 y.o. male.    Vitals:  height is 6' 1" (1.854 m) and weight is 113.4 kg (250 lb). His oral temperature is 97.9 °F (36.6 °C). His blood pressure is 117/85 and his pulse is 85. His respiration is 18 and oxygen saturation is 98%.     Chief Complaint: Conjunctivitis    Pt came in today with complaints of possible pink eye in both eyes that started yesterday. He stated that he has been using OTC eye drops for his symptoms.    Provider note begins below:   Patient denies any visual changes, foreign body sensation or injury. He does not wear contacts or glasses.    Conjunctivitis  This is a new problem. The current episode started yesterday. The problem occurs constantly. The problem has been gradually worsening. Associated symptoms include headaches. Pertinent negatives include no abdominal pain, arthralgias, chest pain, chills, coughing, fatigue, fever, joint swelling, nausea, neck pain, rash or vomiting. Nothing aggravates the symptoms. The treatment provided no relief.     Constitution: Negative. Negative for chills, fatigue and fever.   HENT:  Negative for ear pain, ear discharge, facial swelling and sinus pressure.    Neck: Negative for neck pain, neck stiffness and painful lymph nodes.   Cardiovascular: Negative.  Negative for chest pain and sob on exertion.   Eyes:  Positive for eye discharge and eye redness. Negative for eye trauma, foreign body in eye, eye itching, eye pain and vision loss.   Respiratory: Negative.  Negative for chest tightness, cough, shortness of breath, wheezing and asthma.    Gastrointestinal: Negative.  Negative for abdominal pain, nausea and vomiting.   Endocrine: negative. excessive thirst.   Genitourinary: Negative.  Negative for dysuria, frequency, urgency and flank pain.   Musculoskeletal: Negative.  Negative for pain, trauma, joint pain and joint swelling.   Skin: Negative.  Negative for rash, wound, lesion and hives. "   Allergic/Immunologic: Negative.  Negative for eczema, asthma, hives, itching and sneezing.   Neurological:  Positive for headaches. Negative for dizziness, passing out, disorientation and altered mental status.   Hematologic/Lymphatic: Negative.  Negative for swollen lymph nodes.   Psychiatric/Behavioral: Negative.  Negative for altered mental status, disorientation and confusion.       Objective:     Physical Exam   Constitutional: He is oriented to person, place, and time. He appears well-developed. He is cooperative.  Non-toxic appearance. He does not appear ill. No distress.   HENT:   Head: Normocephalic and atraumatic.   Ears:   Right Ear: Hearing normal.   Left Ear: Hearing normal.   Nose: Nose normal. No mucosal edema or nasal deformity. No epistaxis. Right sinus exhibits no maxillary sinus tenderness and no frontal sinus tenderness. Left sinus exhibits no maxillary sinus tenderness and no frontal sinus tenderness.   Mouth/Throat: Uvula is midline, oropharynx is clear and moist and mucous membranes are normal. No trismus in the jaw. Normal dentition. No uvula swelling. No oropharyngeal exudate, posterior oropharyngeal edema or posterior oropharyngeal erythema.   Eyes: Lids are normal. Lids are everted and swept, no foreign bodies found. Right eye exhibits discharge (yellow). Right eye exhibits no hordeolum. No foreign body present in the right eye. Left eye exhibits discharge (yellow). Left eye exhibits no hordeolum. No foreign body present in the left eye. Right conjunctiva is injected. Left conjunctiva is injected. No scleral icterus. Extraocular movement intact vision grossly intact gaze aligned appropriately   Neck: Trachea normal and phonation normal. Neck supple. No edema present. No erythema present. No neck rigidity present.   Cardiovascular: Normal rate, regular rhythm, normal heart sounds and normal pulses.   Pulmonary/Chest: Effort normal and breath sounds normal. No stridor. No respiratory  distress. He has no decreased breath sounds. He has no wheezes. He has no rhonchi. He has no rales. He exhibits no tenderness.   Abdominal: Normal appearance. There is no abdominal tenderness.   Musculoskeletal: Normal range of motion.         General: No deformity. Normal range of motion.   Lymphadenopathy:     He has no cervical adenopathy.   Neurological: no focal deficit. He is alert, oriented to person, place, and time and at baseline. He exhibits normal muscle tone. Coordination normal.   Skin: Skin is warm, dry, intact, not diaphoretic and not pale.   Psychiatric: His speech is normal and behavior is normal. Mood, judgment and thought content normal.   Nursing note and vitals reviewed.    Vision Screening    Right eye Left eye Both eyes   Without correction 20/20 20/20 20/20   With correction            Assessment:     1. Bacterial conjunctivitis of both eyes        Plan:   FOLLOWUP  Follow up if symptoms worsen or fail to improve, for PLEASE CONTACT PCP OR CONTACT THE EMERGENCY ROOM..     PATIENT INSTRUCTIONS  Patient Instructions   INSTRUCTIONS:  - Rest.  - Drink plenty of fluids.  - Take Tylenol and/or Ibuprofen as directed as needed for fever/pain.  Do not take more than the recommended dose.  - follow up with your PCP within the next 1-2 weeks as needed.  - You must understand that you have received an Urgent Care treatment only and that you may be released before all of your medical problems are known or treated.   - You, the patient, will arrange for follow up care as instructed.   - If your condition worsens or fails to improve we recommend that you receive another evaluation at the ER immediately or contact your PCP to discuss your concerns.   - You can call [phone] or [phone] to help schedule an appointment with the appropriate provider.     -If you smoke cigarettes, it would be beneficial for you to stop.         THANK YOU FOR ALLOWING ME TO PARTICIPATE IN YOUR HEALTHCARE,     Chetan  KRUNAL Kerr NP     Bacterial conjunctivitis of both eyes  -     polymyxin B sulf-trimethoprim (POLYTRIM) 10,000 unit- 1 mg/mL Drop; Place 1 drop into both eyes every 4 (four) hours. for 7 days  Dispense: 10 mL; Refill: 0

## 2024-06-08 DIAGNOSIS — F41.1 GAD (GENERALIZED ANXIETY DISORDER): ICD-10-CM

## 2024-06-08 DIAGNOSIS — F33.42 RECURRENT MAJOR DEPRESSIVE DISORDER, IN FULL REMISSION: ICD-10-CM

## 2024-06-11 ENCOUNTER — PATIENT MESSAGE (OUTPATIENT)
Dept: ADMINISTRATIVE | Facility: HOSPITAL | Age: 29
End: 2024-06-11

## 2024-06-11 RX ORDER — BUPROPION HYDROCHLORIDE 300 MG/1
300 TABLET ORAL
Qty: 90 TABLET | Refills: 0 | Status: SHIPPED | OUTPATIENT
Start: 2024-06-11

## 2024-06-14 ENCOUNTER — PATIENT MESSAGE (OUTPATIENT)
Dept: PSYCHIATRY | Facility: CLINIC | Age: 29
End: 2024-06-14

## 2024-07-02 ENCOUNTER — TELEPHONE (OUTPATIENT)
Dept: ENDOCRINOLOGY | Facility: CLINIC | Age: 29
End: 2024-07-02

## 2024-07-12 ENCOUNTER — TELEPHONE (OUTPATIENT)
Dept: ENDOCRINOLOGY | Facility: CLINIC | Age: 29
End: 2024-07-12

## 2024-07-12 NOTE — TELEPHONE ENCOUNTER
----- Message from Pippa Santana sent at 7/12/2024 11:07 AM CDT -----  Contact: Cl from [facility] Prior Auth Dept  Type:  Needs Medical Advice    Who Called:   Cl from [facility] Prior Authorization Dept    Would the patient rather a call back or a response via [employer]?   Call Back  Best Call Back Number:    [phone] - Case Ref#  [id number]    Additional Information:   States she is calling in reference to his blood-glucose sensor (DEXCOM G7 SENSOR) Cookie - states prior authorization has been denied - states she is asking someone from provider's office to inform patient of denial - please call - thank you

## 2024-07-26 ENCOUNTER — TELEPHONE (OUTPATIENT)
Dept: ENDOCRINOLOGY | Facility: CLINIC | Age: 29
End: 2024-07-26

## 2024-09-05 DIAGNOSIS — E11.65 TYPE 2 DIABETES MELLITUS WITH HYPERGLYCEMIA, WITHOUT LONG-TERM CURRENT USE OF INSULIN: ICD-10-CM

## 2024-09-05 RX ORDER — GLIMEPIRIDE 4 MG/1
4 TABLET ORAL
Qty: 90 TABLET | Refills: 0 | Status: SHIPPED | OUTPATIENT
Start: 2024-09-05

## 2024-09-10 ENCOUNTER — PATIENT MESSAGE (OUTPATIENT)
Dept: ADMINISTRATIVE | Facility: HOSPITAL | Age: 29
End: 2024-09-10

## 2024-09-24 ENCOUNTER — PATIENT MESSAGE (OUTPATIENT)
Dept: ADMINISTRATIVE | Facility: HOSPITAL | Age: 29
End: 2024-09-24

## 2024-09-26 ENCOUNTER — PATIENT MESSAGE (OUTPATIENT)
Dept: PSYCHIATRY | Facility: CLINIC | Age: 29
End: 2024-09-26

## 2024-10-05 DIAGNOSIS — F41.8 ANXIETY WITH DEPRESSION: ICD-10-CM

## 2024-10-05 DIAGNOSIS — F33.42 RECURRENT MAJOR DEPRESSIVE DISORDER, IN FULL REMISSION: ICD-10-CM

## 2024-10-05 DIAGNOSIS — F41.1 GAD (GENERALIZED ANXIETY DISORDER): ICD-10-CM

## 2024-10-07 RX ORDER — SERTRALINE HYDROCHLORIDE 100 MG/1
100 TABLET, FILM COATED ORAL DAILY
Qty: 30 TABLET | Refills: 1 | Status: SHIPPED | OUTPATIENT
Start: 2024-10-07

## 2024-10-11 ENCOUNTER — OFFICE VISIT (OUTPATIENT)
Dept: PSYCHIATRY | Facility: CLINIC | Age: 29
End: 2024-10-11
Payer: COMMERCIAL

## 2024-10-11 DIAGNOSIS — F41.1 GAD (GENERALIZED ANXIETY DISORDER): ICD-10-CM

## 2024-10-11 DIAGNOSIS — F90.0 ADHD (ATTENTION DEFICIT HYPERACTIVITY DISORDER), INATTENTIVE TYPE: ICD-10-CM

## 2024-10-11 DIAGNOSIS — F33.0 MILD EPISODE OF RECURRENT MAJOR DEPRESSIVE DISORDER: Primary | ICD-10-CM

## 2024-10-11 PROCEDURE — 3066F NEPHROPATHY DOC TX: CPT | Mod: CPTII,95,,

## 2024-10-11 PROCEDURE — 99215 OFFICE O/P EST HI 40 MIN: CPT | Mod: 95,,,

## 2024-10-11 PROCEDURE — 3046F HEMOGLOBIN A1C LEVEL >9.0%: CPT | Mod: CPTII,95,,

## 2024-10-11 PROCEDURE — 3060F POS MICROALBUMINURIA REV: CPT | Mod: CPTII,95,,

## 2024-10-11 PROCEDURE — 1160F RVW MEDS BY RX/DR IN RCRD: CPT | Mod: CPTII,95,,

## 2024-10-11 PROCEDURE — 1159F MED LIST DOCD IN RCRD: CPT | Mod: CPTII,95,,

## 2024-10-11 PROCEDURE — G2211 COMPLEX E/M VISIT ADD ON: HCPCS | Mod: 95,,,

## 2024-10-11 RX ORDER — LISDEXAMFETAMINE DIMESYLATE 30 MG/1
30 CAPSULE ORAL EVERY MORNING
Qty: 30 CAPSULE | Refills: 0 | Status: SHIPPED | OUTPATIENT
Start: 2024-10-11

## 2024-10-11 NOTE — PROGRESS NOTES
OUTPATIENT PSYCHIATRY FOLLOW UP VISIT    Encounter Date: 10/11/2024    Clinical Status of Patient:  Outpatient (Virtual)  The patient location is: [address] [place] [address]  The patient phone number is: [phone]   Visit type: Virtual visit with synchronous audio and video  Each patient to whom he or she provides medical services by telemedicine is:  (1) informed of the relationship between the practitioner and patient and the respective role of any other health care provider with respect to management of the patient; and (2) notified that he or she may decline to receive medical services by telemedicine and may withdraw from such care at any time.    Chief Complaint:  Roberto Carlos Souza is a 29 y.o. male who presents today for follow-up.  Met with patient.      HISTORY OF PRESENTING ILLNESS:  Roberto Carlos Souza is a 29 y.o. male with history of MDD, MARCOS, and ADHD-IT who presents for follow up appointment.      Plan at last appointment:   Continue sertraline 150 mg daily for mood and anxiety  Continue bupropion  mg q.a.m. for mood   Continue buspirone 5-7.5 mg p.o. t.i.d. p.r.n. anxiety  INCREASE dextroamphetamine-amphetamine XR from 30 to 40 mg q.a.m. for ADHD  Offered referral for individual psychotherapy    Psychotropic medication history:   Lexapro      INTERVAL HISTORY:    Pt has been lost to follow up since last visit in January, 9 months ago.     Today he reports depressed mood. States he has been adherent to zoloft and wellbutrin. Pt states he did not notice that his mood had worsened but was told by his wife. After she brought this to his attention he became aware of his low mood.  She also told him his ADHD has worsened recently.     He reports changes in mood as well as increased anxiety are related to the birth of his 2nd child which is scheduled next week.     At last visit, dextroamphetamine-amphetamine XR was increased from 30 to 40 mg q.a.m. for uncontrolled symptoms of ADHD. Pt reports  no improvement in symptoms with increase in dose. Reports continued difficulty maintaining attention and concentration, especially at work.    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  Denied interval or current suicidal/homicidal thoughts, intent, or plan or NSSI.  Denied other questions and concerns.    Medication side effects: None  Medication adherence: yes    MEDICAL REVIEW OF SYSTEMS:   Pain: Denies any significant chronic or acute pain.  Constitutional: Denies fever or change in appetite.  Cardiovascular: Denies chest pain or exertional dyspnea.  Respiratory: Artemio cough or orthopnea.   GI: Denies abdominal pain, N/V  Neurological: Denies tremor, seizure, or focal weakness.  Psychiatric: See HPI above.    PAST PSYCHIATRIC, MEDICAL, AND SOCIAL HISTORY REVIEWED  The patient's past medical, family and social history have been reviewed and updated as appropriate within the electronic medical record - see encounter notes.    PAST MEDICAL HISTORY:   Past Medical History:   Diagnosis Date    Anxiety     Diabetes mellitus, type 2 2015    Dyslipidemia     Hypertension     Previously on lisinopril    Obesity      Head trauma/Loss of consciousness: denies  Seizures: denies     PAST PSYCHIATRIC HISTORY:  First psych contact: at 20 for depression and anxiety    Prior hospitalizations: denies  Prior suicide attempts or self-harm: denies  Prior diagnosis: MDD, MARCOS  Prior psychotherapy: individual psychotherapy @ [age], recently  "with Dr. Nettles     EXAM:  Constitutional  Vitals:  Most recent vital signs were reviewed.   Last 3 sets of VS:      12/21/2023     8:03 AM 1/23/2024     1:52 PM 6/3/2024    10:21 AM   Vitals - 1 value per visit   SYSTOLIC 130 139 117   DIASTOLIC 100 84 85   Pulse 87 96 85   Temp   97.9 °F (36.6 °C)   Resp   18   SPO2   98 %   Weight (lb) 245.04 250 250   Weight (kg) 111.15 113.4 113.399   Height 6' 1" (1.854 m) 6' 1" (1.854 m) 6' 1" (1.854 m)   BMI (Calculated) 32.3 33 33   Pain Score Zero Zero Four      General:  unremarkable, age appropriate     Musculoskeletal  Muscle Strength/Tone:  No tremors appreciated   Gait & Station:  Unable to assess, Pt seated during virtual visit     Psychiatric  Speech:  no latency; no press   Mood & Affect:  sad  congruent and appropriate   Thought Process:  normal and logical   Associations:  intact   Thought Content:  normal, no suicidality, no homicidality, delusions, or paranoia   Insight:  intact   Judgement: behavior is adequate to circumstances   Orientation:  grossly intact   Memory: intact for content of interview   Language: grossly intact   Attention Span & Concentration:  Intact to interview   Fund of Knowledge:  Not formally tested     SUICIDE RISK ASSESSMENT:  Protective factors: age, gender, no prior attempts, no prior hospitalizations, no ongoing substance abuse, no psychosis, , wife is currently pregnant with their 1st child, denies SI/intent/plan, seeking treatment, access to treatment, future oriented, good primary support, no access to firearms  Risks: ongoing anxiety and depression  Patient is a low immediate and long-term risk considering risk factors    RELEVANT LABS/STUDIES:    Lab Results   Component Value Date    WBC 7.59 12/09/2023    HGB 17.0 12/09/2023    HCT 47.9 12/09/2023    MCV 82 12/09/2023     12/09/2023     BMP  Lab Results   Component Value Date     03/26/2024    K 4.0 03/26/2024     03/26/2024    CO2 24 03/26/2024    " BUN 8 03/26/2024    CREATININE 0.7 03/26/2024    CALCIUM 9.3 03/26/2024    ANIONGAP 10 03/26/2024    ESTGFRAFRICA >60.0 04/20/2022    EGFRNONAA >60.0 04/20/2022     Lab Results   Component Value Date    ALT 44 03/26/2024    AST 22 03/26/2024    ALKPHOS 86 03/26/2024    BILITOT 0.6 03/26/2024     Lab Results   Component Value Date    TSH 0.856 12/09/2023     Lab Results   Component Value Date    HGBA1C 9.7 (H) 03/26/2024       IMPRESSION:    Roberto Carlos Souza is a 29 y.o. male with history of MDD and MARCOS who presents for follow up appointment.    Status/Progress: Based on the examination today, the patient's problem(s) is/are inadequately controlled.  New problems have not been presented today.   Co-morbidities are not complicating management of the primary condition.  There are no active rule-out diagnoses for this patient at this time.     Patient reports no improvement in symptoms of ADHD after increasing dextroamphetamine-amphetamine XR from 30 to 40 mg q.a.m. at last visit.      Risk Parameters:  Patient reports no suicidal ideation  Patient reports no homicidal ideation  Patient reports no self-injurious behavior  Patient reports no violent behavior    DIAGNOSES:    ICD-10-CM ICD-9-CM   1. Mild episode of recurrent major depressive disorder  F33.0 296.31   2. MARCOS (generalized anxiety disorder)  F41.1 300.02   3. ADHD (attention deficit hyperactivity disorder), inattentive type  F90.0 314.00       PLAN:  Start lisdexamfetamine 30 mg daily for ADHD   Continue sertraline 150 mg daily for mood and anxiety  Continue bupropion  mg q.a.m. for mood   Continue buspirone 5-7.5 mg p.o. t.i.d. p.r.n. anxiety  Offered referral for individual psychotherapy      RETURN TO CLINIC:   1 month      ESTELLA Becerril, PMHNP-BC      40 minutes of total time spent on the encounter, which includes face to face time and non-face to face time preparing to see the patient (eg. review of tests), obtaining and/or reviewing  separately obtained history, documenting clinical information in the electronic health record, independently interpreting results (not separately reported), and communicating results to the patient/family/caregiver, or care coordination (not separately reported).     Visit today included managing the longitudinal care of the patient due to the serious and/or complex managed problem(s) MDD, MARCOS, ADHD.    At this time there are no indications the patient represents an imminent danger to either themselves or others; will continue to manage treatment in the outpatient setting.    I discussed the patient's care with the patient including benefits, alternatives, possible adverse effects of the treatment plan; including the potential for metabolic complications, major organ dysfunction, black box warnings, and contraindications. The opportunity was given for questions/clarification, and after this discussion the above treatment plan was devised through shared decision making. The patient voiced their understanding of the diagnoses and treatments listed above and agreed to the treatment plan. Follow up plan was reviewed with the patient. The patient was advised to call to report any worsening of symptoms or problems with medication.    Supportive therapy and psychoeducation provided. Patient has been given crisis information including Suicide and Crisis Lifeline (call or text: 103). Patient also given instructions to go to the nearest ER or call 911 if unable to remain safe or if the Pt develops thoughts of harming self or others.    Documentation entered by me for this encounter may have been done in part using [employer] voice recognition transcription software. Garbled syntax, mangled pronouns, and other bizarre constructions may be attributed to that software system.

## 2024-10-11 NOTE — PROGRESS NOTES
"OUTPATIENT PSYCHIATRY FOLLOW UP VISIT    Encounter Date: 10/11/2024    Clinical Status of Patient:  Outpatient (Virtual)  The patient location is: [address] [place] [address]  The patient phone number is: [phone]   Visit type: Virtual visit with synchronous audio and video  Each patient to whom he or she provides medical services by telemedicine is:  (1) informed of the relationship between the practitioner and patient and the respective role of any other health care provider with respect to management of the patient; and (2) notified that he or she may decline to receive medical services by telemedicine and may withdraw from such care at any time.    Chief Complaint:  Roberto Carlos Souza is a 29 y.o. male who presents today for follow-up.  Met with patient.      HISTORY OF PRESENTING ILLNESS:  Roberto Carlos Souza is a 29 y.o. male with history of MDD, MARCOS, and ADHD-IT who presents for follow up appointment.      11/14/2023: Patient reports no change in symptoms of ADHD after starting dextroamphetamine-amphetamine XR 20 mg q.a.m.  He states his wife also confirms she has not seen improvement in the patient's symptoms either  Mood continues to be stable   Anxiety is well controlled.  Baby is 3 months, is currently ill, this is his 1st illness episode. Was sleeping 6-7 hours prior to illness.     1/23/2024: Dextroamphetamine-amphetamine was increased from 20 to 30 mg q.a.m. at last visit 2 months ago. Pt reports no real improvement in attention and concentration. "I feel a little better but I just space out sometimes." Denies adverse effects with increase in dose. Denies restlessness or fidgeting, but states he is "unable to concentrate all over the place." Requests to increase dose.  Pt denies cardiovascular events including increased heart rate or increased blood pressure, palpitations, chest pain, dizziness, lightheadedness, or syncopal episodes. Pt denies A/V hallucinations or behavioral effects. Pt denies " "anorexia, decreased appetite, xerostomia, headache, insomnia, or digital changes.     Mood continues to be stable.   Anxiety continues to be well controlled.    Started Ozempic approx 1 month ago.    Plan at last appointment:   Continue sertraline 150 mg daily for mood and anxiety  Continue bupropion  mg q.a.m. for mood   Continue buspirone 5-7.5 mg p.o. t.i.d. p.r.n. anxiety  INCREASE dextroamphetamine-amphetamine XR from 30 to 40 mg q.a.m. for ADHD  Offered referral for individual psychotherapy      Psychotropic medication history:   Lexapro      INTERVAL HISTORY:        Dextroamphetamine-amphetamine was increased from 20 to 30 mg q.a.m. at last visit 2 months ago. Pt reports no real improvement in attention and concentration. "I feel a little better but I just space out sometimes." Denies adverse effects with increase in dose. Denies restlessness or fidgeting, but states he is "unable to concentrate all over the place." Requests to increase dose.  Pt denies cardiovascular events including increased heart rate or increased blood pressure, palpitations, chest pain, dizziness, lightheadedness, or syncopal episodes. Pt denies A/V hallucinations or behavioral effects. Pt denies anorexia, decreased appetite, xerostomia, headache, insomnia, or digital changes.     Mood continues to be stable.   Anxiety continues to be well controlled.    Started Ozempic approx 1 month ago.      Mood is worse, reports depressed mood. Is taking zoloft and wellbutrin. Pt states he did not notice that his mood had worsened but was told by his wife. She also told him his ADHD has worsened recently.   He states no difference with any formulation or dose of Adderall.     Having another child Thursday.           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  Denied interval or current suicidal/homicidal thoughts, intent, or plan or NSSI.  Denied other questions and concerns.    Medication side effects: None  Medication adherence: yes    MEDICAL REVIEW OF SYSTEMS:   Pain: Denies any significant chronic or acute pain.  Constitutional: Denies fever or change in appetite.  Cardiovascular: Denies chest pain or exertional dyspnea.  Respiratory: Artemio cough or orthopnea.   GI: Denies abdominal pain, N/V  Neurological: Denies tremor, seizure, or focal weakness.  Psychiatric: See HPI above.    PAST PSYCHIATRIC, MEDICAL, AND SOCIAL HISTORY REVIEWED  The patient's past medical, family and social history have been reviewed and updated as appropriate within the electronic medical record - see encounter notes.    PAST MEDICAL HISTORY:   Past Medical History:   Diagnosis Date    Anxiety     Diabetes mellitus, type 2 2015    Dyslipidemia     Hypertension     Previously on lisinopril    Obesity      Head trauma/Loss of consciousness: denies  Seizures: denies     PAST PSYCHIATRIC HISTORY:  First psych contact: at 20 for depression and anxiety    Prior hospitalizations: denies  Prior suicide attempts or self-harm: denies  Prior diagnosis: MDD, MARCOS  Prior psychotherapy: individual psychotherapy @ [age], recently with Dr. Nettles     MEDICATIONS:    Current Outpatient Medications:     atorvastatin (LIPITOR) 40 MG tablet, Take 1 tablet (40 mg total) by mouth once daily., Disp: 90 tablet, Rfl: 3    BASAGLAR KWIKPEN U-100 INSULIN glargine 100 units/mL SubQ pen, Inject 26 Units into the skin once daily., Disp: 15 mL, Rfl: 6    blood-glucose sensor (DEXCOM G7 SENSOR) Cookie, 1  "Device by Misc.(Non-Drug; Combo Route) route every 10 days., Disp: 3 each, Rfl: 6    buPROPion (WELLBUTRIN XL) 300 MG 24 hr tablet, TAKE 1 TABLET BY MOUTH EVERY DAY, Disp: 90 tablet, Rfl: 0    dextroamphetamine-amphetamine (ADDERALL XR) 20 MG 24 hr capsule, Take 2 capsules (40 mg total) by mouth every morning., Disp: 60 capsule, Rfl: 0    empagliflozin (JARDIANCE) 25 mg tablet, Take 1 tablet (25 mg total) by mouth once daily., Disp: 30 tablet, Rfl: 6    empagliflozin (JARDIANCE) 25 mg tablet, Take 1 tablet (25 mg total) by mouth once daily., Disp: 30 tablet, Rfl: 6    glimepiride (AMARYL) 4 MG tablet, TAKE 1 TABLET BY MOUTH DAILY WITH BREAKFAST, Disp: 90 tablet, Rfl: 0    semaglutide (OZEMPIC) 2 mg/dose (8 mg/3 mL) PnIj, Inject 2 mg into the skin every 7 days., Disp: 3 mL, Rfl: 6    sertraline (ZOLOFT) 100 MG tablet, Take 1 tablet (100 mg total) by mouth once daily., Disp: 30 tablet, Rfl: 1    ALLERGIES:  Review of patient's allergies indicates:   Allergen Reactions    Mounjaro [tirzepatide] Other (See Comments)     Patient developed with using Mounjaro, indigestion, belching, abdominal pain, diarrhea, upset stomach and nausea.  Was advised to discontinue the medication; please see e-mail addressing the issue..  Above GI complaints were addressed.  Pain was rated about a 6; he was advised to go to the emergency room should it worsen    Metformin      Discontinued with hematochezia; symptoms markedly improved but still persistent; remain off metformin    Rybelsus [semaglutide] Other (See Comments)     abdominal pain, nausea       EXAM:  Constitutional  Vitals:  Most recent vital signs were reviewed.   Last 3 sets of VS:      12/21/2023     8:03 AM 1/23/2024     1:52 PM 6/3/2024    10:21 AM   Vitals - 1 value per visit   SYSTOLIC 130 139 117   DIASTOLIC 100 84 85   Pulse 87 96 85   Temp   97.9 °F (36.6 °C)   Resp   18   SPO2   98 %   Weight (lb) 245.04 250 250   Weight (kg) 111.15 113.4 113.399   Height 6' 1" (1.854 " "m) 6' 1" (1.854 m) 6' 1" (1.854 m)   BMI (Calculated) 32.3 33 33   Pain Score Zero Zero Four      General:  unremarkable, age appropriate     Musculoskeletal  Muscle Strength/Tone:  No tremors appreciated   Gait & Station:  Unable to assess, Pt seated during virtual visit     Psychiatric  Speech:  no latency; no press   Mood & Affect:  euthymic  congruent and appropriate   Thought Process:  normal and logical   Associations:  intact   Thought Content:  normal, no suicidality, no homicidality, delusions, or paranoia   Insight:  intact   Judgement: behavior is adequate to circumstances   Orientation:  grossly intact   Memory: intact for content of interview   Language: grossly intact   Attention Span & Concentration:  able to focus   Fund of Knowledge:  intact and appropriate to age and level of education     SUICIDE RISK ASSESSMENT:  Protective factors: age, gender, no prior attempts, no prior hospitalizations, no ongoing substance abuse, no psychosis, , wife is currently pregnant with their 1st child, denies SI/intent/plan, seeking treatment, access to treatment, future oriented, good primary support, no access to firearms  Risks: ongoing anxiety and depression  Patient is a low immediate and long-term risk considering risk factors    RELEVANT LABS/STUDIES:    Lab Results   Component Value Date    WBC 7.59 12/09/2023    HGB 17.0 12/09/2023    HCT 47.9 12/09/2023    MCV 82 12/09/2023     12/09/2023     BMP  Lab Results   Component Value Date     03/26/2024    K 4.0 03/26/2024     03/26/2024    CO2 24 03/26/2024    BUN 8 03/26/2024    CREATININE 0.7 03/26/2024    CALCIUM 9.3 03/26/2024    ANIONGAP 10 03/26/2024    ESTGFRAFRICA >60.0 04/20/2022    EGFRNONAA >60.0 04/20/2022     Lab Results   Component Value Date    ALT 44 03/26/2024    AST 22 03/26/2024    ALKPHOS 86 03/26/2024    BILITOT 0.6 03/26/2024     Lab Results   Component Value Date    TSH 0.856 12/09/2023     Lab Results   Component " Value Date    HGBA1C 9.7 (H) 03/26/2024       IMPRESSION:    Roberto Carlos Souza is a 29 y.o. male with history of MDD and MARCOS who presents for follow up appointment.    Status/Progress: Based on the examination today, the patient's problem(s) is/are adequately but not ideally controlled.  New problems have not been presented today.   Co-morbidities are not complicating management of the primary condition.  There are no active rule-out diagnoses for this patient at this time.     Patient reports no improvement in symptoms of ADHD after starting dextroamphetamine-amphetamine XR 20 mg q.a.m. at last visit.  Through shared decision making, dextroamphetamine amphetamine XR will be increased to 30 mg q.a.m. with follow up in 1 month.    Reports minor improvement in symptoms with increase to 30 mg daily, but reports significant impairment from remaining symptoms and requests to increase dose.     Risk Parameters:  Patient reports no suicidal ideation  Patient reports no homicidal ideation  Patient reports no self-injurious behavior  Patient reports no violent behavior    DIAGNOSES:  No diagnosis found.      PLAN:  Continue sertraline 150 mg daily for mood and anxiety  Continue bupropion  mg q.a.m. for mood   Continue buspirone 5-7.5 mg p.o. t.i.d. p.r.n. anxiety  INCREASE dextroamphetamine-amphetamine XR from 30 to 40 mg q.a.m. for ADHD  Offered referral for individual psychotherapy      RETURN TO CLINIC:   1 month      ESTELLA Becerril, PMHNP-BC      42 minutes of total time spent on the encounter, which includes face to face time and non-face to face time preparing to see the patient (eg. review of tests), obtaining and/or reviewing separately obtained history, documenting clinical information in the electronic health record, independently interpreting results (not separately reported), and communicating results to the patient/family/caregiver, or care coordination (not separately reported).     At this time there  "are no indications the patient represents an imminent danger to either themselves or others; will continue to manage treatment in the outpatient setting.    I discussed the patient's care with the patient including benefits, alternatives, possible adverse effects of the treatment plan; including the potential for metabolic complications, major organ dysfunction, black box warnings, and contraindications. The opportunity was given for questions/clarification, and after this discussion the above treatment plan was devised through shared decision making. The patient voiced their understanding of the diagnoses and treatments listed above and agreed to the treatment plan. Follow up plan was reviewed with the patient. The patient was advised to call to report any worsening of symptoms or problems with medication.    Supportive therapy and psychoeducation provided. Patient has been given crisis information including Suicide and Crisis Lifeline (call or text: 116). Patient also given instructions to go to the nearest ER or call 911 if unable to remain safe or if the Pt develops thoughts of harming self or others.    Documentation entered by me for this encounter may have been done in part using [employer] Direct voice recognition transcription software. Garbled syntax, mangled pronouns, and other bizarre constructions may be attributed to that software system. Although I have made an effort to ensure accuracy, "sound like" errors may exist and should be interpreted in context.      "

## 2024-11-04 DIAGNOSIS — F41.1 GAD (GENERALIZED ANXIETY DISORDER): ICD-10-CM

## 2024-11-04 DIAGNOSIS — F33.42 RECURRENT MAJOR DEPRESSIVE DISORDER, IN FULL REMISSION: ICD-10-CM

## 2024-11-04 DIAGNOSIS — F41.8 ANXIETY WITH DEPRESSION: ICD-10-CM

## 2024-11-04 RX ORDER — SERTRALINE HYDROCHLORIDE 100 MG/1
100 TABLET, FILM COATED ORAL
Qty: 90 TABLET | Refills: 1 | Status: SHIPPED | OUTPATIENT
Start: 2024-11-04

## 2024-11-06 ENCOUNTER — PATIENT MESSAGE (OUTPATIENT)
Dept: FAMILY MEDICINE | Facility: CLINIC | Age: 29
End: 2024-11-06
Payer: COMMERCIAL

## 2024-11-06 ENCOUNTER — PATIENT MESSAGE (OUTPATIENT)
Dept: PSYCHIATRY | Facility: CLINIC | Age: 29
End: 2024-11-06
Payer: COMMERCIAL

## 2024-11-06 DIAGNOSIS — F33.42 RECURRENT MAJOR DEPRESSIVE DISORDER, IN FULL REMISSION: ICD-10-CM

## 2024-11-06 DIAGNOSIS — Z30.09 VASECTOMY EVALUATION: Primary | ICD-10-CM

## 2024-11-06 DIAGNOSIS — F41.1 GAD (GENERALIZED ANXIETY DISORDER): ICD-10-CM

## 2024-11-07 RX ORDER — BUPROPION HYDROCHLORIDE 300 MG/1
300 TABLET ORAL DAILY
Qty: 90 TABLET | Refills: 0 | Status: SHIPPED | OUTPATIENT
Start: 2024-11-07

## 2024-11-13 ENCOUNTER — PATIENT MESSAGE (OUTPATIENT)
Dept: ENDOCRINOLOGY | Facility: CLINIC | Age: 29
End: 2024-11-13
Payer: COMMERCIAL

## 2024-11-17 ENCOUNTER — PATIENT MESSAGE (OUTPATIENT)
Dept: ENDOCRINOLOGY | Facility: CLINIC | Age: 29
End: 2024-11-17
Payer: COMMERCIAL

## 2024-11-18 ENCOUNTER — OFFICE VISIT (OUTPATIENT)
Dept: UROLOGY | Facility: CLINIC | Age: 29
End: 2024-11-18
Payer: COMMERCIAL

## 2024-11-18 ENCOUNTER — LAB VISIT (OUTPATIENT)
Dept: LAB | Facility: HOSPITAL | Age: 29
End: 2024-11-18
Attending: NURSE PRACTITIONER
Payer: COMMERCIAL

## 2024-11-18 VITALS — HEIGHT: 73 IN | WEIGHT: 236.56 LBS | BODY MASS INDEX: 31.35 KG/M2

## 2024-11-18 DIAGNOSIS — Z30.2 ENCOUNTER FOR STERILIZATION: Primary | ICD-10-CM

## 2024-11-18 DIAGNOSIS — E11.29 TYPE 2 DIABETES MELLITUS WITH MICROALBUMINURIA, WITHOUT LONG-TERM CURRENT USE OF INSULIN: ICD-10-CM

## 2024-11-18 DIAGNOSIS — R80.9 TYPE 2 DIABETES MELLITUS WITH MICROALBUMINURIA, WITHOUT LONG-TERM CURRENT USE OF INSULIN: ICD-10-CM

## 2024-11-18 DIAGNOSIS — Z30.09 VASECTOMY EVALUATION: ICD-10-CM

## 2024-11-18 LAB
ALBUMIN SERPL BCP-MCNC: 2.8 G/DL (ref 3.5–5.2)
ALP SERPL-CCNC: 74 U/L (ref 40–150)
ALT SERPL W/O P-5'-P-CCNC: 103 U/L (ref 10–44)
ANION GAP SERPL CALC-SCNC: 11 MMOL/L (ref 8–16)
AST SERPL-CCNC: 74 U/L (ref 10–40)
BILIRUB SERPL-MCNC: 0.7 MG/DL (ref 0.1–1)
BUN SERPL-MCNC: 8 MG/DL (ref 6–20)
CALCIUM SERPL-MCNC: 8.5 MG/DL (ref 8.7–10.5)
CHLORIDE SERPL-SCNC: 100 MMOL/L (ref 95–110)
CO2 SERPL-SCNC: 24 MMOL/L (ref 23–29)
CREAT SERPL-MCNC: 0.9 MG/DL (ref 0.5–1.4)
EST. GFR  (NO RACE VARIABLE): >60 ML/MIN/1.73 M^2
ESTIMATED AVG GLUCOSE: 177 MG/DL (ref 68–131)
GLUCOSE SERPL-MCNC: 192 MG/DL (ref 70–110)
HBA1C MFR BLD: 7.8 % (ref 4–5.6)
POTASSIUM SERPL-SCNC: 3.8 MMOL/L (ref 3.5–5.1)
PROT SERPL-MCNC: 6.3 G/DL (ref 6–8.4)
SODIUM SERPL-SCNC: 135 MMOL/L (ref 136–145)

## 2024-11-18 PROCEDURE — 1160F RVW MEDS BY RX/DR IN RCRD: CPT | Mod: CPTII,S$GLB,,

## 2024-11-18 PROCEDURE — 3008F BODY MASS INDEX DOCD: CPT | Mod: CPTII,S$GLB,,

## 2024-11-18 PROCEDURE — 36415 COLL VENOUS BLD VENIPUNCTURE: CPT | Mod: PO | Performed by: NURSE PRACTITIONER

## 2024-11-18 PROCEDURE — 83036 HEMOGLOBIN GLYCOSYLATED A1C: CPT | Performed by: NURSE PRACTITIONER

## 2024-11-18 PROCEDURE — 99999 PR PBB SHADOW E&M-EST. PATIENT-LVL III: CPT | Mod: PBBFAC,,,

## 2024-11-18 PROCEDURE — 3060F POS MICROALBUMINURIA REV: CPT | Mod: CPTII,S$GLB,,

## 2024-11-18 PROCEDURE — 99203 OFFICE O/P NEW LOW 30 MIN: CPT | Mod: S$GLB,,,

## 2024-11-18 PROCEDURE — 1159F MED LIST DOCD IN RCRD: CPT | Mod: CPTII,S$GLB,,

## 2024-11-18 PROCEDURE — 80053 COMPREHEN METABOLIC PANEL: CPT | Performed by: NURSE PRACTITIONER

## 2024-11-18 PROCEDURE — 3066F NEPHROPATHY DOC TX: CPT | Mod: CPTII,S$GLB,,

## 2024-11-18 PROCEDURE — 3046F HEMOGLOBIN A1C LEVEL >9.0%: CPT | Mod: CPTII,S$GLB,,

## 2024-11-18 RX ORDER — DIAZEPAM 10 MG/1
10 TABLET ORAL ONCE
Qty: 1 TABLET | Refills: 0 | Status: SHIPPED | OUTPATIENT
Start: 2024-11-18 | End: 2024-11-22

## 2024-11-18 NOTE — PROGRESS NOTES
"[facility] Note  Staff: AIMEE Douglas    PCP: MD Robb    Chief Complaint: Vas Eval    Subjective:        HPI: Roberto Carlos Souza is a 29 y.o. male NEW PATIENT presents today for vasectomy evaluation. He and his wife desire permanent sterility. He states they have two children and do not wish to have anymore. He states his wife is currently recovering from childbirth. He understands that a vasectomy will result in permanent sterility. He is healthy and has no urinary complaints. The procedure was explained in detail. All risks including bleeding, infection, hematoma, and failure were discussed. He understands that he will not be immediately sterile and that alternative birth control should be used until azospermia can be confirmed microscopically. Post procedural pain and limitations were discussed. 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 He was given written instructions and all questions answered.     Questions asked the pt during ov today:  Urgency: no, urge incontinence? no  Dysuria: No  Gross Hematuria:No  Straining:No, Hesistancy:No, Intermittency:No}, Weak stream:No    Last PSA Screening: No results found for: "PSA", "PSADIAG"    History of Kidney Stones?:  no    Constipation issues?:  no    REVIEW OF SYSTEMS:  Review of Systems   Constitutional: Negative.  Negative for chills and fever.   Gastrointestinal: Negative.  Negative for abdominal pain, constipation, diarrhea, nausea and vomiting.   Genitourinary: Negative.  Negative for dysuria, flank pain, frequency, hematuria and urgency.   Musculoskeletal: Negative.  Negative for back pain.       PMHx:  Past Medical History:   Diagnosis Date    Anxiety     Diabetes mellitus, type 2 2015    Dyslipidemia     Hypertension     Previously on lisinopril    Obesity        PSHx:  Past Surgical History:   Procedure Laterality Date    LASIK Bilateral 2015    SPINE SURGERY  2011    2 disc removed; herniated " disc. Lower back. Left leg crush injury       Fam Hx:   malignancies: No    kidney stones: No     Soc Hx:  , lives in [place]    Allergies:  Mounjaro [tirzepatide], Metformin, and Rybelsus [semaglutide]    Medications: reviewed     Objective:   There were no vitals filed for this visit.    Physical Exam  Constitutional:       Appearance: Normal appearance.   Pulmonary:      Effort: Pulmonary effort is normal.   Abdominal:      General: There is no distension.      Palpations: Abdomen is soft.      Tenderness: There is no abdominal tenderness.   Musculoskeletal:         General: Normal range of motion.      Cervical back: Normal range of motion.   Skin:     General: Skin is warm.   Neurological:      Mental Status: He is oriented to person, place, and time.   Psychiatric:         Mood and Affect: Mood normal.         Behavior: Behavior normal.          EXAM performed by me in office today:  shannon vas palpated  No scrotal rashes, cysts or lesions  Epididymis normal in size, no tenderness  Testes normal and size, equal size bilaterally, no masses  Urethral meatus normal without discharge      Assessment:       1. Encounter for sterilization    2. Vasectomy evaluation          Plan:     Pt scheduled for in office vasectomy at earliest convenience  Written instructions given to patient for pre and post procedural care, pt verbalized understanding    F/u per treatment plan    [facility]: active    AIMEE Douglas

## 2024-11-19 ENCOUNTER — PATIENT MESSAGE (OUTPATIENT)
Dept: FAMILY MEDICINE | Facility: CLINIC | Age: 29
End: 2024-11-19
Payer: COMMERCIAL

## 2024-11-19 NOTE — PROGRESS NOTES
"CC: Mr. Roberto Carlos Souza arrives today for management of Type 2 DM and review of chronic medical conditions, as listed in the Visit Diagnosis section of this encounter.       HPI: Mr. Roberto Carlos Souza was diagnosed with Type 2 DM in at least 2017. He was diagnosed based on lab work. Initial treatment consisted of orals. Insulin was added in 7/2023. + FH of DM in both parents (Type 1). Denies hospitalizations due to DM.     Patient was last seen by me in March. At that time, Ozempic dose was increased.     He recently finished Levemir supply and changed to Basaglar ~ 2 weeks ago.     Two weeks ago, he began experiencing evening chills, weakness, decreased appetite, visual disturbances when turning head, dyspnea. He stopped all DM meds last week. However, last Ozempic dose was 11/8. His symptoms are persisting.     Prior to the past 2 weeks, he was tolerating regimen (including Ozempic) well. He states that he's been off of glimepiride for months because he ran out of refills. However, this was sent to pharmacy in September.     BG monitoring per Dexcom G7.    Hypoglycemia: No    Missing Insulin/PO medication doses: Yes -- currently holding all DM meds    Exercise: No    Dietary Habits:  Eats 1 meals/day. Reports poor appetite.  Avoids sugary beverages.    Last DM education appointment: 2017    Regarding hyperlipidemia, he is tolerating atorvastatin.       CURRENT DIABETIC MEDS:  Jardiance 25 mg daily, Ozempic 2 mg weekly, Basaglar 26 units QAM  --- off of all meds for 1-2 weeks  Vial or pen: pen  Glucometer type:     Previous DM treatments:  Metformin XR - diarrhea   Mounjaro - abdominal pain  Jardiance - ran out of refills   Trulicity - unsure why stopped  Rybelsus - abdominal pain  Glimepiride  Levemir    Last Eye Exam: 6/2022, no DR  Last Podiatry Exam:     REVIEW OF SYSTEMS  Constitutional: + fatigue, + weakness, + chills in late afternoon. Denies fever.   Cardiac: no palpitations or chest pain. + "tightness" to R " "side of chest  Respiratory: no cough. + dyspnea on exertion  GI: no c/o abdominal pain. Denies h/o pancreatitis. + decreased appetite, nausea.   Skin: no lesions or rashes.  Neuro: + tingling in legs. + "black and white" spots when he turns his head.   Endocrine: denies polyphagia, polyuria. + polydipsia      Personally reviewed Past Medical, Surgical, Social History.    Vital Signs  /60   Pulse 94   Ht 6' 1" (1.854 m)   Wt 108.4 kg (239 lb 1.4 oz)   BMI 31.54 kg/m²      Personally reviewed the below labs:    Hemoglobin A1C   Date Value Ref Range Status   11/18/2024 7.8 (H) 4.0 - 5.6 % Final     Comment:     ADA Screening Guidelines:  5.7-6.4%  Consistent with prediabetes  >or=6.5%  Consistent with diabetes    High levels of fetal hemoglobin interfere with the HbA1C  assay. Heterozygous hemoglobin variants (HbS, HgC, etc)do  not significantly interfere with this assay.   However, presence of multiple variants may affect accuracy.     03/26/2024 9.7 (H) 4.0 - 5.6 % Final     Comment:     ADA Screening Guidelines:  5.7-6.4%  Consistent with prediabetes  >or=6.5%  Consistent with diabetes    High levels of fetal hemoglobin interfere with the HbA1C  assay. Heterozygous hemoglobin variants (HbS, HgC, etc)do  not significantly interfere with this assay.   However, presence of multiple variants may affect accuracy.     12/09/2023 10.9 (H) 4.0 - 5.6 % Final     Comment:     ADA Screening Guidelines:  5.7-6.4%  Consistent with prediabetes  >or=6.5%  Consistent with diabetes    High levels of fetal hemoglobin interfere with the HbA1C  assay. Heterozygous hemoglobin variants (HbS, HgC, etc)do  not significantly interfere with this assay.   However, presence of multiple variants may affect accuracy.         Chemistry        Component Value Date/Time     (L) 11/18/2024 1532    K 3.8 11/18/2024 1532     11/18/2024 1532    CO2 24 11/18/2024 1532    BUN 8 11/18/2024 1532    CREATININE 0.9 11/18/2024 1532    " " (H) 11/18/2024 1532        Component Value Date/Time    CALCIUM 8.5 (L) 11/18/2024 1532    ALKPHOS 74 11/18/2024 1532    AST 74 (H) 11/18/2024 1532     (H) 11/18/2024 1532    BILITOT 0.7 11/18/2024 1532    ESTGFRAFRICA >60.0 04/20/2022 0933    EGFRNONAA >60.0 04/20/2022 0933          Lab Results   Component Value Date    CHOL 151 03/26/2024    CHOL 252 (H) 12/09/2023    CHOL 220 (H) 07/28/2023     Lab Results   Component Value Date    HDL 30 (L) 03/26/2024    HDL 34 (L) 12/09/2023    HDL 34 (L) 07/28/2023     Lab Results   Component Value Date    LDLCALC 96.4 03/26/2024    LDLCALC 169.4 (H) 12/09/2023    LDLCALC 116.2 07/28/2023     Lab Results   Component Value Date    TRIG 123 03/26/2024    TRIG 243 (H) 12/09/2023    TRIG 349 (H) 07/28/2023     Lab Results   Component Value Date    CHOLHDL 19.9 (L) 03/26/2024    CHOLHDL 13.5 (L) 12/09/2023    CHOLHDL 15.5 (L) 07/28/2023       Lab Results   Component Value Date    MICALBCREAT 48.8 (H) 03/26/2024     Lab Results   Component Value Date    TSH 0.856 12/09/2023       CrCl cannot be calculated (Unknown ideal weight.).    No results found for: "[id number]"      PHYSICAL EXAMINATION  Constitutional: Appears well, no distress  Respiratory: CTA, even and unlabored.  Cardiovascular: RRR, no murmurs, no carotid bruits.   GI: active bowel sounds, no hernia noted.  Skin: warm and dry; no visible wounds  Neuro: oriented to person, place, time  Feet: appropriate footwear.      DEXCOM G7 DOWNLOAD: Of note, second week of download reflects time off of all DM meds. Fasting glucoses steadily increasing over the past week.  Prandial excursions noted. Rare hypoglycemia.           Goals        HEMOGLOBIN A1C < 7               Assessment/Plan  1. Type 2 diabetes mellitus with microalbuminuria, without long-term current use of insulin  -- Uncontrolled. Glucoses increasing since stopping DM meds. I do not feel that his symptoms are caused by his DM meds, as these have " persisted since he stopped taking. Will plan to resume insulin at this time. We will resume Jardiance once eating normally again in an effort to avoid euglycemic DKA. Will resume Ozempic once appetite improves and nausea ceases. Patient's PCP was notified of his symptoms.   -- resume Basaglar 26 units daily  -- start Humalog correction scale, target 150, ISF 25. Discussed proper timing.  increase Ozempic to 1 mg weekly. Start low and work up.   -- continue Dexcom G7. Notify me if BG remain > 180 after meals.   -- schedule eye exam    2. Dyslipidemia  -- controlled  -- continue atorvastatin   -- lipid panel with RTC   3. Obesity (BMI 30-39.9)  -- increases insulin resistance  -- Body mass index is 31.54 kg/m².   4. Elevated liver enzymes  -- I have notified pt's PCP of symptoms listed above in the setting of elevated LFTs. Will defer to Primary Care for workup of general feeling of being unwell.  -- hepatic function panel in 4 weeks.        FOLLOW UP  Follow up in about 3 months (around 2/20/2025).   Patient instructed to bring BG logs to each follow up   Patient encouraged to call for any BG/medication issues, concerns, or questions.    Orders Placed This Encounter   Procedures    Hemoglobin A1C    Microalbumin/Creatinine Ratio, Urine    Comprehensive Metabolic Panel    Lipid Panel    TSH    Hepatic Function Panel

## 2024-11-20 ENCOUNTER — OFFICE VISIT (OUTPATIENT)
Dept: ENDOCRINOLOGY | Facility: CLINIC | Age: 29
End: 2024-11-20
Payer: COMMERCIAL

## 2024-11-20 ENCOUNTER — TELEPHONE (OUTPATIENT)
Dept: ENDOCRINOLOGY | Facility: CLINIC | Age: 29
End: 2024-11-20

## 2024-11-20 VITALS
HEIGHT: 73 IN | BODY MASS INDEX: 31.68 KG/M2 | DIASTOLIC BLOOD PRESSURE: 60 MMHG | HEART RATE: 94 BPM | WEIGHT: 239.06 LBS | SYSTOLIC BLOOD PRESSURE: 110 MMHG

## 2024-11-20 DIAGNOSIS — R80.9 TYPE 2 DIABETES MELLITUS WITH MICROALBUMINURIA, WITHOUT LONG-TERM CURRENT USE OF INSULIN: Primary | ICD-10-CM

## 2024-11-20 DIAGNOSIS — E11.29 TYPE 2 DIABETES MELLITUS WITH MICROALBUMINURIA, WITHOUT LONG-TERM CURRENT USE OF INSULIN: Primary | ICD-10-CM

## 2024-11-20 DIAGNOSIS — R74.8 ELEVATED LIVER ENZYMES: ICD-10-CM

## 2024-11-20 DIAGNOSIS — E66.9 OBESITY (BMI 30-39.9): ICD-10-CM

## 2024-11-20 DIAGNOSIS — E78.5 DYSLIPIDEMIA: ICD-10-CM

## 2024-11-20 PROCEDURE — 3074F SYST BP LT 130 MM HG: CPT | Mod: CPTII,S$GLB,, | Performed by: NURSE PRACTITIONER

## 2024-11-20 PROCEDURE — 99214 OFFICE O/P EST MOD 30 MIN: CPT | Mod: S$GLB,,, | Performed by: NURSE PRACTITIONER

## 2024-11-20 PROCEDURE — 1159F MED LIST DOCD IN RCRD: CPT | Mod: CPTII,S$GLB,, | Performed by: NURSE PRACTITIONER

## 2024-11-20 PROCEDURE — 3066F NEPHROPATHY DOC TX: CPT | Mod: CPTII,S$GLB,, | Performed by: NURSE PRACTITIONER

## 2024-11-20 PROCEDURE — 3060F POS MICROALBUMINURIA REV: CPT | Mod: CPTII,S$GLB,, | Performed by: NURSE PRACTITIONER

## 2024-11-20 PROCEDURE — 95251 CONT GLUC MNTR ANALYSIS I&R: CPT | Mod: S$GLB,,, | Performed by: NURSE PRACTITIONER

## 2024-11-20 PROCEDURE — 99999 PR PBB SHADOW E&M-EST. PATIENT-LVL III: CPT | Mod: PBBFAC,,, | Performed by: NURSE PRACTITIONER

## 2024-11-20 PROCEDURE — 3078F DIAST BP <80 MM HG: CPT | Mod: CPTII,S$GLB,, | Performed by: NURSE PRACTITIONER

## 2024-11-20 PROCEDURE — 1160F RVW MEDS BY RX/DR IN RCRD: CPT | Mod: CPTII,S$GLB,, | Performed by: NURSE PRACTITIONER

## 2024-11-20 PROCEDURE — 3051F HG A1C>EQUAL 7.0%<8.0%: CPT | Mod: CPTII,S$GLB,, | Performed by: NURSE PRACTITIONER

## 2024-11-20 PROCEDURE — 3008F BODY MASS INDEX DOCD: CPT | Mod: CPTII,S$GLB,, | Performed by: NURSE PRACTITIONER

## 2024-11-20 RX ORDER — BLOOD-GLUCOSE SENSOR
1 EACH MISCELLANEOUS
Qty: 9 EACH | Refills: 3 | Status: SHIPPED | OUTPATIENT
Start: 2024-11-20 | End: 2025-11-20

## 2024-11-20 RX ORDER — PEN NEEDLE, DIABETIC 30 GX3/16"
NEEDLE, DISPOSABLE MISCELLANEOUS
Qty: 150 EACH | Refills: 6 | Status: SHIPPED | OUTPATIENT
Start: 2024-11-20

## 2024-11-20 RX ORDER — INSULIN LISPRO 100 [IU]/ML
INJECTION, SOLUTION INTRAVENOUS; SUBCUTANEOUS
Qty: 15 ML | Refills: 3 | Status: SHIPPED | OUTPATIENT
Start: 2024-11-20

## 2024-11-20 NOTE — Clinical Note
Dr. Robb Garrison. Please see HPI and ROS in my note. His liver enzymes are also elevated. Would you like for him to come in and see you or Martha? I had hoped to catch you while he was here in case you thought something like monospot would be appropriate so we could do it while he was here. Please let me know what you think and we can help get him scheduled.

## 2024-11-22 ENCOUNTER — LAB VISIT (OUTPATIENT)
Dept: LAB | Facility: HOSPITAL | Age: 29
End: 2024-11-22
Attending: STUDENT IN AN ORGANIZED HEALTH CARE EDUCATION/TRAINING PROGRAM
Payer: COMMERCIAL

## 2024-11-22 ENCOUNTER — OFFICE VISIT (OUTPATIENT)
Dept: FAMILY MEDICINE | Facility: CLINIC | Age: 29
End: 2024-11-22
Payer: COMMERCIAL

## 2024-11-22 VITALS
SYSTOLIC BLOOD PRESSURE: 120 MMHG | OXYGEN SATURATION: 98 % | DIASTOLIC BLOOD PRESSURE: 72 MMHG | BODY MASS INDEX: 31.54 KG/M2 | HEART RATE: 104 BPM | HEIGHT: 73 IN

## 2024-11-22 DIAGNOSIS — R74.01 TRANSAMINITIS: ICD-10-CM

## 2024-11-22 DIAGNOSIS — R52 BODY ACHES: ICD-10-CM

## 2024-11-22 DIAGNOSIS — R50.9 FEVER AND CHILLS: Primary | ICD-10-CM

## 2024-11-22 DIAGNOSIS — R07.9 RIGHT-SIDED CHEST PAIN: ICD-10-CM

## 2024-11-22 DIAGNOSIS — R10.11 RIGHT UPPER QUADRANT ABDOMINAL PAIN: ICD-10-CM

## 2024-11-22 DIAGNOSIS — J02.9 SORE THROAT: ICD-10-CM

## 2024-11-22 DIAGNOSIS — R50.9 FEVER AND CHILLS: ICD-10-CM

## 2024-11-22 DIAGNOSIS — R77.0 LOW SERUM ALBUMIN: ICD-10-CM

## 2024-11-22 DIAGNOSIS — R00.0 TACHYCARDIA: ICD-10-CM

## 2024-11-22 LAB
ALBUMIN SERPL BCP-MCNC: 2.7 G/DL (ref 3.5–5.2)
ALP SERPL-CCNC: 74 U/L (ref 40–150)
ALT SERPL W/O P-5'-P-CCNC: 83 U/L (ref 10–44)
ANION GAP SERPL CALC-SCNC: 7 MMOL/L (ref 8–16)
AST SERPL-CCNC: 76 U/L (ref 10–40)
BASOPHILS # BLD AUTO: 0.11 K/UL (ref 0–0.2)
BASOPHILS NFR BLD: 1.2 % (ref 0–1.9)
BILIRUB DIRECT SERPL-MCNC: 0.3 MG/DL (ref 0.1–0.3)
BILIRUB SERPL-MCNC: 0.7 MG/DL (ref 0.1–1)
BUN SERPL-MCNC: 7 MG/DL (ref 6–20)
CALCIUM SERPL-MCNC: 8.4 MG/DL (ref 8.7–10.5)
CHLORIDE SERPL-SCNC: 101 MMOL/L (ref 95–110)
CO2 SERPL-SCNC: 27 MMOL/L (ref 23–29)
CREAT SERPL-MCNC: 0.8 MG/DL (ref 0.5–1.4)
CTP QC/QA: YES
DIFFERENTIAL METHOD BLD: ABNORMAL
EOSINOPHIL # BLD AUTO: 0.1 K/UL (ref 0–0.5)
EOSINOPHIL NFR BLD: 0.5 % (ref 0–8)
ERYTHROCYTE [DISTWIDTH] IN BLOOD BY AUTOMATED COUNT: 13 % (ref 11.5–14.5)
EST. GFR  (NO RACE VARIABLE): >60 ML/MIN/1.73 M^2
GLUCOSE SERPL-MCNC: 158 MG/DL (ref 70–110)
GLUCOSE SERPL-MCNC: 205 MG/DL (ref 70–110)
HAV IGM SERPL QL IA: NORMAL
HBV CORE IGM SERPL QL IA: NORMAL
HBV SURFACE AG SERPL QL IA: NORMAL
HCT VFR BLD AUTO: 41.8 % (ref 40–54)
HCV AB SERPL QL IA: NORMAL
HETEROPH AB SER QL: NEGATIVE
HETEROPH AB SERPL QL IA: NEGATIVE
HGB BLD-MCNC: 13.1 G/DL (ref 14–18)
IMM GRANULOCYTES # BLD AUTO: 0.03 K/UL (ref 0–0.04)
IMM GRANULOCYTES NFR BLD AUTO: 0.3 % (ref 0–0.5)
LIPASE SERPL-CCNC: 22 U/L (ref 4–60)
LYMPHOCYTES # BLD AUTO: 6.9 K/UL (ref 1–4.8)
LYMPHOCYTES NFR BLD: 75.5 % (ref 18–48)
MAGNESIUM SERPL-MCNC: 2.1 MG/DL (ref 1.6–2.6)
MCH RBC QN AUTO: 27.4 PG (ref 27–31)
MCHC RBC AUTO-ENTMCNC: 31.3 G/DL (ref 32–36)
MCV RBC AUTO: 87 FL (ref 82–98)
MOLECULAR STREP A: NEGATIVE
MONOCYTES # BLD AUTO: 0.4 K/UL (ref 0.3–1)
MONOCYTES NFR BLD: 4.1 % (ref 4–15)
NEUTROPHILS # BLD AUTO: 1.7 K/UL (ref 1.8–7.7)
NEUTROPHILS NFR BLD: 18.4 % (ref 38–73)
NRBC BLD-RTO: 0 /100 WBC
OHS QRS DURATION: 90 MS
OHS QTC CALCULATION: 462 MS
PLATELET # BLD AUTO: 164 K/UL (ref 150–450)
PLATELET BLD QL SMEAR: ABNORMAL
PMV BLD AUTO: 11.7 FL (ref 9.2–12.9)
POC MOLECULAR INFLUENZA A AGN: NEGATIVE
POC MOLECULAR INFLUENZA B AGN: NEGATIVE
POTASSIUM SERPL-SCNC: 3.6 MMOL/L (ref 3.5–5.1)
PREALB SERPL-MCNC: 11 MG/DL (ref 20–43)
PROT SERPL-MCNC: 5.9 G/DL (ref 6–8.4)
RBC # BLD AUTO: 4.78 M/UL (ref 4.6–6.2)
SARS-COV-2 RDRP RESP QL NAA+PROBE: NEGATIVE
SODIUM SERPL-SCNC: 135 MMOL/L (ref 136–145)
TSH SERPL DL<=0.005 MIU/L-ACNC: 1.07 UIU/ML (ref 0.4–4)
WBC # BLD AUTO: 9.13 K/UL (ref 3.9–12.7)

## 2024-11-22 PROCEDURE — 86308 HETEROPHILE ANTIBODY SCREEN: CPT | Performed by: STUDENT IN AN ORGANIZED HEALTH CARE EDUCATION/TRAINING PROGRAM

## 2024-11-22 PROCEDURE — 84443 ASSAY THYROID STIM HORMONE: CPT | Performed by: STUDENT IN AN ORGANIZED HEALTH CARE EDUCATION/TRAINING PROGRAM

## 2024-11-22 PROCEDURE — 80074 ACUTE HEPATITIS PANEL: CPT | Performed by: STUDENT IN AN ORGANIZED HEALTH CARE EDUCATION/TRAINING PROGRAM

## 2024-11-22 PROCEDURE — 83690 ASSAY OF LIPASE: CPT | Performed by: STUDENT IN AN ORGANIZED HEALTH CARE EDUCATION/TRAINING PROGRAM

## 2024-11-22 PROCEDURE — 36415 COLL VENOUS BLD VENIPUNCTURE: CPT | Mod: PN | Performed by: STUDENT IN AN ORGANIZED HEALTH CARE EDUCATION/TRAINING PROGRAM

## 2024-11-22 PROCEDURE — 85025 COMPLETE CBC W/AUTO DIFF WBC: CPT | Performed by: STUDENT IN AN ORGANIZED HEALTH CARE EDUCATION/TRAINING PROGRAM

## 2024-11-22 PROCEDURE — 84134 ASSAY OF PREALBUMIN: CPT | Performed by: STUDENT IN AN ORGANIZED HEALTH CARE EDUCATION/TRAINING PROGRAM

## 2024-11-22 PROCEDURE — 83735 ASSAY OF MAGNESIUM: CPT | Performed by: STUDENT IN AN ORGANIZED HEALTH CARE EDUCATION/TRAINING PROGRAM

## 2024-11-22 PROCEDURE — 82248 BILIRUBIN DIRECT: CPT | Performed by: STUDENT IN AN ORGANIZED HEALTH CARE EDUCATION/TRAINING PROGRAM

## 2024-11-22 PROCEDURE — 99999 PR PBB SHADOW E&M-EST. PATIENT-LVL IV: CPT | Mod: PBBFAC,,, | Performed by: STUDENT IN AN ORGANIZED HEALTH CARE EDUCATION/TRAINING PROGRAM

## 2024-11-22 PROCEDURE — 80053 COMPREHEN METABOLIC PANEL: CPT | Performed by: STUDENT IN AN ORGANIZED HEALTH CARE EDUCATION/TRAINING PROGRAM

## 2024-11-22 NOTE — PROGRESS NOTES
Plan:     Roberto Carlos was seen today for fatigue, headache, sore throat and chills.    Diagnoses and all orders for this visit:    Fever and chills  -     POCT Influenza A/B Molecular  -     POCT Strep A, Molecular  -     POCT COVID-19 Rapid Screening  -     POCT Infectious mononucleosis antibody  -     CBC Auto Differential; Future  -     TSH; Future  -     HETEROPHILE AB SCREEN; Future  -     CT Abdomen Pelvis W Wo Contrast; Future    Sore throat  -     POCT Influenza A/B Molecular  -     POCT Strep A, Molecular  -     POCT COVID-19 Rapid Screening  -     POCT Infectious mononucleosis antibody  -     CBC Auto Differential; Future  -     TSH; Future  -     HETEROPHILE AB SCREEN; Future    Body aches  -     POCT Influenza A/B Molecular  -     POCT Strep A, Molecular  -     POCT COVID-19 Rapid Screening  -     POCT Infectious mononucleosis antibody  -     CBC Auto Differential; Future  -     TSH; Future  -     HETEROPHILE AB SCREEN; Future    Transaminitis  -     CBC Auto Differential; Future  -     Comprehensive Metabolic Panel; Future  -     Magnesium; Future  -     HEPATITIS PANEL, ACUTE; Future  -     BILIRUBIN, DIRECT; Future  -     CT Abdomen Pelvis W Wo Contrast; Future    Low serum albumin  -     PREALBUMIN; Future    Right-sided chest pain  -     IN OFFICE EKG 12-LEAD (to Muse)    Tachycardia  -     CBC Auto Differential; Future  -     Comprehensive Metabolic Panel; Future  -     Magnesium; Future  -     TSH; Future  -     IN OFFICE EKG 12-LEAD (to Muse)  -     CT Abdomen Pelvis W Wo Contrast; Future    Right upper quadrant abdominal pain  -     Comprehensive Metabolic Panel; Future  -     Magnesium; Future  -     HEPATITIS PANEL, ACUTE; Future  -     LIPASE; Future  -     BILIRUBIN, DIRECT; Future  -     CT Abdomen Pelvis W Wo Contrast; Future    EKG w/ NSR. COVID/strep/flu negative. POCT mono negative.     Further workup/treatment pending results of test ordered as noted above. Advised pt to go to nearest ED if  symptoms worsen or any new/ concerning symptoms develop. Pt agreeable to plan, expressed understanding, all questions answered.     Follow-up if symptoms worsen or fail to improve.    Kylah Zamudio MD  11/22/2024    Subjective:       Patient ID: Roberto Carlos Souza is a 29 y.o. male    Chief Complaint:  Not feeling well x2 weeks    HPI  29 y.o. male with a PMHx as documented below presents to clinic today for the following:     He reports that symptoms first started about 2 weeks ago. Sick contacts include: none. No recent travel. No new environmental exposures.     Onset of symptoms loosely associated with switching insulin to Basaglar due to insurance requirements - unclear if this is related or coincidence.     Pt reports holding Ozempic since symptom onset, as he was unsure if his symptoms could be related to medication side effects.     He has been having subjective fever, chills, fatigue, muscle aches, sore throat, and dry cough. He denies having any headache, ear pain/pressure, sinus pressure, congestion, rhinorrhea, loss of taste/smell, productive cough, wheezing, SOB, nausea, and vomiting.    Additional symptoms include right-sided chest pain/RUQ abdominal discomfort described as 'squeezing' pain. Paresthesias of lower extremities starting every evening around 5 PM. No appetite. Sore throat. Chills, body aches. Mild dry cough. Pt endorses intermittent constipation/diarrhea w/ trace blood (baseline + Hx of hemorrhoids).     Recent CMP (11/18/24) w/ elevated AST/ALT (74/103).    Of note, s/p ED visit on 10/26/24 for evaluation of similar symptoms, but also with +N/V and epigastric discomfort. He was tachycardic during this encounter as well. Workup showed elevated total bilirubin 1.9 but normal/negative abdominal US, EKG wnl, trop negative. He was given IVF and anti-emetics and discharged w/ plan to follow-up with PCP.    Past Medical History:   Diagnosis Date    Anxiety     Diabetes mellitus, type 2 2015     "Dyslipidemia     Hypertension     Previously on lisinopril    Obesity       ROS completed and negative unless otherwise mentioned above in HPI.    Objective:      Vitals:    11/22/24 0856   BP: 120/72   Patient Position: Sitting   Pulse: 104   SpO2: 98%   Height: 6' 1" (1.854 m)     Physical Exam  Vitals reviewed.   Constitutional:       General: He is not in acute distress.  HENT:      Head: Normocephalic and atraumatic.      Right Ear: Tympanic membrane, ear canal and external ear normal. There is no impacted cerumen.      Left Ear: Tympanic membrane, ear canal and external ear normal. There is no impacted cerumen.      Nose: No congestion or rhinorrhea.      Mouth/Throat:      Mouth: Mucous membranes are moist.      Pharynx: Oropharynx is clear. Posterior oropharyngeal erythema present. No oropharyngeal exudate.   Eyes:      Extraocular Movements: Extraocular movements intact.      Conjunctiva/sclera: Conjunctivae normal.      Pupils: Pupils are equal, round, and reactive to light.   Cardiovascular:      Rate and Rhythm: Normal rate.      Heart sounds: Normal heart sounds.   Pulmonary:      Effort: Pulmonary effort is normal. No respiratory distress.      Breath sounds: Normal breath sounds. No stridor. No wheezing, rhonchi or rales.   Abdominal:      Palpations: Abdomen is soft. There is no hepatomegaly or splenomegaly.      Tenderness: There is abdominal tenderness (mild) in the right upper quadrant. There is no guarding or rebound. Positive signs include Natarajan's sign.   Skin:     Coloration: Skin is pale.   Neurological:      General: No focal deficit present.      Mental Status: He is alert and oriented to person, place, and time. Mental status is at baseline.        Assessment:       1. Fever and chills    2. Sore throat    3. Body aches    4. Transaminitis    5. Low serum albumin    6. Right-sided chest pain    7. Tachycardia    8. Right upper quadrant abdominal pain        Kylah Zamudio MD  [facility]  Office: [phone]   Fax: [phone]  11/22/2024      Disclaimer: This note was partly generated using dictation software which may occasionally result in transcription errors.     Total time spend on encounter: 40-54 minutes. This includes face to face time and non-face to face time preparing to see the patient (eg, review of tests), obtaining and/or reviewing separately obtained history, documenting clinical information in the electronic or other health record, independently interpreting results and communicating results to the patient/family/caregiver, or care coordinator.

## 2024-11-25 ENCOUNTER — PATIENT MESSAGE (OUTPATIENT)
Dept: FAMILY MEDICINE | Facility: CLINIC | Age: 29
End: 2024-11-25
Payer: COMMERCIAL

## 2024-11-25 DIAGNOSIS — R16.1 SPLENOMEGALY: Primary | ICD-10-CM

## 2024-11-26 ENCOUNTER — LAB VISIT (OUTPATIENT)
Dept: LAB | Facility: HOSPITAL | Age: 29
End: 2024-11-26
Attending: STUDENT IN AN ORGANIZED HEALTH CARE EDUCATION/TRAINING PROGRAM
Payer: COMMERCIAL

## 2024-11-26 DIAGNOSIS — R16.1 SPLENOMEGALY: ICD-10-CM

## 2024-11-26 LAB
BASOPHILS # BLD AUTO: 0.07 K/UL (ref 0–0.2)
BASOPHILS NFR BLD: 1.1 % (ref 0–1.9)
CCP AB SER IA-ACNC: 1.5 U/ML
CRP SERPL-MCNC: 6.4 MG/L (ref 0–8.2)
DIFFERENTIAL METHOD BLD: ABNORMAL
EOSINOPHIL # BLD AUTO: 0.1 K/UL (ref 0–0.5)
EOSINOPHIL NFR BLD: 0.9 % (ref 0–8)
ERYTHROCYTE [DISTWIDTH] IN BLOOD BY AUTOMATED COUNT: 13.2 % (ref 11.5–14.5)
ERYTHROCYTE [SEDIMENTATION RATE] IN BLOOD BY PHOTOMETRIC METHOD: 27 MM/HR (ref 0–23)
FERRITIN SERPL-MCNC: 1144 NG/ML (ref 20–300)
HAPTOGLOB SERPL-MCNC: 151 MG/DL (ref 30–250)
HCT VFR BLD AUTO: 38.9 % (ref 40–54)
HGB BLD-MCNC: 12.3 G/DL (ref 14–18)
HIV 1+2 AB+HIV1 P24 AG SERPL QL IA: NORMAL
IMM GRANULOCYTES # BLD AUTO: 0.01 K/UL (ref 0–0.04)
IMM GRANULOCYTES NFR BLD AUTO: 0.2 % (ref 0–0.5)
LDH SERPL L TO P-CCNC: 373 U/L (ref 110–260)
LYMPHOCYTES # BLD AUTO: 4.9 K/UL (ref 1–4.8)
LYMPHOCYTES NFR BLD: 74.4 % (ref 18–48)
MCH RBC QN AUTO: 27.5 PG (ref 27–31)
MCHC RBC AUTO-ENTMCNC: 31.6 G/DL (ref 32–36)
MCV RBC AUTO: 87 FL (ref 82–98)
MONOCYTES # BLD AUTO: 0.3 K/UL (ref 0.3–1)
MONOCYTES NFR BLD: 4.4 % (ref 4–15)
NEUTROPHILS # BLD AUTO: 1.3 K/UL (ref 1.8–7.7)
NEUTROPHILS NFR BLD: 19 % (ref 38–73)
NRBC BLD-RTO: 0 /100 WBC
PATH REV BLD -IMP: NORMAL
PLATELET # BLD AUTO: 185 K/UL (ref 150–450)
PMV BLD AUTO: 10.8 FL (ref 9.2–12.9)
RBC # BLD AUTO: 4.47 M/UL (ref 4.6–6.2)
RETICS/RBC NFR AUTO: 2.9 % (ref 0.4–2)
RHEUMATOID FACT SERPL-ACNC: <13 IU/ML (ref 0–15)
WBC # BLD AUTO: 6.55 K/UL (ref 3.9–12.7)

## 2024-11-26 PROCEDURE — 86235 NUCLEAR ANTIGEN ANTIBODY: CPT | Mod: 59 | Performed by: STUDENT IN AN ORGANIZED HEALTH CARE EDUCATION/TRAINING PROGRAM

## 2024-11-26 PROCEDURE — 86645 CMV ANTIBODY IGM: CPT | Performed by: STUDENT IN AN ORGANIZED HEALTH CARE EDUCATION/TRAINING PROGRAM

## 2024-11-26 PROCEDURE — 85025 COMPLETE CBC W/AUTO DIFF WBC: CPT | Performed by: STUDENT IN AN ORGANIZED HEALTH CARE EDUCATION/TRAINING PROGRAM

## 2024-11-26 PROCEDURE — 36415 COLL VENOUS BLD VENIPUNCTURE: CPT | Mod: PO | Performed by: STUDENT IN AN ORGANIZED HEALTH CARE EDUCATION/TRAINING PROGRAM

## 2024-11-26 PROCEDURE — 85652 RBC SED RATE AUTOMATED: CPT | Performed by: STUDENT IN AN ORGANIZED HEALTH CARE EDUCATION/TRAINING PROGRAM

## 2024-11-26 PROCEDURE — 86480 TB TEST CELL IMMUN MEASURE: CPT | Performed by: STUDENT IN AN ORGANIZED HEALTH CARE EDUCATION/TRAINING PROGRAM

## 2024-11-26 PROCEDURE — 86146 BETA-2 GLYCOPROTEIN ANTIBODY: CPT | Performed by: STUDENT IN AN ORGANIZED HEALTH CARE EDUCATION/TRAINING PROGRAM

## 2024-11-26 PROCEDURE — 87389 HIV-1 AG W/HIV-1&-2 AB AG IA: CPT | Performed by: STUDENT IN AN ORGANIZED HEALTH CARE EDUCATION/TRAINING PROGRAM

## 2024-11-26 PROCEDURE — 83010 ASSAY OF HAPTOGLOBIN QUANT: CPT | Performed by: STUDENT IN AN ORGANIZED HEALTH CARE EDUCATION/TRAINING PROGRAM

## 2024-11-26 PROCEDURE — 86235 NUCLEAR ANTIGEN ANTIBODY: CPT | Performed by: STUDENT IN AN ORGANIZED HEALTH CARE EDUCATION/TRAINING PROGRAM

## 2024-11-26 PROCEDURE — 86225 DNA ANTIBODY NATIVE: CPT | Performed by: STUDENT IN AN ORGANIZED HEALTH CARE EDUCATION/TRAINING PROGRAM

## 2024-11-26 PROCEDURE — 86880 COOMBS TEST DIRECT: CPT | Performed by: STUDENT IN AN ORGANIZED HEALTH CARE EDUCATION/TRAINING PROGRAM

## 2024-11-26 PROCEDURE — 83615 LACTATE (LD) (LDH) ENZYME: CPT | Performed by: STUDENT IN AN ORGANIZED HEALTH CARE EDUCATION/TRAINING PROGRAM

## 2024-11-26 PROCEDURE — 86200 CCP ANTIBODY: CPT | Performed by: STUDENT IN AN ORGANIZED HEALTH CARE EDUCATION/TRAINING PROGRAM

## 2024-11-26 PROCEDURE — 82728 ASSAY OF FERRITIN: CPT | Performed by: STUDENT IN AN ORGANIZED HEALTH CARE EDUCATION/TRAINING PROGRAM

## 2024-11-26 PROCEDURE — 85045 AUTOMATED RETICULOCYTE COUNT: CPT | Performed by: STUDENT IN AN ORGANIZED HEALTH CARE EDUCATION/TRAINING PROGRAM

## 2024-11-26 PROCEDURE — 86431 RHEUMATOID FACTOR QUANT: CPT | Performed by: STUDENT IN AN ORGANIZED HEALTH CARE EDUCATION/TRAINING PROGRAM

## 2024-11-26 PROCEDURE — 86140 C-REACTIVE PROTEIN: CPT | Performed by: STUDENT IN AN ORGANIZED HEALTH CARE EDUCATION/TRAINING PROGRAM

## 2024-11-26 PROCEDURE — 86038 ANTINUCLEAR ANTIBODIES: CPT | Performed by: STUDENT IN AN ORGANIZED HEALTH CARE EDUCATION/TRAINING PROGRAM

## 2024-11-26 PROCEDURE — 85060 BLOOD SMEAR INTERPRETATION: CPT | Mod: ,,, | Performed by: PATHOLOGY

## 2024-11-26 PROCEDURE — 86147 CARDIOLIPIN ANTIBODY EA IG: CPT | Mod: 59 | Performed by: STUDENT IN AN ORGANIZED HEALTH CARE EDUCATION/TRAINING PROGRAM

## 2024-11-27 LAB
ANA SER QL IF: NORMAL
ANTI SM ANTIBODY: 0.04 RATIO (ref 0–0.99)
ANTI SM/RNP ANTIBODY: 0.13 RATIO (ref 0–0.99)
ANTI-SM INTERPRETATION: NEGATIVE
ANTI-SM/RNP INTERPRETATION: NEGATIVE
DAT IGG-SP REAG RBC-IMP: NORMAL
DSDNA AB SER-ACNC: NORMAL [IU]/ML
PATH REV BLD -IMP: NORMAL

## 2024-11-28 ENCOUNTER — E-CONSULT (OUTPATIENT)
Dept: HEMATOLOGY/ONCOLOGY | Facility: CLINIC | Age: 29
End: 2024-11-28
Payer: COMMERCIAL

## 2024-11-28 DIAGNOSIS — R16.1 SPLENOMEGALY: Primary | ICD-10-CM

## 2024-11-28 DIAGNOSIS — D64.9 ANEMIA, UNSPECIFIED TYPE: ICD-10-CM

## 2024-11-28 DIAGNOSIS — D64.9 ANEMIA, UNSPECIFIED TYPE: Primary | ICD-10-CM

## 2024-11-28 NOTE — CONSULTS
[facility] - Hematology Oncology  Response for E-Consult     Patient Name: Roberto Carlos Souza  MRN: [id number]  Primary Care Provider: Kylah Zamudio MD   Requesting Provider: Kylah Zamudio MD  E-Consult to Hemonc  Consult performed by: Luis Angel Johnston MD  Consult ordered by: Kylah Zamudio MD  Reason for consult: anemia/splenomegaly          Recommendation:     -Recent labs show normal bili and hapto which goes against hemolytic anemia   -Changing insulin can cause similar reaction but in my experience is very rare  -Symptoms could also be viral  -Path rev CBC suggests White cells show mild absolute lymphocytosis with mild cytologic   atypia.  In conjunction with the radiologic finding of splenomegaly,   consider flow cytometric analysis of peripheral blood for further   evaluation.     -Agree with pathologist that flow cytometry of blood should be next step  -Follow up results and if abnormal or symptoms not improving over the next few weeks please place formal hematology clinic referral        Contingency that warrants a repeat eConsult or referral: standard    Total time of Consultation: 10 minute    I did not speak to the requesting provider verbally about this.     *This eConsult is based on the clinical data available to me and is furnished without benefit of a physical examination. The eConsult will need to be interpreted in light of any clinical issues or changes in patient status not available to me at the time of filing this eConsults. Significant changes in patient condition or level of acuity should result in immediate formal consultation and reevaluation. Please alert me if you have further questions.    Thank you for this eConsult referral.     Luis Angel Johnston MD  [facility] - Hematology Oncology

## 2024-11-29 ENCOUNTER — PATIENT MESSAGE (OUTPATIENT)
Dept: FAMILY MEDICINE | Facility: CLINIC | Age: 29
End: 2024-11-29
Payer: COMMERCIAL

## 2024-11-29 DIAGNOSIS — D72.820 LYMPHOCYTOSIS: Primary | ICD-10-CM

## 2024-11-29 DIAGNOSIS — D72.89 ATYPICAL LYMPHOCYTES PRESENT ON PERIPHERAL BLOOD SMEAR: ICD-10-CM

## 2024-11-29 DIAGNOSIS — R16.1 SPLENOMEGALY: ICD-10-CM

## 2024-11-29 LAB
GAMMA INTERFERON BACKGROUND BLD IA-ACNC: 0.14 IU/ML
M TB IFN-G CD4+ BCKGRND COR BLD-ACNC: 0 IU/ML
M TB IFN-G CD4+ BCKGRND COR BLD-ACNC: 0.01 IU/ML
MITOGEN IGNF BCKGRD COR BLD-ACNC: 9.86 IU/ML
TB GOLD PLUS: NEGATIVE

## 2024-11-29 NOTE — PROGRESS NOTES
Diagnoses and all orders for this visit:    Lymphocytosis  -     Flow Cytometry Analysis (Peripheral Blood); Future    Atypical lymphocytes present on peripheral blood smear  -     Flow Cytometry Analysis (Peripheral Blood); Future    Splenomegaly  -     Flow Cytometry Analysis (Peripheral Blood); Future        Kylah Zamudio MD  [facility]  Office: [phone]   Fax: [phone]  11/29/2024

## 2024-11-30 ENCOUNTER — LAB VISIT (OUTPATIENT)
Dept: LAB | Facility: HOSPITAL | Age: 29
End: 2024-11-30
Attending: STUDENT IN AN ORGANIZED HEALTH CARE EDUCATION/TRAINING PROGRAM
Payer: COMMERCIAL

## 2024-11-30 DIAGNOSIS — D72.820 LYMPHOCYTOSIS: ICD-10-CM

## 2024-11-30 DIAGNOSIS — D72.89 ATYPICAL LYMPHOCYTES PRESENT ON PERIPHERAL BLOOD SMEAR: ICD-10-CM

## 2024-11-30 DIAGNOSIS — R16.1 SPLENOMEGALY: ICD-10-CM

## 2024-11-30 PROCEDURE — 88185 FLOWCYTOMETRY/TC ADD-ON: CPT | Mod: 59 | Performed by: PATHOLOGY

## 2024-11-30 PROCEDURE — 88189 FLOWCYTOMETRY/READ 16 & >: CPT | Mod: ,,, | Performed by: PATHOLOGY

## 2024-11-30 PROCEDURE — 88184 FLOWCYTOMETRY/ TC 1 MARKER: CPT | Performed by: PATHOLOGY

## 2024-12-02 ENCOUNTER — OFFICE VISIT (OUTPATIENT)
Dept: PSYCHIATRY | Facility: CLINIC | Age: 29
End: 2024-12-02
Payer: COMMERCIAL

## 2024-12-02 DIAGNOSIS — F33.41 RECURRENT MAJOR DEPRESSIVE DISORDER, IN PARTIAL REMISSION: ICD-10-CM

## 2024-12-02 DIAGNOSIS — F90.0 ADHD (ATTENTION DEFICIT HYPERACTIVITY DISORDER), INATTENTIVE TYPE: Primary | ICD-10-CM

## 2024-12-02 DIAGNOSIS — F41.1 GAD (GENERALIZED ANXIETY DISORDER): ICD-10-CM

## 2024-12-02 LAB
B2 GLYCOPROT1 IGA SER QL: 2.1 U/ML
CARDIOLIPIN IGG SER IA-ACNC: <9.4 GPL (ref 0–14.99)
CARDIOLIPIN IGM SER IA-ACNC: <9.4 MPL (ref 0–12.49)
CMV IGM SERPL IA-ACNC: >240 AU/ML

## 2024-12-02 PROCEDURE — G2211 COMPLEX E/M VISIT ADD ON: HCPCS | Mod: 95,,,

## 2024-12-02 PROCEDURE — 1159F MED LIST DOCD IN RCRD: CPT | Mod: CPTII,95,,

## 2024-12-02 PROCEDURE — 1160F RVW MEDS BY RX/DR IN RCRD: CPT | Mod: CPTII,95,,

## 2024-12-02 PROCEDURE — 3060F POS MICROALBUMINURIA REV: CPT | Mod: CPTII,95,,

## 2024-12-02 PROCEDURE — 3051F HG A1C>EQUAL 7.0%<8.0%: CPT | Mod: CPTII,95,,

## 2024-12-02 PROCEDURE — 99214 OFFICE O/P EST MOD 30 MIN: CPT | Mod: 95,,,

## 2024-12-02 PROCEDURE — 3066F NEPHROPATHY DOC TX: CPT | Mod: CPTII,95,,

## 2024-12-02 RX ORDER — DEXTROAMPHETAMINE SACCHARATE, AMPHETAMINE ASPARTATE, DEXTROAMPHETAMINE SULFATE AND AMPHETAMINE SULFATE 2.5; 2.5; 2.5; 2.5 MG/1; MG/1; MG/1; MG/1
10 TABLET ORAL 2 TIMES DAILY
Qty: 60 TABLET | Refills: 0 | Status: SHIPPED | OUTPATIENT
Start: 2024-12-02 | End: 2025-01-01

## 2024-12-02 NOTE — PROGRESS NOTES
OUTPATIENT PSYCHIATRY FOLLOW UP VISIT    Encounter Date: 12/2/2024    Clinical Status of Patient:  Outpatient (Virtual)  The patient location is: [address] [place] [address]  The patient phone number is: [phone]   Visit type: Virtual visit with synchronous audio and video  Each patient to whom he or she provides medical services by telemedicine is:  (1) informed of the relationship between the practitioner and patient and the respective role of any other health care provider with respect to management of the patient; and (2) notified that he or she may decline to receive medical services by telemedicine and may withdraw from such care at any time.    Chief Complaint:  Roberto Carlos Souza is a 29 y.o. male who presents today for follow-up.  Met with patient.      HISTORY OF PRESENTING ILLNESS:  Roberto Carlos Souza is a 29 y.o. male with history of MDD, MARCOS, and ADHD-IT who presents for follow up appointment.      Plan at last appointment:   Start lisdexamfetamine 30 mg daily for ADHD   Continue sertraline 150 mg daily for mood and anxiety  Continue bupropion  mg q.a.m. for mood   Continue buspirone 5-7.5 mg p.o. t.i.d. p.r.n. anxiety  Offered referral for individual psychotherapy    Psychotropic medication history:   Lexapro      INTERVAL HISTORY:    Pt reports his insurance would not cover lisdexamfetamine so he did not start the medication after our last visit.  He notes continued difficulty maintaining attention and concentration, especially at work.    He reports his mood continues to be low related to the birth of his 2nd child approximately 2 months ago.     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  Denied interval or current suicidal/homicidal thoughts, intent, or plan or NSSI.  Denied other questions and concerns.    Medication side effects: None  Medication adherence: yes    MEDICAL REVIEW OF SYSTEMS:   Pain: Denies any significant chronic or acute pain.  Constitutional: Denies fever or change in appetite.  Cardiovascular: Denies chest pain or exertional dyspnea.  Respiratory: Artemio cough or orthopnea.   GI: Denies abdominal pain, N/V  Neurological: Denies tremor, seizure, or focal weakness.  Psychiatric: See HPI above.    PAST PSYCHIATRIC, MEDICAL, AND SOCIAL HISTORY REVIEWED  The patient's past medical, family and social history have been reviewed and updated as appropriate within the electronic medical record - see encounter notes.    PAST MEDICAL HISTORY:   Past Medical History:   Diagnosis Date    Anxiety     Diabetes mellitus, type 2 2015    Dyslipidemia     Hypertension     Previously on lisinopril    Obesity      Head trauma/Loss of consciousness: denies  Seizures: denies     PAST PSYCHIATRIC HISTORY:  First psych contact: at  for depression and anxiety    Prior hospitalizations: denies  Prior suicide attempts or self-harm: denies  Prior diagnosis: MDD, MARCOS  Prior psychotherapy: individual psychotherapy @ [age], recently with Dr. Nettles     EXAM:  Constitutional  Vitals:  Most recent vital signs were reviewed.   Last 3 sets of VS:      11/18/2024     2:44 PM 11/20/2024     8:47 AM 11/22/2024     8:56 AM   Vitals - 1 value per visit   SYSTOLIC  110 120   DIASTOLIC  60 72   Pulse  94 104   SPO2   98 %   Weight (lb) 236.55 239.09    Weight (kg) 107.3 108.45    Height 6' 1" (1.854 m) 6' 1" (1.854 m) 6' 1" (1.854 m)   BMI (Calculated) 31.2 31.6    Pain Score Seven Six Six      General:  unremarkable, age " appropriate     Musculoskeletal  Muscle Strength/Tone:  No tremors appreciated   Gait & Station:  Unable to assess, Pt seated during virtual visit     Psychiatric  Speech:  no latency; no press   Mood & Affect:  sad  congruent and appropriate   Thought Process:  normal and logical   Associations:  intact   Thought Content:  normal, no suicidality, no homicidality, delusions, or paranoia   Insight:  intact   Judgement: behavior is adequate to circumstances   Orientation:  grossly intact   Memory: intact for content of interview   Language: grossly intact   Attention Span & Concentration:  Intact to interview   Fund of Knowledge:  Not formally tested     SUICIDE RISK ASSESSMENT:  Protective factors: age, gender, no prior attempts, no prior hospitalizations, no ongoing substance abuse, no psychosis, , wife is currently pregnant with their 1st child, denies SI/intent/plan, seeking treatment, access to treatment, future oriented, good primary support, no access to firearms  Risks: ongoing anxiety and depression  Patient is a low immediate and long-term risk considering risk factors    RELEVANT LABS/STUDIES:    Lab Results   Component Value Date    WBC 6.55 11/26/2024    HGB 12.3 (L) 11/26/2024    HCT 38.9 (L) 11/26/2024    MCV 87 11/26/2024     11/26/2024     BMP  Lab Results   Component Value Date     (L) 11/22/2024    K 3.6 11/22/2024     11/22/2024    CO2 27 11/22/2024    BUN 7 11/22/2024    CREATININE 0.8 11/22/2024    CALCIUM 8.4 (L) 11/22/2024    ANIONGAP 7 (L) 11/22/2024    ESTGFRAFRICA >60.0 04/20/2022    EGFRNONAA >60.0 04/20/2022     Lab Results   Component Value Date    ALT 83 (H) 11/22/2024    AST 76 (H) 11/22/2024    ALKPHOS 74 11/22/2024    BILITOT 0.7 11/22/2024     Lab Results   Component Value Date    TSH 1.070 11/22/2024     Lab Results   Component Value Date    HGBA1C 7.8 (H) 11/18/2024       IMPRESSION:    Roberto Carlos Souza is a 29 y.o. male with history of MDD and MARCOS who  presents for follow up appointment.    Status/Progress: Based on the examination today, the patient's problem(s) is/are inadequately controlled.  New problems have not been presented today.   Co-morbidities are not complicating management of the primary condition.  There are no active rule-out diagnoses for this patient at this time.     Patient reports no improvement in symptoms of ADHD after increasing dextroamphetamine-amphetamine XR from 30 to 40 mg q.a.m. at last visit.      Pt was unable to start lisdexamfetamine after last visit as his insurance would not cover the medication.  A trial of dextroamphetamine-amphetamine immediate release will be started.  Should this not be covered by the patient's insurance company, we discussed starting a trial of Concerta.    Risk Parameters:  Patient reports no suicidal ideation  Patient reports no homicidal ideation  Patient reports no self-injurious behavior  Patient reports no violent behavior    DIAGNOSES:    ICD-10-CM ICD-9-CM   1. ADHD (attention deficit hyperactivity disorder), inattentive type  F90.0 314.00   2. Recurrent major depressive disorder, in partial remission  F33.41 296.35   3. MARCOS (generalized anxiety disorder)  F41.1 300.02       PLAN:  Start dextroamphetamine-amphetamine 10 mg b.i.d. for ADHD   Continue sertraline 150 mg daily for mood and anxiety  Continue bupropion  mg q.a.m. for mood   Continue buspirone 5-7.5 mg p.o. t.i.d. p.r.n. anxiety  Offered referral for individual psychotherapy      RETURN TO CLINIC:   1 month      ESTELLA Becerril, PMHNP-BC      30 minutes of total time spent on the encounter, which includes face to face time and non-face to face time preparing to see the patient (eg. review of tests), obtaining and/or reviewing separately obtained history, documenting clinical information in the electronic health record, independently interpreting results (not separately reported), and communicating results to the  patient/family/caregiver, or care coordination (not separately reported).     Visit today included managing the longitudinal care of the patient due to the serious and/or complex managed problem(s) MDD, MARCOS, ADHD.    At this time there are no indications the patient represents an imminent danger to either themselves or others; will continue to manage treatment in the outpatient setting.    I discussed the patient's care with the patient including benefits, alternatives, possible adverse effects of the treatment plan; including the potential for metabolic complications, major organ dysfunction, black box warnings, and contraindications. The opportunity was given for questions/clarification, and after this discussion the above treatment plan was devised through shared decision making. The patient voiced their understanding of the diagnoses and treatments listed above and agreed to the treatment plan. Follow up plan was reviewed with the patient. The patient was advised to call to report any worsening of symptoms or problems with medication.    Supportive therapy and psychoeducation provided. Patient has been given crisis information including Suicide and Crisis Lifeline (call or text: 114). Patient also given instructions to go to the nearest ER or call 911 if unable to remain safe or if the Pt develops thoughts of harming self or others.    Documentation entered by me for this encounter may have been done in part using bulletn. Direct voice recognition transcription software. Garbled syntax, mangled pronouns, and other bizarre constructions may be attributed to that software system.

## 2024-12-03 LAB
FLOW CYTOMETRY ANTIBODIES ANALYZED - BLOOD: NORMAL
FLOW CYTOMETRY COMMENT - BLOOD: NORMAL
FLOW CYTOMETRY INTERPRETATION - BLOOD: NORMAL

## 2024-12-04 ENCOUNTER — TELEPHONE (OUTPATIENT)
Dept: HEMATOLOGY/ONCOLOGY | Facility: CLINIC | Age: 29
End: 2024-12-04
Payer: COMMERCIAL

## 2024-12-04 NOTE — TELEPHONE ENCOUNTER
TC to Shasha in [place] office requesting that this pt be contacted and asap as his cytometry resulted TLGL  Informed her her [place]  She stated she will reach out to patient      TC to pt to advise of above  he acknowledged understanding

## 2024-12-05 ENCOUNTER — TELEPHONE (OUTPATIENT)
Dept: HEMATOLOGY/ONCOLOGY | Facility: CLINIC | Age: 29
End: 2024-12-05
Payer: COMMERCIAL

## 2024-12-05 ENCOUNTER — PATIENT MESSAGE (OUTPATIENT)
Dept: HEMATOLOGY/ONCOLOGY | Facility: CLINIC | Age: 29
End: 2024-12-05
Payer: COMMERCIAL

## 2024-12-05 DIAGNOSIS — C95.90 LEUKEMIA CONSULTATION: Primary | ICD-10-CM

## 2024-12-05 DIAGNOSIS — Z71.89 LEUKEMIA CONSULTATION: Primary | ICD-10-CM

## 2024-12-05 NOTE — TELEPHONE ENCOUNTER
----- Message from Kaylee German MD sent at 12/4/2024  9:04 PM CST -----  Regarding: RE: New dx CLL  Sure  ----- Message -----  From: Shasha No RN  Sent: 12/4/2024   8:33 AM CST  To: Kaylee German MD  Subject: New dx CLL                                       Good morning Dr. German,     I received a call from Dr. Johnston's office on the [facility] asking me to get Mr. Souza scheduled over here in [facility]. New dx suspected CLL. I'd like to get him in here to see you this Friday. Are you OK with this plan?     Thank you,   Shasha

## 2024-12-05 NOTE — NURSING
"Rec'd phone call on 12/4 from DEMARCO Cruz RN with Dr. Johnston's office. Per DEMARCO Cruz, recent blood work highly indicative of leukemia. Dr. Johnston requesting to have Mr. Souza establish care with a local oncologist for further workup.     Chart reviewed. Flow cytometry resulted 11/30/24 indicates T-LGLs. Patient also has splenomegaly noted on imaging.     Discussed with Dr. German via secure chat. Dr. German has agreed to assume care of Mr. Souza.     Referral from Dr. Zamudio placed. Scheduled 1st available NP apt with Dr. German on Friday 2/6.     Spoke with Mr. Souza via telephone. Introduced myself and explained my role as oncology navigator. Discussed hemonc referral and Dr. Zamudio's consult with Dr. Johnston. Explained that recent lab work indicates some blood abnormality that needs further workup. Reviewed date/time/location of scheduled apt with Dr. German. Mr. Souza confirmed this visit.     My contact information previously sent via [employer]. Instructed Mr. Souza to contact me with any questions or concerns prior to tomorrow's visit. He verbalized understanding and thanked me for my call.     Will continue to follow for [facility] navigation needs.     Oncology Navigation   Intake  Date of Diagnosis: 11/30/24  Cancer Type: Leukemia  Type of Referral: Internal  Date of Referral: 12/04/24  Initial Nurse Navigator Contact: 12/05/24  Referral to Initial Contact Timeline (days): 1  First Appointment Available: 12/06/24  Appointment Date: 12/06/24  First Available Date vs. Scheduled Date (days): 0  Reason if booked > 7 days after scheduling: Transfer of care     Treatment  Current Status: Staging work-up    Medical Oncologist: Dr. Kaylee German  Consult Date: 12/06/24    Procedures: CT; Ultrasound  CT Schedule Date: 11/22/24  Ultrasound Schedule Date: 10/26/24    Support Systems: Spouse/significant other  Barriers of Care: Barriers to Care "Assessment completed-no barriers noted"     Acuity  Comorbidities in Medical History: " 2  Hospitalization Within the Past Month: 0   Needed: 0  Support: 0  Verbalizes Financial Concerns: 0  Transportation: 0  History of noncompliance/frequent no shows and cancellations: 0  Verbalizes the need for more education: 1  Navigation Acuity: 3     Follow Up  Follow up in about 1 day (around 12/6/2024).

## 2024-12-06 ENCOUNTER — LAB VISIT (OUTPATIENT)
Dept: LAB | Facility: HOSPITAL | Age: 29
End: 2024-12-06
Attending: INTERNAL MEDICINE
Payer: COMMERCIAL

## 2024-12-06 ENCOUNTER — OFFICE VISIT (OUTPATIENT)
Dept: HEMATOLOGY/ONCOLOGY | Facility: CLINIC | Age: 29
End: 2024-12-06
Payer: COMMERCIAL

## 2024-12-06 VITALS
OXYGEN SATURATION: 96 % | TEMPERATURE: 97 F | RESPIRATION RATE: 16 BRPM | SYSTOLIC BLOOD PRESSURE: 130 MMHG | HEIGHT: 73 IN | HEART RATE: 79 BPM | WEIGHT: 232.13 LBS | DIASTOLIC BLOOD PRESSURE: 70 MMHG | BODY MASS INDEX: 30.76 KG/M2

## 2024-12-06 DIAGNOSIS — K76.0 FATTY LIVER: ICD-10-CM

## 2024-12-06 DIAGNOSIS — C91.Z0 T-CELL LARGE GRANULAR LYMPHOCYTIC LEUKEMIA: Primary | ICD-10-CM

## 2024-12-06 DIAGNOSIS — D64.9 NORMOCYTIC ANEMIA: ICD-10-CM

## 2024-12-06 DIAGNOSIS — C91.Z0 T-CELL LARGE GRANULAR LYMPHOCYTIC LEUKEMIA: ICD-10-CM

## 2024-12-06 DIAGNOSIS — Z71.89 LEUKEMIA CONSULTATION: ICD-10-CM

## 2024-12-06 DIAGNOSIS — R16.1 SPLENOMEGALY: ICD-10-CM

## 2024-12-06 DIAGNOSIS — D70.8 OTHER NEUTROPENIA: ICD-10-CM

## 2024-12-06 DIAGNOSIS — C95.90 LEUKEMIA CONSULTATION: ICD-10-CM

## 2024-12-06 LAB
ALBUMIN SERPL BCP-MCNC: 3.8 G/DL (ref 3.5–5.2)
ALP SERPL-CCNC: 66 U/L (ref 40–150)
ALT SERPL W/O P-5'-P-CCNC: 53 U/L (ref 10–44)
ANION GAP SERPL CALC-SCNC: 11 MMOL/L (ref 8–16)
AST SERPL-CCNC: 38 U/L (ref 10–40)
BASOPHILS # BLD AUTO: 0.05 K/UL (ref 0–0.2)
BASOPHILS NFR BLD: 0.8 % (ref 0–1.9)
BILIRUB SERPL-MCNC: 0.7 MG/DL (ref 0.1–1)
BUN SERPL-MCNC: 12 MG/DL (ref 6–20)
CALCIUM SERPL-MCNC: 9.2 MG/DL (ref 8.7–10.5)
CHLORIDE SERPL-SCNC: 104 MMOL/L (ref 95–110)
CO2 SERPL-SCNC: 24 MMOL/L (ref 23–29)
CREAT SERPL-MCNC: 0.8 MG/DL (ref 0.5–1.4)
DIFFERENTIAL METHOD BLD: ABNORMAL
EOSINOPHIL # BLD AUTO: 0.1 K/UL (ref 0–0.5)
EOSINOPHIL NFR BLD: 1.6 % (ref 0–8)
ERYTHROCYTE [DISTWIDTH] IN BLOOD BY AUTOMATED COUNT: 13.4 % (ref 11.5–14.5)
EST. GFR  (NO RACE VARIABLE): >60 ML/MIN/1.73 M^2
FERRITIN SERPL-MCNC: 564 NG/ML (ref 20–300)
FOLATE SERPL-MCNC: 11.8 NG/ML (ref 4–24)
GLUCOSE SERPL-MCNC: 153 MG/DL (ref 70–110)
HCT VFR BLD AUTO: 45.3 % (ref 40–54)
HGB BLD-MCNC: 14.8 G/DL (ref 14–18)
IGA SERPL-MCNC: 224 MG/DL (ref 40–350)
IGG SERPL-MCNC: 1143 MG/DL (ref 650–1600)
IGM SERPL-MCNC: 192 MG/DL (ref 50–300)
IMM GRANULOCYTES # BLD AUTO: 0.02 K/UL (ref 0–0.04)
IMM GRANULOCYTES NFR BLD AUTO: 0.3 % (ref 0–0.5)
IRON SERPL-MCNC: 63 UG/DL (ref 45–160)
LYMPHOCYTES # BLD AUTO: 3 K/UL (ref 1–4.8)
LYMPHOCYTES NFR BLD: 48.4 % (ref 18–48)
MCH RBC QN AUTO: 27.7 PG (ref 27–31)
MCHC RBC AUTO-ENTMCNC: 32.7 G/DL (ref 32–36)
MCV RBC AUTO: 85 FL (ref 82–98)
MONOCYTES # BLD AUTO: 0.5 K/UL (ref 0.3–1)
MONOCYTES NFR BLD: 8 % (ref 4–15)
NEUTROPHILS # BLD AUTO: 2.6 K/UL (ref 1.8–7.7)
NEUTROPHILS NFR BLD: 40.9 % (ref 38–73)
NRBC BLD-RTO: 0 /100 WBC
PLATELET # BLD AUTO: 232 K/UL (ref 150–450)
PMV BLD AUTO: 8.6 FL (ref 9.2–12.9)
POTASSIUM SERPL-SCNC: 4.5 MMOL/L (ref 3.5–5.1)
PROT SERPL-MCNC: 7.3 G/DL (ref 6–8.4)
RBC # BLD AUTO: 5.35 M/UL (ref 4.6–6.2)
SATURATED IRON: 17 % (ref 20–50)
SODIUM SERPL-SCNC: 139 MMOL/L (ref 136–145)
TOTAL IRON BINDING CAPACITY: 374 UG/DL (ref 250–450)
TRANSFERRIN SERPL-MCNC: 253 MG/DL (ref 200–375)
VIT B12 SERPL-MCNC: 756 PG/ML (ref 210–950)
WBC # BLD AUTO: 6.28 K/UL (ref 3.9–12.7)

## 2024-12-06 PROCEDURE — 3066F NEPHROPATHY DOC TX: CPT | Mod: CPTII,S$GLB,, | Performed by: INTERNAL MEDICINE

## 2024-12-06 PROCEDURE — 86334 IMMUNOFIX E-PHORESIS SERUM: CPT | Performed by: INTERNAL MEDICINE

## 2024-12-06 PROCEDURE — G2211 COMPLEX E/M VISIT ADD ON: HCPCS | Mod: S$GLB,,, | Performed by: INTERNAL MEDICINE

## 2024-12-06 PROCEDURE — 3060F POS MICROALBUMINURIA REV: CPT | Mod: CPTII,S$GLB,, | Performed by: INTERNAL MEDICINE

## 2024-12-06 PROCEDURE — 3075F SYST BP GE 130 - 139MM HG: CPT | Mod: CPTII,S$GLB,, | Performed by: INTERNAL MEDICINE

## 2024-12-06 PROCEDURE — 85025 COMPLETE CBC W/AUTO DIFF WBC: CPT | Mod: PN | Performed by: INTERNAL MEDICINE

## 2024-12-06 PROCEDURE — 82728 ASSAY OF FERRITIN: CPT | Performed by: INTERNAL MEDICINE

## 2024-12-06 PROCEDURE — 99205 OFFICE O/P NEW HI 60 MIN: CPT | Mod: S$GLB,,, | Performed by: INTERNAL MEDICINE

## 2024-12-06 PROCEDURE — 82746 ASSAY OF FOLIC ACID SERUM: CPT | Performed by: INTERNAL MEDICINE

## 2024-12-06 PROCEDURE — 84165 PROTEIN E-PHORESIS SERUM: CPT | Performed by: INTERNAL MEDICINE

## 2024-12-06 PROCEDURE — 84165 PROTEIN E-PHORESIS SERUM: CPT | Mod: 26,,, | Performed by: PATHOLOGY

## 2024-12-06 PROCEDURE — 36415 COLL VENOUS BLD VENIPUNCTURE: CPT | Mod: PN | Performed by: INTERNAL MEDICINE

## 2024-12-06 PROCEDURE — 80053 COMPREHEN METABOLIC PANEL: CPT | Mod: PN | Performed by: INTERNAL MEDICINE

## 2024-12-06 PROCEDURE — 3078F DIAST BP <80 MM HG: CPT | Mod: CPTII,S$GLB,, | Performed by: INTERNAL MEDICINE

## 2024-12-06 PROCEDURE — 99999 PR PBB SHADOW E&M-EST. PATIENT-LVL V: CPT | Mod: PBBFAC,,, | Performed by: INTERNAL MEDICINE

## 2024-12-06 PROCEDURE — 82607 VITAMIN B-12: CPT | Performed by: INTERNAL MEDICINE

## 2024-12-06 PROCEDURE — 3008F BODY MASS INDEX DOCD: CPT | Mod: CPTII,S$GLB,, | Performed by: INTERNAL MEDICINE

## 2024-12-06 PROCEDURE — 82784 ASSAY IGA/IGD/IGG/IGM EACH: CPT | Mod: 59 | Performed by: INTERNAL MEDICINE

## 2024-12-06 PROCEDURE — 84466 ASSAY OF TRANSFERRIN: CPT | Performed by: INTERNAL MEDICINE

## 2024-12-06 PROCEDURE — 3051F HG A1C>EQUAL 7.0%<8.0%: CPT | Mod: CPTII,S$GLB,, | Performed by: INTERNAL MEDICINE

## 2024-12-06 NOTE — PROGRESS NOTES
Subjective:       Name: Roberto Carlos Souza  : 1995  MRN: [id number]    Chief Complaint   Patient presents with    T-LGL        Patient is in clinic with wife    HPI: Roberto Carlos Souza is a 29 y.o. 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  There was no significant increase in circulating blasts and no monoclonal B-cell population.    Labs on 2024 showed  LDH was elevated at 373.  CBC done showed a hemoglobin of 12.3 and a with a normal white count and platelet count.  His absolute lymphocytic count was elevated at 4.9.  His absolute neutrophilic count was 1.3.  Peripheral smear review showed the presence of atypical lymphocyte.  Haptoglobin , rheumatoid factor, hepatitis profile, TSH, HIV, CCP IgG antibody BRYNN cardiolipin antibody anti Smith antibody anti double-stranded DNA antibody beta 2 glycoprotein antibody QuantiFERON gold TB, CMV antibody were normal.  Ferritin was 1144.  ESR was 27.    CT of the abdomen and pelvis done on 2024 showed the presence of significant splenomegaly measuring 19.3 cm.    Family History:  Mother had breast cancer at the age of 43 yo. She underwent genetic testing and it was negative.  Father had colon cancer  at the age of 64 yo.    Oncology History    No history exists.        Past Medical History:   Diagnosis Date    Anxiety     Diabetes mellitus, type 2     Dyslipidemia     Hypertension     Previously on lisinopril    Obesity        Past Surgical History:   Procedure Laterality Date    LASIK Bilateral 2015    SPINE SURGERY      2 disc removed; herniated disc. Lower back. Left leg crush injury       Family History   Problem Relation Name Age of Onset    Diabetes Mother      Breast cancer Mother          breast cancer; dx at early 40's    Diabetes Father       Colon cancer Neg Hx      Glaucoma Neg Hx      Cirrhosis Neg Hx         Social History     Socioeconomic History    Marital status:      Spouse name: Dickson   Occupational History    Occupation: Contractor    Tobacco Use    Smoking status: Former     Types: Cigars     Quit date:      Years since quittin.9     Passive exposure: Past    Smokeless tobacco: Never   Substance and Sexual Activity    Alcohol use: Yes     Comment: social; less then one a week    Drug use: No    Sexual activity: Yes     Partners: Female     Birth control/protection: Condom     Social Drivers of Health     Financial Resource Strain: Low Risk  (2023)    Overall Financial Resource Strain (CARDIA)     Difficulty of Paying Living Expenses: Not very hard   Food Insecurity: Food Insecurity Present (2023)    Hunger Vital Sign     Worried About Running Out of Food in the Last Year: Sometimes true     Ran Out of Food in the Last Year: Never true   Transportation Needs: No Transportation Needs (2023)    PRAPARE - Transportation     Lack of Transportation (Medical): No     Lack of Transportation (Non-Medical): No   Physical Activity: Insufficiently Active (2023)    Exercise Vital Sign     Days of Exercise per Week: 2 days     Minutes of Exercise per Session: 20 min   Stress: No Stress Concern Present (2023)    [employer] [place] [employer] - Occupational Stress Questionnaire     Feeling of Stress : Not at all   Housing Stability: Low Risk  (2023)    Housing Stability Vital Sign     Unable to Pay for Housing in the Last Year: No     Number of Places Lived in the Last Year: 1     Unstable Housing in the Last Year: No       Review of patient's allergies indicates:   Allergen Reactions    Mounjaro [tirzepatide] Other (See Comments)     Patient developed with using Mounjaro, indigestion, belching, abdominal pain, diarrhea, upset stomach and nausea.  Was advised to discontinue the medication;  "please see e-mail addressing the issue..  Above GI complaints were addressed.  Pain was rated about a 6; he was advised to go to the emergency room should it worsen    Metformin      Discontinued with hematochezia; symptoms markedly improved but still persistent; remain off metformin    Rybelsus [semaglutide] Other (See Comments)     abdominal pain, nausea       Answers submitted by the patient for this visit:  Review of Systems Questionnaire (Submitted on 12/6/2024)  appetite change : Yes  unexpected weight change: No  mouth sores: No  visual disturbance: No  cough: No  shortness of breath: Yes  chest pain: Yes  abdominal pain: Yes  diarrhea: Yes  frequency: Yes  back pain: No  rash: No  headaches: No  adenopathy: No  nervous/ anxious: No           Objective:     Vitals:    12/06/24 1415   BP: 130/70   BP Location: Right arm   Patient Position: Sitting   Pulse: 79   Resp: 16   Temp: 97.3 °F (36.3 °C)   TempSrc: Temporal   SpO2: 96%   Weight: 105.3 kg (232 lb 2.3 oz)   Height: 6' 1" (1.854 m)        Physical Exam  Vitals reviewed.   Constitutional:       Appearance: Normal appearance.   HENT:      Head: Normocephalic and atraumatic.   Eyes:      General: No scleral icterus.     Pupils: Pupils are equal, round, and reactive to light.   Cardiovascular:      Rate and Rhythm: Normal rate and regular rhythm.      Pulses: Normal pulses.      Heart sounds: Normal heart sounds.   Pulmonary:      Effort: Pulmonary effort is normal.      Breath sounds: Normal breath sounds.   Abdominal:      General: Bowel sounds are normal. There is no distension.   Musculoskeletal:         General: No swelling.   Lymphadenopathy:      Cervical: No cervical adenopathy.   Skin:     General: Skin is warm.      Findings: No rash.   Neurological:      General: No focal deficit present.      Mental Status: He is alert and oriented to person, place, and time.                Current Outpatient Medications on File Prior to Visit   Medication Sig    " "atorvastatin (LIPITOR) 40 MG tablet Take 1 tablet (40 mg total) by mouth once daily.    BASAGLAR KWIKPEN U-100 INSULIN glargine 100 units/mL SubQ pen Inject 26 Units into the skin once daily.    buPROPion (WELLBUTRIN XL) 300 MG 24 hr tablet Take 1 tablet (300 mg total) by mouth once daily.    DEXCOM G7 SENSOR Cookie 1 Device by Misc.(Non-Drug; Combo Route) route every 10 days.    dextroamphetamine-amphetamine (ADDERALL) 10 mg Tab Take 1 tablet (10 mg total) by mouth 2 (two) times a day.    empagliflozin (JARDIANCE) 25 mg tablet Take 1 tablet (25 mg total) by mouth once daily.    HUMALOG KWIKPEN INSULIN 100 unit/mL pen Use three times daily before meals, per sliding scale. Max TDD 30u    pen needle, diabetic (BD ULTRA-FINE IRA PEN NEEDLE) 32 gauge x 5/32" Ndle Uses 4 daily, on multiple daily insulin injections    semaglutide (OZEMPIC) 2 mg/dose (8 mg/3 mL) PnIj Inject 2 mg into the skin every 7 days.    sertraline (ZOLOFT) 100 MG tablet TAKE 1 TABLET BY MOUTH EVERY DAY     No current facility-administered medications on file prior to visit.       CBC:  Lab Results   Component Value Date    WBC 6.55 11/26/2024    HGB 12.3 (L) 11/26/2024    HCT 38.9 (L) 11/26/2024    MCV 87 11/26/2024     11/26/2024         CMP:  Sodium   Date Value Ref Range Status   11/22/2024 135 (L) 136 - 145 mmol/L Final     Potassium   Date Value Ref Range Status   11/22/2024 3.6 3.5 - 5.1 mmol/L Final     Chloride   Date Value Ref Range Status   11/22/2024 101 95 - 110 mmol/L Final     CO2   Date Value Ref Range Status   11/22/2024 27 23 - 29 mmol/L Final     Glucose   Date Value Ref Range Status   11/22/2024 158 (H) 70 - 110 mg/dL Final     BUN   Date Value Ref Range Status   11/22/2024 7 6 - 20 mg/dL Final     Creatinine   Date Value Ref Range Status   11/22/2024 0.8 0.5 - 1.4 mg/dL Final     Calcium   Date Value Ref Range Status   11/22/2024 8.4 (L) 8.7 - 10.5 mg/dL Final     Total Protein   Date Value Ref Range Status   11/22/2024 5.9 " (L) 6.0 - 8.4 g/dL Final     Albumin   Date Value Ref Range Status   11/22/2024 2.7 (L) 3.5 - 5.2 g/dL Final     Total Bilirubin   Date Value Ref Range Status   11/22/2024 0.7 0.1 - 1.0 mg/dL Final     Comment:     For infants and newborns, interpretation of results should be based  on gestational age, weight and in agreement with clinical  observations.    Premature Infant recommended reference ranges:  Up to 24 hours.............<8.0 mg/dL  Up to 48 hours............<12.0 mg/dL  3-5 days..................<15.0 mg/dL  6-29 days.................<15.0 mg/dL       Alkaline Phosphatase   Date Value Ref Range Status   11/22/2024 74 40 - 150 U/L Final     AST   Date Value Ref Range Status   11/22/2024 76 (H) 10 - 40 U/L Final     ALT   Date Value Ref Range Status   11/22/2024 83 (H) 10 - 44 U/L Final     Anion Gap   Date Value Ref Range Status   11/22/2024 7 (L) 8 - 16 mmol/L Final     eGFR if    Date Value Ref Range Status   04/20/2022 >60.0 >60 mL/min/1.73 m^2 Final     eGFR if non    Date Value Ref Range Status   04/20/2022 >60.0 >60 mL/min/1.73 m^2 Final     Comment:     Calculation used to obtain the estimated glomerular filtration  rate (eGFR) is the CKD-EPI equation.          CT Abdomen Pelvis W Wo Contrast  Narrative: EXAMINATION:  CT ABDOMEN PELVIS W WO CONTRAST    CLINICAL HISTORY:  Abdominal pain, acute, nonlocalized;Fever, unspecified    TECHNIQUE:  Low dose axial images, sagittal and coronal reformations were obtained from the lung bases to the pubic symphysis before and following the IV administration of 80 mL of Omnipaque 350.  No oral contrast was given.    Automatic exposure control (AEC) was utilized for dose reduction.    Dose: 1572 mGycm    COMPARISON:  None    FINDINGS:  Lung bases appear clear.  Liver appears normal.  Spleen is enlarged measuring 19.3 cm.  The liver extends to the left abdominal wall.  Pancreas appears normal.  Biliary system appears normal.  The  adrenals are not enlarged.  Kidneys appear normal.  The appendix appears normal.  Impression: Significant splenomegaly.    The liver extends across the abdomen with wide and is most likely enlarged.    Electronically signed by: Martell Crane MD  Date:    11/22/2024  Time:    12:53       ECOG SCORE    1 - Restricted in strenuous activity-ambulatory and able to carry out work of a light nature              Assessment/Plan:       T-LGL:  Large granular lymphocyte (LGL) leukemia is a clonal disease of the large granular lymphocyte characterized by peripheral blood and marrow lymphocytic infiltration with LGLs, splenomegaly, and cytopenias, most commonly neutropenia.T cell LGL leukemia accounts for approximately 2 to 5 percent of the chronic lymphoproliferative disorders in [place].  A prominent feature of LGL leukemia is an association with other diseases in 40 percent of cases, particularly rheumatoid arthritis post infectious and other hematologic disorders.  For newly diagnosed patients with asymptomatic LGL leukemia, we suggest observation rather than immediate treatment.   The main goal of treatment for patients with LGL leukemia is the relief of symptoms, and especially a reduction in infections associated with neutropenia. Although intuitively sensible, it remains unclear whether achievement of hematologic improvement prolongs survival.  Treatment is indicated for relief of symptoms or life-threatening peripheral blood cytopenias:  ?Severe neutropenia (absolute neutrophil count <500/microL)  ?Moderate neutropenia (absolute neutrophil count <1000/microL) with recurrent infections  ?Symptomatic or transfusion dependent anemia  ?Severe thrombocytopenia (<50,000/microL), which is a very rare event  ?Associated autoimmune conditions (eg, rheumatoid arthritis) requiring therapy    Currently the patient is asymptomatic and he will continue to be followed clinically as well with repeat blood workup.    He will be  seen back again in 2 weeks for a follow up visit with repeat CBC CMP     Normocytic anemia.    The patient will have blood workup drawn today for CBC and iron studies vitamin B12 and folate.     Positive IgM CMV antibody.    We will send a PCR for CMV to assess if the patient has an acute infection.      Fatty liver and splenomegaly.    The patient will be referred to be assessed by a hepatologist.    65 minutes of total time spent on the encounter, which includes face to face time and non-face to face time preparing to see the patient (eg, review of tests), obtaining and/or reviewing separately obtained history, documenting clinical information in the electronic or other health record, independently interpreting results (not separately reported) and communicating results to the patient/family/caregiver, or care coordination (not separately reported).     Med Onc Chart Routing      Follow up with physician 2 weeks. to discuss labs drawn today. Referral to hepatology ( virtual visit)   Follow up with SARAH    Infusion scheduling note    Injection scheduling note    Labs CBC, CMP, ferritin, iron and TIBC, SPEP, vitamin B12 and folate   Scheduling:  Preferred lab:  Lab interval:  Ig, PCR CMV   Imaging    Pharmacy appointment    Other referrals                   Plan was discussed with the patient at length, and he verbalized understanding. Roberto Carlos was given an opportunity to ask questions that were answered to his satisfaction, and he was advised to call in the interval if any problems or questions arise.  Signed:  Kaylee German MD   Hematology and Oncology  [facility] - HEMATOLOGY ONCOLOGY  [facility], [place]

## 2024-12-09 ENCOUNTER — TELEPHONE (OUTPATIENT)
Dept: ENDOCRINOLOGY | Facility: CLINIC | Age: 29
End: 2024-12-09
Payer: COMMERCIAL

## 2024-12-09 LAB
ALBUMIN SERPL ELPH-MCNC: 4.02 G/DL (ref 3.35–5.55)
ALPHA1 GLOB SERPL ELPH-MCNC: 0.34 G/DL (ref 0.17–0.41)
ALPHA2 GLOB SERPL ELPH-MCNC: 0.71 G/DL (ref 0.43–0.99)
B-GLOBULIN SERPL ELPH-MCNC: 0.84 G/DL (ref 0.5–1.1)
CMV DNA SPEC QL NAA+PROBE: ABNORMAL
CYTOMEGALOVIRUS LOG (IU/ML): 1.64 LOGIU/ML
CYTOMEGALOVIRUS PCR, QUANT: 44 IU/ML
GAMMA GLOB SERPL ELPH-MCNC: 1.09 G/DL (ref 0.67–1.58)
INTERPRETATION SERPL IFE-IMP: NORMAL
PROT SERPL-MCNC: 7 G/DL (ref 6–8.4)

## 2024-12-09 NOTE — TELEPHONE ENCOUNTER
Please call pt and let him know that he is scheduled this month for repeat liver enzymes (previously elevated). I see that he just saw Oncology. He is in very good hands with Dr. German. Part of her lab orders included his liver enzymes, which are trending down. I don't think it's necessary to do my lab on 12/18. Please cancel

## 2024-12-10 ENCOUNTER — PATIENT MESSAGE (OUTPATIENT)
Dept: HEMATOLOGY/ONCOLOGY | Facility: CLINIC | Age: 29
End: 2024-12-10
Payer: COMMERCIAL

## 2024-12-10 LAB — PATHOLOGIST INTERPRETATION SPE: NORMAL

## 2024-12-14 DIAGNOSIS — E11.29 TYPE 2 DIABETES MELLITUS WITH MICROALBUMINURIA, WITHOUT LONG-TERM CURRENT USE OF INSULIN: ICD-10-CM

## 2024-12-14 DIAGNOSIS — R80.9 TYPE 2 DIABETES MELLITUS WITH MICROALBUMINURIA, WITHOUT LONG-TERM CURRENT USE OF INSULIN: ICD-10-CM

## 2024-12-16 RX ORDER — SEMAGLUTIDE 2.68 MG/ML
2 INJECTION, SOLUTION SUBCUTANEOUS
Qty: 3 ML | Refills: 6 | Status: SHIPPED | OUTPATIENT
Start: 2024-12-16 | End: 2025-12-16

## 2024-12-24 ENCOUNTER — OFFICE VISIT (OUTPATIENT)
Dept: HEMATOLOGY/ONCOLOGY | Facility: CLINIC | Age: 29
End: 2024-12-24
Payer: COMMERCIAL

## 2024-12-24 VITALS
DIASTOLIC BLOOD PRESSURE: 71 MMHG | HEIGHT: 73 IN | HEART RATE: 87 BPM | RESPIRATION RATE: 16 BRPM | OXYGEN SATURATION: 97 % | TEMPERATURE: 97 F | WEIGHT: 231.94 LBS | BODY MASS INDEX: 30.74 KG/M2 | SYSTOLIC BLOOD PRESSURE: 119 MMHG

## 2024-12-24 DIAGNOSIS — D64.9 NORMOCYTIC ANEMIA: ICD-10-CM

## 2024-12-24 DIAGNOSIS — C91.Z0 T-CELL LARGE GRANULAR LYMPHOCYTIC LEUKEMIA: Primary | ICD-10-CM

## 2024-12-24 PROCEDURE — 1159F MED LIST DOCD IN RCRD: CPT | Mod: CPTII,S$GLB,, | Performed by: INTERNAL MEDICINE

## 2024-12-24 PROCEDURE — 3051F HG A1C>EQUAL 7.0%<8.0%: CPT | Mod: CPTII,S$GLB,, | Performed by: INTERNAL MEDICINE

## 2024-12-24 PROCEDURE — 99999 PR PBB SHADOW E&M-EST. PATIENT-LVL IV: CPT | Mod: PBBFAC,,, | Performed by: INTERNAL MEDICINE

## 2024-12-24 PROCEDURE — 3074F SYST BP LT 130 MM HG: CPT | Mod: CPTII,S$GLB,, | Performed by: INTERNAL MEDICINE

## 2024-12-24 PROCEDURE — G2211 COMPLEX E/M VISIT ADD ON: HCPCS | Mod: S$GLB,,, | Performed by: INTERNAL MEDICINE

## 2024-12-24 PROCEDURE — 3008F BODY MASS INDEX DOCD: CPT | Mod: CPTII,S$GLB,, | Performed by: INTERNAL MEDICINE

## 2024-12-24 PROCEDURE — 3060F POS MICROALBUMINURIA REV: CPT | Mod: CPTII,S$GLB,, | Performed by: INTERNAL MEDICINE

## 2024-12-24 PROCEDURE — 3066F NEPHROPATHY DOC TX: CPT | Mod: CPTII,S$GLB,, | Performed by: INTERNAL MEDICINE

## 2024-12-24 PROCEDURE — 99214 OFFICE O/P EST MOD 30 MIN: CPT | Mod: S$GLB,,, | Performed by: INTERNAL MEDICINE

## 2024-12-24 PROCEDURE — 3078F DIAST BP <80 MM HG: CPT | Mod: CPTII,S$GLB,, | Performed by: INTERNAL MEDICINE

## 2024-12-24 RX ORDER — MUPIROCIN 20 MG/G
OINTMENT TOPICAL
COMMUNITY
Start: 2024-11-29

## 2024-12-24 NOTE — PROGRESS NOTES
Subjective:       Name: Roberto Carlos Souza  : 1995  MRN: [id number]    Chief Complaint   Patient presents with    Follow-up     T-cell large granular lymphocytic leukemia          Patient is in clinic with wife    HPI: Roberto Carlos Souza is a 29 y.o. male presents to discuss the evaluation done to assess   Follow-up (T-cell large granular lymphocytic leukemia/)    The patient denies CP, cough, SOB, abdominal pain, nausea, vomiting, constipation.  The patient denies fever, chills, night sweats, weight loss, new lumps or bumps, easy bruising or bleeding.        PREVIOUS HEMATOLOGIC WORK-UP:  Blood workup for flow cytometry on 2024 showed the presence of relative neutropenia.  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  There was no significant increase in circulating blasts and no monoclonal B-cell population.    Labs on 2024 showed  LDH was elevated at 373.  CBC done showed a hemoglobin of 12.3 and a with a normal white count and platelet count.  His absolute lymphocytic count was elevated at 4.9.  His absolute neutrophilic count was 1.3.  Peripheral smear review showed the presence of atypical lymphocyte.  Haptoglobin , rheumatoid factor, hepatitis profile, TSH, HIV, CCP IgG antibody BRYNN cardiolipin antibody anti Smith antibody anti double-stranded DNA antibody beta 2 glycoprotein antibody QuantiFERON gold TB, CMV antibody were normal.  Ferritin was 1144.  ESR was 27.    CT of the abdomen and pelvis done on 2024 showed the presence of significant splenomegaly measuring 19.3 cm.    Family History:  Mother had breast cancer at the age of 43 yo. She underwent genetic testing and it was negative.  Father had colon cancer  at the age of 66 yo.    Oncology History    No history exists.        Past Medical History:   Diagnosis Date    Anxiety     Diabetes mellitus, type 2 2015    Dyslipidemia     Hypertension     Previously on  lisinopril    Obesity        Past Surgical History:   Procedure Laterality Date    LASIK Bilateral 2015    SPINE SURGERY  2011    2 disc removed; herniated disc. Lower back. Left leg crush injury       Family History   Problem Relation Name Age of Onset    Diabetes Mother      Breast cancer Mother          breast cancer; dx at early 40's    Diabetes Father      Colon cancer Neg Hx      Glaucoma Neg Hx      Cirrhosis Neg Hx         Social History     Socioeconomic History    Marital status:      Spouse name: Dickson   Occupational History    Occupation: Contractor    Tobacco Use    Smoking status: Former     Types: Cigars     Quit date:      Years since quittin.9     Passive exposure: Past    Smokeless tobacco: Never   Substance and Sexual Activity    Alcohol use: Yes     Comment: social; less then one a week    Drug use: No    Sexual activity: Yes     Partners: Female     Birth control/protection: Condom     Social Drivers of Health     Financial Resource Strain: Low Risk  (2023)    Overall Financial Resource Strain (CARDIA)     Difficulty of Paying Living Expenses: Not very hard   Food Insecurity: Food Insecurity Present (2023)    Hunger Vital Sign     Worried About Running Out of Food in the Last Year: Sometimes true     Ran Out of Food in the Last Year: Never true   Transportation Needs: No Transportation Needs (2023)    PRAPARE - Transportation     Lack of Transportation (Medical): No     Lack of Transportation (Non-Medical): No   Physical Activity: Insufficiently Active (2023)    Exercise Vital Sign     Days of Exercise per Week: 2 days     Minutes of Exercise per Session: 20 min   Stress: No Stress Concern Present (2023)    [employer] [place] [employer] - Occupational Stress Questionnaire     Feeling of Stress : Not at all   Housing Stability: Low Risk  (2023)    Housing Stability Vital Sign     Unable to Pay for Housing in the Last Year: No      "Number of Places Lived in the Last Year: 1     Unstable Housing in the Last Year: No       Review of patient's allergies indicates:   Allergen Reactions    Mounjaro [tirzepatide] Other (See Comments)     Patient developed with using Mounjaro, indigestion, belching, abdominal pain, diarrhea, upset stomach and nausea.  Was advised to discontinue the medication; please see e-mail addressing the issue..  Above GI complaints were addressed.  Pain was rated about a 6; he was advised to go to the emergency room should it worsen    Metformin      Discontinued with hematochezia; symptoms markedly improved but still persistent; remain off metformin    Rybelsus [semaglutide] Other (See Comments)     abdominal pain, nausea       Answers submitted by the patient for this visit:  Review of Systems Questionnaire (Submitted on 12/24/2024)  appetite change : No  unexpected weight change: No  mouth sores: No  visual disturbance: No  cough: Yes  shortness of breath: Yes  chest pain: Yes  abdominal pain: No  diarrhea: Yes  frequency: Yes  back pain: No  rash: No  headaches: No  adenopathy: No  nervous/ anxious: No         Objective:     Vitals:    12/24/24 0942   BP: 119/71   BP Location: Left arm   Patient Position: Sitting   Pulse: 87   Resp: 16   Temp: 97.4 °F (36.3 °C)   TempSrc: Temporal   SpO2: 97%   Weight: 105.2 kg (231 lb 14.8 oz)   Height: 6' 1" (1.854 m)        Physical Exam           Current Outpatient Medications on File Prior to Visit   Medication Sig    BASAGLAR KWIKPEN U-100 INSULIN glargine 100 units/mL SubQ pen Inject 26 Units into the skin once daily.    buPROPion (WELLBUTRIN XL) 300 MG 24 hr tablet Take 1 tablet (300 mg total) by mouth once daily.    DEXCOM G7 SENSOR Cookie 1 Device by Misc.(Non-Drug; Combo Route) route every 10 days.    dextroamphetamine-amphetamine (ADDERALL) 10 mg Tab Take 1 tablet (10 mg total) by mouth 2 (two) times a day.    empagliflozin (JARDIANCE) 25 mg tablet Take 1 tablet (25 mg total) by " "mouth once daily.    HUMALOG KWIKPEN INSULIN 100 unit/mL pen Use three times daily before meals, per sliding scale. Max TDD 30u    pen needle, diabetic (BD ULTRA-FINE IRA PEN NEEDLE) 32 gauge x 5/32" Ndle Uses 4 daily, on multiple daily insulin injections    semaglutide (OZEMPIC) 2 mg/dose (8 mg/3 mL) PnIj INJECT 2 MG INTO THE SKIN EVERY 7 DAYS    sertraline (ZOLOFT) 100 MG tablet TAKE 1 TABLET BY MOUTH EVERY DAY    atorvastatin (LIPITOR) 40 MG tablet Take 1 tablet (40 mg total) by mouth once daily.    mupirocin (BACTROBAN) 2 % ointment Apply topically. (Patient not taking: Reported on 12/24/2024)     No current facility-administered medications on file prior to visit.       CBC:  Lab Results   Component Value Date    WBC 6.28 12/06/2024    HGB 14.8 12/06/2024    HCT 45.3 12/06/2024    MCV 85 12/06/2024     12/06/2024         CMP:  Sodium   Date Value Ref Range Status   12/06/2024 139 136 - 145 mmol/L Final     Potassium   Date Value Ref Range Status   12/06/2024 4.5 3.5 - 5.1 mmol/L Final     Chloride   Date Value Ref Range Status   12/06/2024 104 95 - 110 mmol/L Final     CO2   Date Value Ref Range Status   12/06/2024 24 23 - 29 mmol/L Final     Glucose   Date Value Ref Range Status   12/06/2024 153 (H) 70 - 110 mg/dL Final     BUN   Date Value Ref Range Status   12/06/2024 12 6 - 20 mg/dL Final     Creatinine   Date Value Ref Range Status   12/06/2024 0.8 0.5 - 1.4 mg/dL Final     Calcium   Date Value Ref Range Status   12/06/2024 9.2 8.7 - 10.5 mg/dL Final     Total Protein   Date Value Ref Range Status   12/06/2024 7.3 6.0 - 8.4 g/dL Final     Albumin   Date Value Ref Range Status   12/06/2024 3.8 3.5 - 5.2 g/dL Final     Total Bilirubin   Date Value Ref Range Status   12/06/2024 0.7 0.1 - 1.0 mg/dL Final     Comment:     For infants and newborns, interpretation of results should be based  on gestational age, weight and in agreement with clinical  observations.    Premature Infant recommended reference " ranges:  Up to 24 hours.............<8.0 mg/dL  Up to 48 hours............<12.0 mg/dL  3-5 days..................<15.0 mg/dL  6-29 days.................<15.0 mg/dL       Alkaline Phosphatase   Date Value Ref Range Status   12/06/2024 66 40 - 150 U/L Final     AST   Date Value Ref Range Status   12/06/2024 38 10 - 40 U/L Final     ALT   Date Value Ref Range Status   12/06/2024 53 (H) 10 - 44 U/L Final     Anion Gap   Date Value Ref Range Status   12/06/2024 11 8 - 16 mmol/L Final     eGFR if    Date Value Ref Range Status   04/20/2022 >60.0 >60 mL/min/1.73 m^2 Final     eGFR if non    Date Value Ref Range Status   04/20/2022 >60.0 >60 mL/min/1.73 m^2 Final     Comment:     Calculation used to obtain the estimated glomerular filtration  rate (eGFR) is the CKD-EPI equation.          CT Abdomen Pelvis W Wo Contrast  Narrative: EXAMINATION:  CT ABDOMEN PELVIS W WO CONTRAST    CLINICAL HISTORY:  Abdominal pain, acute, nonlocalized;Fever, unspecified    TECHNIQUE:  Low dose axial images, sagittal and coronal reformations were obtained from the lung bases to the pubic symphysis before and following the IV administration of 80 mL of Omnipaque 350.  No oral contrast was given.    Automatic exposure control (AEC) was utilized for dose reduction.    Dose: 1572 mGycm    COMPARISON:  None    FINDINGS:  Lung bases appear clear.  Liver appears normal.  Spleen is enlarged measuring 19.3 cm.  The liver extends to the left abdominal wall.  Pancreas appears normal.  Biliary system appears normal.  The adrenals are not enlarged.  Kidneys appear normal.  The appendix appears normal.  Impression: Significant splenomegaly.    The liver extends across the abdomen with wide and is most likely enlarged.    Electronically signed by: Martell Crane MD  Date:    11/22/2024  Time:    12:53       ECOG SCORE                  Assessment/Plan:       T-LGL:  Large granular lymphocyte (LGL) leukemia is a clonal disease of  the large granular lymphocyte characterized by peripheral blood and marrow lymphocytic infiltration with LGLs, splenomegaly, and cytopenias, most commonly neutropenia.T cell LGL leukemia accounts for approximately 2 to 5 percent of the chronic lymphoproliferative disorders in [place].  A prominent feature of LGL leukemia is an association with other diseases in 40 percent of cases, particularly rheumatoid arthritis post infectious and other hematologic disorders.  For newly diagnosed patients with asymptomatic LGL leukemia, we suggest observation rather than immediate treatment.   The main goal of treatment for patients with LGL leukemia is the relief of symptoms, and especially a reduction in infections associated with neutropenia. Although intuitively sensible, it remains unclear whether achievement of hematologic improvement prolongs survival.  Treatment is indicated for relief of symptoms or life-threatening peripheral blood cytopenias:  ?Severe neutropenia (absolute neutrophil count <500/microL)  ?Moderate neutropenia (absolute neutrophil count <1000/microL) with recurrent infections  ?Symptomatic or transfusion dependent anemia  ?Severe thrombocytopenia (<50,000/microL), which is a very rare event  ?Associated autoimmune conditions (eg, rheumatoid arthritis) requiring therapy     Currently the patient is asymptomatic and he will continue to be followed clinically as well with repeat blood workup.    Repeat CBC CMP came back WNL.  He will continue on observation with repeat CBC CMP ESR LDH in 4 months.    Normocytic anemia.    Results on his repeat CBC  iron studies were favoring anemia of chronic disease.  vitamin B12 and folate came back normal.     Positive IgM CMV antibody.    PCR for CMV confirmed an acute infection.   No further workup or treatment is required for now.        Fatty liver and splenomegaly.    The patient was referred to be assessed by a hepatologist.           Med Onc Chart  Routing      Follow up with physician 4 months. virtual with repeat CBC CMP ESR LDH   Follow up with SARAH    Infusion scheduling note    Injection scheduling note    Labs    Imaging    Pharmacy appointment    Other referrals                   Plan was discussed with the patient at length, and he verbalized understanding. Roberto Carlos was given an opportunity to ask questions that were answered to his satisfaction, and he was advised to call in the interval if any problems or questions arise.  Signed:  Kaylee German MD   Hematology and Oncology  [facility] - HEMATOLOGY ONCOLOGY  [facility], [place]

## 2025-02-02 DIAGNOSIS — F33.42 RECURRENT MAJOR DEPRESSIVE DISORDER, IN FULL REMISSION: ICD-10-CM

## 2025-02-02 DIAGNOSIS — F41.1 GAD (GENERALIZED ANXIETY DISORDER): ICD-10-CM

## 2025-02-03 RX ORDER — BUPROPION HYDROCHLORIDE 300 MG/1
300 TABLET ORAL
Qty: 30 TABLET | Refills: 0 | Status: SHIPPED | OUTPATIENT
Start: 2025-02-03 | End: 2025-02-28

## 2025-02-14 DIAGNOSIS — E78.2 MIXED HYPERLIPIDEMIA: ICD-10-CM

## 2025-02-14 DIAGNOSIS — E11.65 TYPE 2 DIABETES MELLITUS WITH HYPERGLYCEMIA, WITHOUT LONG-TERM CURRENT USE OF INSULIN: ICD-10-CM

## 2025-02-14 RX ORDER — ATORVASTATIN CALCIUM 40 MG/1
40 TABLET, FILM COATED ORAL
Qty: 90 TABLET | Refills: 0 | Status: SHIPPED | OUTPATIENT
Start: 2025-02-14

## 2025-02-14 NOTE — TELEPHONE ENCOUNTER
Provider Staff:  Action required for this patient    Requires labs      Please see care gap opportunities below in Care Due Message.    Thanks!  [facility]     Appointments      Date Provider   Last Visit   11/22/2024 Kylah Zamudio MD   Next Visit   Visit date not found Kylah Zamudio MD     Refill Decision Note   Roberto Carlos Souza  is requesting a refill authorization.  Brief Assessment and Rationale for Refill:  Approve     Medication Therapy Plan:         Comments:     Note composed:11:00 AM 02/14/2025

## 2025-02-14 NOTE — TELEPHONE ENCOUNTER
Care Due:                  Date            Visit Type   Department     Provider  --------------------------------------------------------------------------------                                EP -                              PRIMARY      [facility] FAMILY  Last Visit: 11-      CARE ([facility])   MEDICINE       Kylah Zamudio  Next Visit: None Scheduled  None         None Found                                                            Last  Test          Frequency    Reason                     Performed    Due Date  --------------------------------------------------------------------------------    Lipid Panel.  12 months..  atorvastatin.............  03- 03-    [facility] Embedded Care Due Messages. Reference number: [id number].   2/14/2025 1:02:04 AM CST

## 2025-02-28 DIAGNOSIS — F33.42 RECURRENT MAJOR DEPRESSIVE DISORDER, IN FULL REMISSION: ICD-10-CM

## 2025-02-28 DIAGNOSIS — F41.1 GAD (GENERALIZED ANXIETY DISORDER): ICD-10-CM

## 2025-02-28 RX ORDER — BUPROPION HYDROCHLORIDE 300 MG/1
300 TABLET ORAL
Qty: 30 TABLET | Refills: 0 | Status: SHIPPED | OUTPATIENT
Start: 2025-02-28

## 2025-03-03 DIAGNOSIS — E11.29 TYPE 2 DIABETES MELLITUS WITH MICROALBUMINURIA, WITHOUT LONG-TERM CURRENT USE OF INSULIN: ICD-10-CM

## 2025-03-03 DIAGNOSIS — R80.9 TYPE 2 DIABETES MELLITUS WITH MICROALBUMINURIA, WITHOUT LONG-TERM CURRENT USE OF INSULIN: ICD-10-CM

## 2025-03-03 RX ORDER — EMPAGLIFLOZIN 25 MG/1
25 TABLET, FILM COATED ORAL
Qty: 30 TABLET | Refills: 6 | Status: SHIPPED | OUTPATIENT
Start: 2025-03-03

## 2025-03-22 DIAGNOSIS — F33.42 RECURRENT MAJOR DEPRESSIVE DISORDER, IN FULL REMISSION: ICD-10-CM

## 2025-03-22 DIAGNOSIS — F41.1 GAD (GENERALIZED ANXIETY DISORDER): ICD-10-CM

## 2025-03-24 NOTE — TELEPHONE ENCOUNTER
Last ordered: 3 weeks ago (2/28/2025) by Julia Campoverde NP     Last refill: 2/28/2025       Nov none   Lov 12/2/24

## 2025-03-25 RX ORDER — BUPROPION HYDROCHLORIDE 300 MG/1
300 TABLET ORAL
Qty: 90 TABLET | Refills: 1 | Status: SHIPPED | OUTPATIENT
Start: 2025-03-25

## 2025-04-09 DIAGNOSIS — E11.29 TYPE 2 DIABETES MELLITUS WITH MICROALBUMINURIA, WITHOUT LONG-TERM CURRENT USE OF INSULIN: ICD-10-CM

## 2025-04-09 DIAGNOSIS — R80.9 TYPE 2 DIABETES MELLITUS WITH MICROALBUMINURIA, WITHOUT LONG-TERM CURRENT USE OF INSULIN: ICD-10-CM

## 2025-04-09 RX ORDER — INSULIN GLARGINE 100 [IU]/ML
26 INJECTION, SOLUTION SUBCUTANEOUS DAILY
Qty: 15 ML | Refills: 6 | Status: SHIPPED | OUTPATIENT
Start: 2025-04-09 | End: 2026-04-09

## 2025-04-11 ENCOUNTER — PATIENT MESSAGE (OUTPATIENT)
Dept: ENDOCRINOLOGY | Facility: CLINIC | Age: 30
End: 2025-04-11

## 2025-04-16 ENCOUNTER — PATIENT MESSAGE (OUTPATIENT)
Dept: HEMATOLOGY/ONCOLOGY | Facility: CLINIC | Age: 30
End: 2025-04-16

## 2025-04-16 ENCOUNTER — TELEPHONE (OUTPATIENT)
Dept: HEMATOLOGY/ONCOLOGY | Facility: CLINIC | Age: 30
End: 2025-04-16

## 2025-04-16 NOTE — TELEPHONE ENCOUNTER
Called Mr. Azevedo due to him not getting his labs, LVM on his phone. I also called his other contact Dickson, she states I will have him call you later to reschedule.

## 2025-04-29 ENCOUNTER — TELEPHONE (OUTPATIENT)
Dept: HEMATOLOGY/ONCOLOGY | Facility: CLINIC | Age: 30
End: 2025-04-29

## 2025-04-29 NOTE — NURSING
Chart reviewed. Noted cancelled f/u visit with Dr. German on 4/17 and 4/24.     Called Mr. Souza to check in. R/s f/u visit with Dr. German to 5/26 - 1st available. Patient prefers virtual visit. Labs r/s to 5/23. Reviewed date/time/location of all visits. Dennis Harley v/u and thanked me for my call.

## 2025-05-02 DIAGNOSIS — F41.8 ANXIETY WITH DEPRESSION: ICD-10-CM

## 2025-05-02 DIAGNOSIS — F41.1 GAD (GENERALIZED ANXIETY DISORDER): ICD-10-CM

## 2025-05-02 DIAGNOSIS — F33.42 RECURRENT MAJOR DEPRESSIVE DISORDER, IN FULL REMISSION: ICD-10-CM

## 2025-05-02 RX ORDER — SERTRALINE HYDROCHLORIDE 100 MG/1
100 TABLET, FILM COATED ORAL
Qty: 90 TABLET | Refills: 1 | Status: SHIPPED | OUTPATIENT
Start: 2025-05-02

## 2025-05-19 ENCOUNTER — LAB VISIT (OUTPATIENT)
Dept: LAB | Facility: HOSPITAL | Age: 30
End: 2025-05-19
Attending: INTERNAL MEDICINE
Payer: COMMERCIAL

## 2025-05-19 DIAGNOSIS — D64.9 NORMOCYTIC ANEMIA: ICD-10-CM

## 2025-05-19 DIAGNOSIS — C91.Z0 T-CELL LARGE GRANULAR LYMPHOCYTIC LEUKEMIA: ICD-10-CM

## 2025-05-19 LAB
ABSOLUTE EOSINOPHIL (OHS): 0.17 K/UL
ABSOLUTE MONOCYTE (OHS): 0.61 K/UL (ref 0.3–1)
ABSOLUTE NEUTROPHIL COUNT (OHS): 4.52 K/UL (ref 1.8–7.7)
ALBUMIN SERPL BCP-MCNC: 4.1 G/DL (ref 3.5–5.2)
ALP SERPL-CCNC: 77 UNIT/L (ref 40–150)
ALT SERPL W/O P-5'-P-CCNC: 31 UNIT/L (ref 10–44)
ANION GAP (OHS): 14 MMOL/L (ref 8–16)
AST SERPL-CCNC: 18 UNIT/L (ref 11–45)
BASOPHILS # BLD AUTO: 0.04 K/UL
BASOPHILS NFR BLD AUTO: 0.5 %
BILIRUB SERPL-MCNC: 0.5 MG/DL (ref 0.1–1)
BUN SERPL-MCNC: 16 MG/DL (ref 6–20)
CALCIUM SERPL-MCNC: 9.9 MG/DL (ref 8.7–10.5)
CHLORIDE SERPL-SCNC: 103 MMOL/L (ref 95–110)
CO2 SERPL-SCNC: 22 MMOL/L (ref 23–29)
CREAT SERPL-MCNC: 0.8 MG/DL (ref 0.5–1.4)
ERYTHROCYTE [DISTWIDTH] IN BLOOD BY AUTOMATED COUNT: 12.5 % (ref 11.5–14.5)
ERYTHROCYTE [SEDIMENTATION RATE] IN BLOOD BY PHOTOMETRIC METHOD: 14 MM/HR
GFR SERPLBLD CREATININE-BSD FMLA CKD-EPI: >60 ML/MIN/1.73/M2
GLUCOSE SERPL-MCNC: 273 MG/DL (ref 70–110)
HCT VFR BLD AUTO: 49 % (ref 40–54)
HGB BLD-MCNC: 16.1 GM/DL (ref 14–18)
IMM GRANULOCYTES # BLD AUTO: 0.03 K/UL (ref 0–0.04)
IMM GRANULOCYTES NFR BLD AUTO: 0.3 % (ref 0–0.5)
LDH SERPL-CCNC: 163 U/L (ref 110–260)
LYMPHOCYTES # BLD AUTO: 3.28 K/UL (ref 1–4.8)
MCH RBC QN AUTO: 27.9 PG (ref 27–31)
MCHC RBC AUTO-ENTMCNC: 32.9 G/DL (ref 32–36)
MCV RBC AUTO: 85 FL (ref 82–98)
NUCLEATED RBC (/100WBC) (OHS): 0 /100 WBC
PLATELET # BLD AUTO: 250 K/UL (ref 150–450)
PMV BLD AUTO: 10.4 FL (ref 9.2–12.9)
POTASSIUM SERPL-SCNC: 4.2 MMOL/L (ref 3.5–5.1)
PROT SERPL-MCNC: 7.5 GM/DL (ref 6–8.4)
RBC # BLD AUTO: 5.77 M/UL (ref 4.6–6.2)
RELATIVE EOSINOPHIL (OHS): 2 %
RELATIVE LYMPHOCYTE (OHS): 37.9 % (ref 18–48)
RELATIVE MONOCYTE (OHS): 7.1 % (ref 4–15)
RELATIVE NEUTROPHIL (OHS): 52.2 % (ref 38–73)
SODIUM SERPL-SCNC: 139 MMOL/L (ref 136–145)
WBC # BLD AUTO: 8.65 K/UL (ref 3.9–12.7)

## 2025-05-19 PROCEDURE — 36415 COLL VENOUS BLD VENIPUNCTURE: CPT | Mod: PO

## 2025-05-19 PROCEDURE — 83615 LACTATE (LD) (LDH) ENZYME: CPT

## 2025-05-19 PROCEDURE — 80053 COMPREHEN METABOLIC PANEL: CPT

## 2025-05-19 PROCEDURE — 85025 COMPLETE CBC W/AUTO DIFF WBC: CPT

## 2025-05-19 PROCEDURE — 85652 RBC SED RATE AUTOMATED: CPT

## 2025-05-25 DIAGNOSIS — E78.2 MIXED HYPERLIPIDEMIA: ICD-10-CM

## 2025-05-25 DIAGNOSIS — E11.65 TYPE 2 DIABETES MELLITUS WITH HYPERGLYCEMIA, WITHOUT LONG-TERM CURRENT USE OF INSULIN: ICD-10-CM

## 2025-05-25 NOTE — TELEPHONE ENCOUNTER
Care Due:                  Date            Visit Type   Department     Provider  --------------------------------------------------------------------------------                                EP -                              PRIMARY      [facility] FAMILY  Last Visit: 11-      CARE ([facility])   MEDICINE       Kylah Zamudio  Next Visit: None Scheduled  None         None Found                                                            Last  Test          Frequency    Reason                     Performed    Due Date  --------------------------------------------------------------------------------    Lipid Panel.  12 months..  atorvastatin.............  03- 03-    [facility] Embedded Care Due Messages. Reference number: [id number].   5/25/2025 12:22:31 AM CDT

## 2025-05-25 NOTE — TELEPHONE ENCOUNTER
Refill Routing Note   Medication(s) are not appropriate for processing by [facility] for the following reason(s):        Required labs outdated    [facility] action(s):  Defer   Requires labs : Yes             Appointments  past 12m or future 3m with PCP    Date Provider   Last Visit   11/22/2024 Kylah Zamudio MD   Next Visit   Visit date not found Kylah Zamudio MD   ED visits in past 90 days: 0        Note composed:10:39 AM 05/25/2025

## 2025-05-26 ENCOUNTER — OFFICE VISIT (OUTPATIENT)
Dept: HEMATOLOGY/ONCOLOGY | Facility: CLINIC | Age: 30
End: 2025-05-26
Payer: COMMERCIAL

## 2025-05-26 ENCOUNTER — TELEPHONE (OUTPATIENT)
Dept: HEPATOLOGY | Facility: CLINIC | Age: 30
End: 2025-05-26
Payer: COMMERCIAL

## 2025-05-26 DIAGNOSIS — C91.Z0 T-CELL LARGE GRANULAR LYMPHOCYTIC LEUKEMIA: Primary | ICD-10-CM

## 2025-05-26 DIAGNOSIS — K76.0 FATTY LIVER: ICD-10-CM

## 2025-05-26 DIAGNOSIS — R16.1 SPLENOMEGALY: ICD-10-CM

## 2025-05-26 PROCEDURE — G2211 COMPLEX E/M VISIT ADD ON: HCPCS | Mod: 95,,, | Performed by: INTERNAL MEDICINE

## 2025-05-26 PROCEDURE — 98005 SYNCH AUDIO-VIDEO EST LOW 20: CPT | Mod: 95,,, | Performed by: INTERNAL MEDICINE

## 2025-05-26 RX ORDER — ATORVASTATIN CALCIUM 40 MG/1
40 TABLET, FILM COATED ORAL
Qty: 90 TABLET | Refills: 0 | Status: SHIPPED | OUTPATIENT
Start: 2025-05-26

## 2025-05-26 NOTE — PROGRESS NOTES
FOLLOW UP TELEMEDICINE VISIT    Subjective:      Patient ID: Roberto Carlos Souza is a 29 y.o. male.  MRN: [id number]  : 1995    An audio and visual care visit was performed with the patient because of the COVID-19 pandemic recommendations for social distancing.    TELEMEDICINE  The patient location is: [place]  The patient location is: home  The chief complaint leading to consultation is: T-cell large Lymphocytic leukemia  Visit type: Virtual visit with synchronous audio and video    Total time spent with patient: 5 minutes  21 minutes of total time spent on the encounter, which includes face to face time and non-face to face time preparing to see the patient (eg, review of tests), obtaining and/or reviewing separately obtained history, documenting clinical information in the electronic or other health record, independently interpreting results (not separately reported) and communicating results to the patient/family/caregiver, or care coordination (not separately reported).    Each patient to whom he or she provides medical services by telemedicine is:  (1) informed of the relationship between the physician and patient and the respective role of any other health care provider with respect to management of the patient; and (2) notified that he or she may decline to receive medical services by telemedicine and may withdraw from such care at any time.    History of Present Illness:   HPI Roberto Carlos Souza is a 29 y.o. male presents for a follow-up  to assess his T-cell large granular lymphocytic leukemia.  He is reporting to this visit by himself.     The patient denies CP, cough, SOB, abdominal pain, nausea, vomiting, constipation.  The patient denies fever, chills, night sweats, weight loss, new lumps or bumps, easy bruising or bleeding.      Oncology History:  Oncology History    No history exists.      Past medical, surgical, family, and social history were reviewed today and there are no changes of note unless  mentioned in HPI.   MEDS and ALLERGIES were reviewed with patient and meds reconciled.     History:  Past Medical History:   Diagnosis Date    Anxiety     Diabetes mellitus, type 2 2015    Dyslipidemia     Hypertension     Previously on lisinopril    Obesity       Past Surgical History:   Procedure Laterality Date    LASIK Bilateral 2015    SPINE SURGERY      2 disc removed; herniated disc. Lower back. Left leg crush injury     Family History   Problem Relation Name Age of Onset    Diabetes Mother      Breast cancer Mother          breast cancer; dx at early 40's    Diabetes Father      Colon cancer Neg Hx      Glaucoma Neg Hx      Cirrhosis Neg Hx        Social History     Tobacco Use    Smoking status: Former     Types: Cigars     Quit date:      Years since quittin.4     Passive exposure: Past    Smokeless tobacco: Never   Substance and Sexual Activity    Alcohol use: Yes     Comment: social; less then one a week    Drug use: No    Sexual activity: Yes     Partners: Female     Birth control/protection: Condom        ROS:  Answers submitted by the patient for this visit:  Review of Systems Questionnaire (Submitted on 2025)  appetite change : No  unexpected weight change: No  mouth sores: No  visual disturbance: No  cough: No  shortness of breath: No  chest pain: No  abdominal pain: No  diarrhea: No  frequency: No  back pain: No  rash: No  headaches: No  adenopathy: No  nervous/ anxious: No      Objective:   There were no vitals filed for this visit.  Wt Readings from Last 10 Encounters:   24 105.2 kg (231 lb 14.8 oz)   24 105.3 kg (232 lb 2.3 oz)   24 108.4 kg (239 lb 1.4 oz)   24 107.3 kg (236 lb 8.9 oz)   10/26/24 107.7 kg (237 lb 7 oz)   24 113.4 kg (250 lb)   23 111.1 kg (245 lb 0.7 oz)   23 115.4 kg (254 lb 6.6 oz)   22 113.1 kg (249 lb 5.4 oz)   22 113.4 kg (250 lb)       Physical Examination:   Constitutional: he is alert, pleasant, and  does not appear to be in any physical distress   HENT: Mouth/Throat:  Tongue is midline without evidence of glossitis  Eyes: No obvious jaundice or conjunctivitis.  EOM are normal.   Pulmonary/Chest: No stridor noted. No excess chest muscle movement.  Neurological: he is alert and oriented to person, place, and time. No cranial nerve deficit.  Skin:  No rash noted. No erythema.   Psychiatric: he has a normal mood and affect. Speech and memory normal.     Diagnostic Tests:  Significant Imaging:  I have reviewed and interpreted all pertinent imaging results/findings.    Laboratory Data:  Lab Results   Component Value Date    WBC 8.65 05/19/2025    HGB 16.1 05/19/2025    HCT 49.0 05/19/2025     05/19/2025    CHOL 151 03/26/2024    TRIG 123 03/26/2024    HDL 30 (L) 03/26/2024    ALT 31 05/19/2025    AST 18 05/19/2025     05/19/2025    K 4.2 05/19/2025     05/19/2025    CREATININE 0.8 05/19/2025    BUN 16 05/19/2025    CO2 22 (L) 05/19/2025    TSH 1.070 11/22/2024    HGBA1C 7.8 (H) 11/18/2024        Labs:   Lab Results   Component Value Date    WBC 8.65 05/19/2025    RBC 5.77 05/19/2025    HGB 16.1 05/19/2025    HCT 49.0 05/19/2025    MCV 85 05/19/2025     05/19/2025     (H) 05/19/2025     05/19/2025    K 4.2 05/19/2025    BUN 16 05/19/2025    CREATININE 0.8 05/19/2025    AST 18 05/19/2025    ALT 31 05/19/2025    BILITOT 0.5 05/19/2025         Assessment/Plan:     ECOG SCORE    0 - Fully active-able to carry on all pre-disease performance without restriction       T-LGL:  Large granular lymphocyte (LGL) leukemia is a clonal disease of the large granular lymphocyte characterized by peripheral blood and marrow lymphocytic infiltration with LGLs, splenomegaly, and cytopenias, most commonly neutropenia.T cell LGL leukemia accounts for approximately 2 to 5 percent of the chronic lymphoproliferative disorders in [place].  A prominent feature of LGL leukemia is an association with  other diseases in 40 percent of cases, particularly rheumatoid arthritis post infectious and other hematologic disorders.  For newly diagnosed patients with asymptomatic LGL leukemia, we suggest observation rather than immediate treatment.   The main goal of treatment for patients with LGL leukemia is the relief of symptoms, and especially a reduction in infections associated with neutropenia. Although intuitively sensible, it remains unclear whether achievement of hematologic improvement prolongs survival.  Treatment is indicated for relief of symptoms or life-threatening peripheral blood cytopenias:  ?Severe neutropenia (absolute neutrophil count <500/microL)  ?Moderate neutropenia (absolute neutrophil count <1000/microL) with recurrent infections  ?Symptomatic or transfusion dependent anemia  ?Severe thrombocytopenia (<50,000/microL), which is a very rare event  ?Associated autoimmune conditions (eg, rheumatoid arthritis) requiring therapy      -Labs on 5/19/2025 showed a normal CBC CMP ESR LDH   Currently the patient is asymptomatic and he will continue to be followed clinically as well with repeat blood workup.    Repeat CBC CMP came back WNL.  He will continue on observation with repeat CBC CMP ESR LDH in 6 months.     Normocytic anemia.    Resolved.     Positive IgM CMV antibody.    PCR for CMV confirmed an acute infection.   No further workup or treatment is required for now.        Fatty liver and splenomegaly.    The patient was referred to be assessed by a hepatologist.              Med Onc Chart Routing      Follow up with physician 6 months. with repeat CBC CMP ESR LDH, Referral to hepatology ( virtual visit)   Follow up with SARAH    Infusion scheduling note    Injection scheduling note    Labs    Imaging    Pharmacy appointment    Other referrals                 Plan was discussed with the patient at length, and he verbalized understanding. Roberto Carlos was given an opportunity to ask questions that were  answered to his satisfaction, and he was advised to call in the interval if any problems or questions arise.  Discussed COVID-19 and social distancing in great detail, avoid all non-essential visits out of the home if possible and avoid sick contacts.     Electronically signed by Kaylee German MD      {

## 2025-05-26 NOTE — TELEPHONE ENCOUNTER
----- Message from NAYELY Reynoso sent at 5/26/2025 11:47 AM CDT -----  Regarding: Referral  Please assist with getting patient scheduled with Hepatology. Dr German has placed a referral. Thanks

## 2025-05-26 NOTE — TELEPHONE ENCOUNTER
Called the patient to schedule a hepatology consult from referral.  Scheduled to the next available appt 6/5/2025 with Ms. Thu Galvin at [facility]. Patient confirmed and agreed with the schedule.  Reminder letter mailed.

## 2025-06-04 ENCOUNTER — DOCUMENTATION ONLY (OUTPATIENT)
Dept: HEMATOLOGY/ONCOLOGY | Facility: CLINIC | Age: 30
End: 2025-06-04
Payer: COMMERCIAL

## 2025-08-16 ENCOUNTER — PATIENT MESSAGE (OUTPATIENT)
Dept: ADMINISTRATIVE | Facility: HOSPITAL | Age: 30
End: 2025-08-16
Payer: COMMERCIAL